# Patient Record
Sex: FEMALE | Race: WHITE | Employment: UNEMPLOYED | URBAN - NONMETROPOLITAN AREA
[De-identification: names, ages, dates, MRNs, and addresses within clinical notes are randomized per-mention and may not be internally consistent; named-entity substitution may affect disease eponyms.]

---

## 2017-02-06 ENCOUNTER — HOSPITAL ENCOUNTER (EMERGENCY)
Age: 27
Discharge: HOME | End: 2017-02-06
Attending: FAMILY MEDICINE
Payer: MEDICAID

## 2017-02-06 VITALS
HEIGHT: 67 IN | DIASTOLIC BLOOD PRESSURE: 99 MMHG | SYSTOLIC BLOOD PRESSURE: 142 MMHG | OXYGEN SATURATION: 97 % | BODY MASS INDEX: 29.82 KG/M2 | WEIGHT: 190 LBS | RESPIRATION RATE: 20 BRPM | TEMPERATURE: 98.1 F | HEART RATE: 100 BPM

## 2017-02-06 DIAGNOSIS — R44.0 AUDITORY HALLUCINATIONS: Primary | ICD-10-CM

## 2017-02-06 DIAGNOSIS — F31.9 BIPOLAR 1 DISORDER (HCC): ICD-10-CM

## 2017-02-06 LAB
ALBUMIN SERPL-MCNC: 4.8 G/DL (ref 3.5–5.2)
ALP BLD-CCNC: 73 U/L (ref 35–104)
ALT SERPL-CCNC: 36 U/L (ref 5–33)
AMPHETAMINE SCREEN, URINE: NEGATIVE
ANION GAP SERPL CALCULATED.3IONS-SCNC: 14 MMOL/L (ref 7–19)
AST SERPL-CCNC: 28 U/L (ref 5–32)
BACTERIA: ABNORMAL /HPF
BARBITURATE SCREEN URINE: NEGATIVE
BASOPHILS ABSOLUTE: 0.1 K/UL (ref 0–0.2)
BASOPHILS RELATIVE PERCENT: 0.3 % (ref 0–1)
BENZODIAZEPINE SCREEN, URINE: NEGATIVE
BILIRUB SERPL-MCNC: <0.2 MG/DL (ref 0.2–1.2)
BILIRUBIN URINE: NEGATIVE
BLOOD, URINE: NEGATIVE
BUN BLDV-MCNC: 8 MG/DL (ref 6–20)
CALCIUM SERPL-MCNC: 8.9 MG/DL (ref 8.6–10)
CANNABINOID SCREEN URINE: NEGATIVE
CHLORIDE BLD-SCNC: 99 MMOL/L (ref 98–111)
CLARITY: ABNORMAL
CO2: 26 MMOL/L (ref 22–29)
COCAINE METABOLITE SCREEN URINE: NEGATIVE
COLOR: YELLOW
CREAT SERPL-MCNC: 0.8 MG/DL (ref 0.5–0.9)
EOSINOPHILS ABSOLUTE: 0.2 K/UL (ref 0–0.6)
EOSINOPHILS RELATIVE PERCENT: 1.6 % (ref 0–5)
EPITHELIAL CELLS, UA: ABNORMAL /HPF
ETHANOL: <10 MG/DL (ref 0–0.08)
GFR NON-AFRICAN AMERICAN: >60
GLOBULIN: 2.7 G/DL
GLUCOSE BLD-MCNC: 106 MG/DL (ref 74–109)
GLUCOSE URINE: NEGATIVE MG/DL
HCG(URINE) PREGNANCY TEST: NEGATIVE
HCT VFR BLD CALC: 43.2 % (ref 37–47)
HEMOGLOBIN: 14.8 G/DL (ref 12–16)
KETONES, URINE: NEGATIVE MG/DL
LEUKOCYTE ESTERASE, URINE: ABNORMAL
LYMPHOCYTES ABSOLUTE: 5.4 K/UL (ref 1.1–4.5)
LYMPHOCYTES RELATIVE PERCENT: 36.5 % (ref 20–40)
Lab: NORMAL
MCH RBC QN AUTO: 31.6 PG (ref 27–31)
MCHC RBC AUTO-ENTMCNC: 34.3 G/DL (ref 33–37)
MCV RBC AUTO: 92.3 FL (ref 81–99)
MONOCYTES ABSOLUTE: 0.7 K/UL (ref 0–0.9)
MONOCYTES RELATIVE PERCENT: 4.6 % (ref 0–10)
NEUTROPHILS ABSOLUTE: 8.5 K/UL (ref 1.5–7.5)
NEUTROPHILS RELATIVE PERCENT: 57 % (ref 50–65)
NITRITE, URINE: NEGATIVE
OPIATE SCREEN URINE: NEGATIVE
PDW BLD-RTO: 12.9 % (ref 11.5–14.5)
PH UA: 6.5
PLATELET # BLD: 253 K/UL (ref 130–400)
PMV BLD AUTO: 10.3 FL (ref 7.4–10.4)
POTASSIUM SERPL-SCNC: 3.8 MMOL/L (ref 3.5–5)
PROTEIN UA: NEGATIVE MG/DL
RBC # BLD: 4.68 M/UL (ref 4.2–5.4)
RBC UA: ABNORMAL /HPF (ref 0–2)
SODIUM BLD-SCNC: 139 MMOL/L (ref 136–145)
SPECIFIC GRAVITY UA: 1.01
TOTAL PROTEIN: 7.5 G/DL (ref 6.6–8.7)
UROBILINOGEN, URINE: 0.2 E.U./DL
WBC # BLD: 14.9 K/UL (ref 4.8–10.8)
WBC UA: ABNORMAL /HPF (ref 0–5)

## 2017-02-06 PROCEDURE — 85025 COMPLETE CBC W/AUTO DIFF WBC: CPT

## 2017-02-06 PROCEDURE — 87086 URINE CULTURE/COLONY COUNT: CPT

## 2017-02-06 PROCEDURE — 81001 URINALYSIS AUTO W/SCOPE: CPT

## 2017-02-06 PROCEDURE — 80307 DRUG TEST PRSMV CHEM ANLYZR: CPT

## 2017-02-06 PROCEDURE — G0480 DRUG TEST DEF 1-7 CLASSES: HCPCS

## 2017-02-06 PROCEDURE — 99285 EMERGENCY DEPT VISIT HI MDM: CPT

## 2017-02-06 PROCEDURE — 81025 URINE PREGNANCY TEST: CPT

## 2017-02-06 PROCEDURE — 36415 COLL VENOUS BLD VENIPUNCTURE: CPT

## 2017-02-06 PROCEDURE — 99283 EMERGENCY DEPT VISIT LOW MDM: CPT | Performed by: FAMILY MEDICINE

## 2017-02-06 PROCEDURE — 80053 COMPREHEN METABOLIC PANEL: CPT

## 2017-02-06 RX ORDER — QUETIAPINE FUMARATE 25 MG/1
50 TABLET, FILM COATED ORAL NIGHTLY
Qty: 7 TABLET | Refills: 0 | Status: ON HOLD | OUTPATIENT
Start: 2017-02-06 | End: 2017-02-16 | Stop reason: HOSPADM

## 2017-02-06 ASSESSMENT — ENCOUNTER SYMPTOMS
CONSTIPATION: 0
WHEEZING: 0
ABDOMINAL PAIN: 0
DIARRHEA: 0
COUGH: 0
SHORTNESS OF BREATH: 0
APNEA: 0
BACK PAIN: 0
ABDOMINAL DISTENTION: 0
CHEST TIGHTNESS: 0
SORE THROAT: 0
TROUBLE SWALLOWING: 0
VOMITING: 0

## 2017-02-08 LAB — URINE CULTURE, ROUTINE: NORMAL

## 2017-02-14 ENCOUNTER — HOSPITAL ENCOUNTER (INPATIENT)
Age: 27
LOS: 3 days | Discharge: HOME | DRG: 750 | End: 2017-02-17
Attending: EMERGENCY MEDICINE | Admitting: PSYCHIATRY & NEUROLOGY
Payer: MEDICAID

## 2017-02-14 DIAGNOSIS — F20.0 PARANOID SCHIZOPHRENIA (HCC): Primary | ICD-10-CM

## 2017-02-14 LAB
ALBUMIN SERPL-MCNC: 4.5 G/DL (ref 3.5–5.2)
ALP BLD-CCNC: 71 U/L (ref 35–104)
ALT SERPL-CCNC: 86 U/L (ref 5–33)
AMPHETAMINE SCREEN, URINE: NEGATIVE
ANION GAP SERPL CALCULATED.3IONS-SCNC: 11 MMOL/L (ref 7–19)
AST SERPL-CCNC: 55 U/L (ref 5–32)
BARBITURATE SCREEN URINE: NEGATIVE
BASOPHILS ABSOLUTE: 0 K/UL (ref 0–0.2)
BASOPHILS RELATIVE PERCENT: 0.3 % (ref 0–1)
BENZODIAZEPINE SCREEN, URINE: NEGATIVE
BILIRUB SERPL-MCNC: 0.3 MG/DL (ref 0.2–1.2)
BILIRUBIN URINE: NEGATIVE
BLOOD, URINE: NEGATIVE
BUN BLDV-MCNC: 7 MG/DL (ref 6–20)
CALCIUM SERPL-MCNC: 8.8 MG/DL (ref 8.6–10)
CANNABINOID SCREEN URINE: NEGATIVE
CHLORIDE BLD-SCNC: 103 MMOL/L (ref 98–111)
CLARITY: ABNORMAL
CO2: 28 MMOL/L (ref 22–29)
COCAINE METABOLITE SCREEN URINE: NEGATIVE
COLOR: YELLOW
CREAT SERPL-MCNC: 0.7 MG/DL (ref 0.5–0.9)
EOSINOPHILS ABSOLUTE: 0.2 K/UL (ref 0–0.6)
EOSINOPHILS RELATIVE PERCENT: 2.1 % (ref 0–5)
ETHANOL: <10 MG/DL (ref 0–0.08)
GFR NON-AFRICAN AMERICAN: >60
GLOBULIN: 2.7 G/DL
GLUCOSE BLD-MCNC: 84 MG/DL (ref 74–109)
GLUCOSE URINE: NEGATIVE MG/DL
HCG(URINE) PREGNANCY TEST: NEGATIVE
HCT VFR BLD CALC: 42.2 % (ref 37–47)
HEMOGLOBIN: 14.4 G/DL (ref 12–16)
KETONES, URINE: NEGATIVE MG/DL
LEUKOCYTE ESTERASE, URINE: NEGATIVE
LYMPHOCYTES ABSOLUTE: 4 K/UL (ref 1.1–4.5)
LYMPHOCYTES RELATIVE PERCENT: 35.3 % (ref 20–40)
Lab: NORMAL
MCH RBC QN AUTO: 31.6 PG (ref 27–31)
MCHC RBC AUTO-ENTMCNC: 34.1 G/DL (ref 33–37)
MCV RBC AUTO: 92.5 FL (ref 81–99)
MONOCYTES ABSOLUTE: 0.7 K/UL (ref 0–0.9)
MONOCYTES RELATIVE PERCENT: 6.1 % (ref 0–10)
NEUTROPHILS ABSOLUTE: 6.4 K/UL (ref 1.5–7.5)
NEUTROPHILS RELATIVE PERCENT: 56.2 % (ref 50–65)
NITRITE, URINE: NEGATIVE
OPIATE SCREEN URINE: NEGATIVE
PDW BLD-RTO: 12.7 % (ref 11.5–14.5)
PH UA: 7
PLATELET # BLD: 228 K/UL (ref 130–400)
PMV BLD AUTO: 10.1 FL (ref 7.4–10.4)
POTASSIUM SERPL-SCNC: 4.1 MMOL/L (ref 3.5–5)
PROTEIN UA: NEGATIVE MG/DL
RBC # BLD: 4.56 M/UL (ref 4.2–5.4)
SODIUM BLD-SCNC: 142 MMOL/L (ref 136–145)
SPECIFIC GRAVITY UA: 1.01
TOTAL PROTEIN: 7.2 G/DL (ref 6.6–8.7)
UROBILINOGEN, URINE: 0.2 E.U./DL
WBC # BLD: 11.4 K/UL (ref 4.8–10.8)

## 2017-02-14 PROCEDURE — 81003 URINALYSIS AUTO W/O SCOPE: CPT

## 2017-02-14 PROCEDURE — 99283 EMERGENCY DEPT VISIT LOW MDM: CPT | Performed by: EMERGENCY MEDICINE

## 2017-02-14 PROCEDURE — 6370000000 HC RX 637 (ALT 250 FOR IP): Performed by: PSYCHIATRY & NEUROLOGY

## 2017-02-14 PROCEDURE — 36415 COLL VENOUS BLD VENIPUNCTURE: CPT

## 2017-02-14 PROCEDURE — 80307 DRUG TEST PRSMV CHEM ANLYZR: CPT

## 2017-02-14 PROCEDURE — 81025 URINE PREGNANCY TEST: CPT

## 2017-02-14 PROCEDURE — 99285 EMERGENCY DEPT VISIT HI MDM: CPT

## 2017-02-14 PROCEDURE — 1240000000 HC EMOTIONAL WELLNESS R&B

## 2017-02-14 PROCEDURE — G0480 DRUG TEST DEF 1-7 CLASSES: HCPCS

## 2017-02-14 PROCEDURE — 80053 COMPREHEN METABOLIC PANEL: CPT

## 2017-02-14 PROCEDURE — 85025 COMPLETE CBC W/AUTO DIFF WBC: CPT

## 2017-02-14 RX ORDER — QUETIAPINE FUMARATE 25 MG/1
25 TABLET, FILM COATED ORAL NIGHTLY
Status: DISCONTINUED | OUTPATIENT
Start: 2017-02-14 | End: 2017-02-15

## 2017-02-14 RX ORDER — NICOTINE 21 MG/24HR
1 PATCH, TRANSDERMAL 24 HOURS TRANSDERMAL DAILY
Status: DISCONTINUED | OUTPATIENT
Start: 2017-02-14 | End: 2017-02-16

## 2017-02-14 RX ORDER — QUETIAPINE FUMARATE 25 MG/1
25 TABLET, FILM COATED ORAL ONCE
Status: COMPLETED | OUTPATIENT
Start: 2017-02-14 | End: 2017-02-14

## 2017-02-14 RX ORDER — ACETAMINOPHEN 325 MG/1
650 TABLET ORAL EVERY 4 HOURS PRN
Status: DISCONTINUED | OUTPATIENT
Start: 2017-02-14 | End: 2017-02-17 | Stop reason: HOSPADM

## 2017-02-14 RX ADMIN — QUETIAPINE FUMARATE 25 MG: 25 TABLET, FILM COATED ORAL at 21:31

## 2017-02-14 RX ADMIN — QUETIAPINE FUMARATE 25 MG: 25 TABLET, FILM COATED ORAL at 21:01

## 2017-02-14 ASSESSMENT — LIFESTYLE VARIABLES: HISTORY_ALCOHOL_USE: NO

## 2017-02-15 ENCOUNTER — APPOINTMENT (OUTPATIENT)
Dept: CT IMAGING | Age: 27
DRG: 750 | End: 2017-02-15
Payer: MEDICAID

## 2017-02-15 PROBLEM — F25.9 SCHIZOAFFECTIVE DISORDER (HCC): Status: ACTIVE | Noted: 2017-02-15

## 2017-02-15 PROCEDURE — 6370000000 HC RX 637 (ALT 250 FOR IP): Performed by: PSYCHIATRY & NEUROLOGY

## 2017-02-15 PROCEDURE — 6370000000 HC RX 637 (ALT 250 FOR IP): Performed by: NURSE PRACTITIONER

## 2017-02-15 PROCEDURE — 90792 PSYCH DIAG EVAL W/MED SRVCS: CPT | Performed by: NURSE PRACTITIONER

## 2017-02-15 PROCEDURE — 1240000000 HC EMOTIONAL WELLNESS R&B

## 2017-02-15 PROCEDURE — 70450 CT HEAD/BRAIN W/O DYE: CPT

## 2017-02-15 RX ORDER — DICYCLOMINE HYDROCHLORIDE 10 MG/1
10 CAPSULE ORAL
Status: DISCONTINUED | OUTPATIENT
Start: 2017-02-15 | End: 2017-02-15

## 2017-02-15 RX ORDER — METHADONE HYDROCHLORIDE 10 MG/ML
100 CONCENTRATE ORAL DAILY
Status: DISCONTINUED | OUTPATIENT
Start: 2017-02-17 | End: 2017-02-17 | Stop reason: HOSPADM

## 2017-02-15 RX ORDER — CLONIDINE HYDROCHLORIDE 0.1 MG/1
0.1 TABLET ORAL 2 TIMES DAILY
Status: DISCONTINUED | OUTPATIENT
Start: 2017-02-15 | End: 2017-02-15

## 2017-02-15 RX ORDER — LOPERAMIDE HYDROCHLORIDE 2 MG/1
2 CAPSULE ORAL 4 TIMES DAILY PRN
Status: DISCONTINUED | OUTPATIENT
Start: 2017-02-15 | End: 2017-02-17 | Stop reason: HOSPADM

## 2017-02-15 RX ORDER — ONDANSETRON 4 MG/1
4 TABLET, FILM COATED ORAL ONCE
Status: DISCONTINUED | OUTPATIENT
Start: 2017-02-15 | End: 2017-02-15

## 2017-02-15 RX ORDER — RISPERIDONE 1 MG/1
1 TABLET, FILM COATED ORAL 2 TIMES DAILY
Status: DISCONTINUED | OUTPATIENT
Start: 2017-02-15 | End: 2017-02-17 | Stop reason: HOSPADM

## 2017-02-15 RX ORDER — METHADONE HYDROCHLORIDE 10 MG/ML
100 CONCENTRATE ORAL DAILY
Status: COMPLETED | OUTPATIENT
Start: 2017-02-15 | End: 2017-02-16

## 2017-02-15 RX ORDER — RISPERIDONE 1 MG/1
2 TABLET, FILM COATED ORAL ONCE
Status: COMPLETED | OUTPATIENT
Start: 2017-02-15 | End: 2017-02-15

## 2017-02-15 RX ADMIN — ACETAMINOPHEN 650 MG: 325 TABLET, FILM COATED ORAL at 21:38

## 2017-02-15 RX ADMIN — METHADONE HYDROCHLORIDE 100 MG: 10 CONCENTRATE ORAL at 11:50

## 2017-02-15 RX ADMIN — RISPERIDONE 1 MG: 1 TABLET, FILM COATED ORAL at 21:21

## 2017-02-15 RX ADMIN — ACETAMINOPHEN 650 MG: 325 TABLET, FILM COATED ORAL at 16:46

## 2017-02-15 RX ADMIN — RISPERIDONE 2 MG: 1 TABLET, FILM COATED ORAL at 11:54

## 2017-02-15 ASSESSMENT — PAIN DESCRIPTION - ORIENTATION: ORIENTATION: UPPER;ANTERIOR;MID

## 2017-02-15 ASSESSMENT — PAIN DESCRIPTION - FREQUENCY: FREQUENCY: INTERMITTENT

## 2017-02-15 ASSESSMENT — SLEEP AND FATIGUE QUESTIONNAIRES
AVERAGE NUMBER OF SLEEP HOURS: 4
DIFFICULTY FALLING ASLEEP: NO
SLEEP PATTERN: DISTURBED/INTERRUPTED SLEEP
DIFFICULTY ARISING: NO
RESTFUL SLEEP: NO
DO YOU HAVE DIFFICULTY SLEEPING: YES
DIFFICULTY STAYING ASLEEP: YES
DO YOU USE A SLEEP AID: YES

## 2017-02-15 ASSESSMENT — PAIN SCALES - GENERAL
PAINLEVEL_OUTOF10: 7
PAINLEVEL_OUTOF10: 0
PAINLEVEL_OUTOF10: 1
PAINLEVEL_OUTOF10: 5

## 2017-02-15 ASSESSMENT — PAIN DESCRIPTION - LOCATION: LOCATION: HEAD

## 2017-02-15 ASSESSMENT — PAIN DESCRIPTION - PAIN TYPE: TYPE: ACUTE PAIN

## 2017-02-15 ASSESSMENT — PAIN DESCRIPTION - ONSET: ONSET: GRADUAL

## 2017-02-15 ASSESSMENT — PAIN DESCRIPTION - DESCRIPTORS: DESCRIPTORS: ACHING;PRESSURE

## 2017-02-15 ASSESSMENT — PAIN DESCRIPTION - PROGRESSION: CLINICAL_PROGRESSION: GRADUALLY IMPROVING

## 2017-02-16 LAB
ALBUMIN SERPL-MCNC: 4.1 G/DL (ref 3.5–5.2)
ALP BLD-CCNC: 64 U/L (ref 35–104)
ALT SERPL-CCNC: 76 U/L (ref 5–33)
ANION GAP SERPL CALCULATED.3IONS-SCNC: 13 MMOL/L (ref 7–19)
AST SERPL-CCNC: 43 U/L (ref 5–32)
BILIRUB SERPL-MCNC: 0.4 MG/DL (ref 0.2–1.2)
BUN BLDV-MCNC: 8 MG/DL (ref 6–20)
CALCIUM SERPL-MCNC: 9 MG/DL (ref 8.6–10)
CHLORIDE BLD-SCNC: 103 MMOL/L (ref 98–111)
CO2: 25 MMOL/L (ref 22–29)
CREAT SERPL-MCNC: 0.6 MG/DL (ref 0.5–0.9)
GFR NON-AFRICAN AMERICAN: >60
GLOBULIN: 2.6 G/DL
GLUCOSE BLD-MCNC: 82 MG/DL (ref 74–109)
POTASSIUM SERPL-SCNC: 4.3 MMOL/L (ref 3.5–5)
SODIUM BLD-SCNC: 141 MMOL/L (ref 136–145)
TOTAL PROTEIN: 6.7 G/DL (ref 6.6–8.7)
TSH SERPL DL<=0.05 MIU/L-ACNC: 4.68 UIU/ML (ref 0.27–4.2)
VITAMIN B-12: 726 PG/ML (ref 211–946)
VITAMIN D 25-HYDROXY: 8.5 NG/ML

## 2017-02-16 PROCEDURE — 86803 HEPATITIS C AB TEST: CPT

## 2017-02-16 PROCEDURE — 80053 COMPREHEN METABOLIC PANEL: CPT

## 2017-02-16 PROCEDURE — 82306 VITAMIN D 25 HYDROXY: CPT

## 2017-02-16 PROCEDURE — 99231 SBSQ HOSP IP/OBS SF/LOW 25: CPT | Performed by: NURSE PRACTITIONER

## 2017-02-16 PROCEDURE — 84443 ASSAY THYROID STIM HORMONE: CPT

## 2017-02-16 PROCEDURE — 6370000000 HC RX 637 (ALT 250 FOR IP): Performed by: NURSE PRACTITIONER

## 2017-02-16 PROCEDURE — 6370000000 HC RX 637 (ALT 250 FOR IP): Performed by: PSYCHIATRY & NEUROLOGY

## 2017-02-16 PROCEDURE — 1240000000 HC EMOTIONAL WELLNESS R&B

## 2017-02-16 PROCEDURE — 36415 COLL VENOUS BLD VENIPUNCTURE: CPT

## 2017-02-16 PROCEDURE — 82607 VITAMIN B-12: CPT

## 2017-02-16 RX ORDER — ERGOCALCIFEROL (VITAMIN D2) 1250 MCG
50000 CAPSULE ORAL WEEKLY
Qty: 5 CAPSULE | Refills: 0 | Status: CANCELLED | OUTPATIENT
Start: 2017-02-16

## 2017-02-16 RX ORDER — ERGOCALCIFEROL 1.25 MG/1
50000 CAPSULE ORAL WEEKLY
Status: DISCONTINUED | OUTPATIENT
Start: 2017-02-16 | End: 2017-02-17 | Stop reason: HOSPADM

## 2017-02-16 RX ORDER — RISPERIDONE 1 MG/1
1 TABLET, FILM COATED ORAL 2 TIMES DAILY
Qty: 60 TABLET | Refills: 1 | Status: SHIPPED | OUTPATIENT
Start: 2017-02-16 | End: 2017-09-20

## 2017-02-16 RX ORDER — NICOTINE 21 MG/24HR
1 PATCH, TRANSDERMAL 24 HOURS TRANSDERMAL DAILY
Status: DISCONTINUED | OUTPATIENT
Start: 2017-02-16 | End: 2017-02-17 | Stop reason: HOSPADM

## 2017-02-16 RX ORDER — ERGOCALCIFEROL (VITAMIN D2) 1250 MCG
50000 CAPSULE ORAL WEEKLY
Qty: 5 CAPSULE | Refills: 0 | Status: SHIPPED | OUTPATIENT
Start: 2017-02-16 | End: 2017-09-20

## 2017-02-16 RX ORDER — NICOTINE 21 MG/24HR
1 PATCH, TRANSDERMAL 24 HOURS TRANSDERMAL DAILY
Qty: 30 PATCH | Refills: 0 | Status: SHIPPED | OUTPATIENT
Start: 2017-02-16 | End: 2017-09-13

## 2017-02-16 RX ADMIN — RISPERIDONE 1 MG: 1 TABLET, FILM COATED ORAL at 09:16

## 2017-02-16 RX ADMIN — METHADONE HYDROCHLORIDE 100 MG: 10 CONCENTRATE ORAL at 09:18

## 2017-02-16 RX ADMIN — RISPERIDONE 1 MG: 1 TABLET, FILM COATED ORAL at 21:12

## 2017-02-16 RX ADMIN — ERGOCALCIFEROL 50000 UNITS: 1.25 CAPSULE ORAL at 16:20

## 2017-02-16 RX ADMIN — ACETAMINOPHEN 650 MG: 325 TABLET, FILM COATED ORAL at 06:10

## 2017-02-16 ASSESSMENT — PAIN SCALES - GENERAL
PAINLEVEL_OUTOF10: 5
PAINLEVEL_OUTOF10: 0

## 2017-02-17 VITALS
DIASTOLIC BLOOD PRESSURE: 87 MMHG | TEMPERATURE: 97.7 F | HEIGHT: 67 IN | WEIGHT: 202.6 LBS | SYSTOLIC BLOOD PRESSURE: 123 MMHG | RESPIRATION RATE: 18 BRPM | HEART RATE: 105 BPM | OXYGEN SATURATION: 96 % | BODY MASS INDEX: 31.8 KG/M2

## 2017-02-17 LAB
HEPATITIS C ANTIBODY INTERPRETATION: NORMAL
T4 FREE: 1.2 NG/ML (ref 0.9–1.7)

## 2017-02-17 PROCEDURE — 99238 HOSP IP/OBS DSCHRG MGMT 30/<: CPT | Performed by: NURSE PRACTITIONER

## 2017-02-17 PROCEDURE — 36415 COLL VENOUS BLD VENIPUNCTURE: CPT

## 2017-02-17 PROCEDURE — 84439 ASSAY OF FREE THYROXINE: CPT

## 2017-02-21 RX ORDER — QUETIAPINE FUMARATE 100 MG/1
100 TABLET, FILM COATED ORAL NIGHTLY
Qty: 30 TABLET | Refills: 0 | Status: SHIPPED | OUTPATIENT
Start: 2017-02-21 | End: 2017-03-24 | Stop reason: SDUPTHER

## 2017-03-13 ENCOUNTER — OFFICE VISIT (OUTPATIENT)
Dept: FAMILY MEDICINE CLINIC | Facility: CLINIC | Age: 27
End: 2017-03-13

## 2017-03-13 VITALS
WEIGHT: 234 LBS | OXYGEN SATURATION: 96 % | DIASTOLIC BLOOD PRESSURE: 80 MMHG | HEART RATE: 102 BPM | SYSTOLIC BLOOD PRESSURE: 110 MMHG | RESPIRATION RATE: 16 BRPM

## 2017-03-13 DIAGNOSIS — R10.11 RIGHT UPPER QUADRANT ABDOMINAL PAIN: Primary | ICD-10-CM

## 2017-03-13 PROCEDURE — 99213 OFFICE O/P EST LOW 20 MIN: CPT | Performed by: FAMILY MEDICINE

## 2017-03-13 RX ORDER — ERGOCALCIFEROL 1.25 MG/1
CAPSULE ORAL
COMMUNITY
Start: 2017-02-16 | End: 2017-09-13 | Stop reason: HOSPADM

## 2017-03-13 RX ORDER — RISPERIDONE 1 MG/1
TABLET ORAL
COMMUNITY
Start: 2017-02-16 | End: 2017-09-13 | Stop reason: HOSPADM

## 2017-03-13 RX ORDER — ONDANSETRON 4 MG/1
4 TABLET, FILM COATED ORAL EVERY 8 HOURS PRN
Qty: 6 TABLET | Refills: 0 | Status: SHIPPED | OUTPATIENT
Start: 2017-03-13 | End: 2018-05-01

## 2017-03-13 RX ORDER — NICOTINE 21 MG/24HR
PATCH, TRANSDERMAL 24 HOURS TRANSDERMAL
COMMUNITY
Start: 2017-02-16 | End: 2017-09-13 | Stop reason: HOSPADM

## 2017-03-13 NOTE — PROGRESS NOTES
Subjective   Lakeisha Dickey is a 26 y.o. female.     Chief Complaint   Patient presents with   • Abdominal Pain     URQ         History of Present Illness     for several mos noted ruq pain associated with meals--denies fever or vomiing but has had some nausea      Current Outpatient Prescriptions:   •  ergocalciferol (ERGOCALCIFEROL) 60846 UNITS capsule, Take 1 capsule by mouth once a week, Disp: , Rfl:   •  nicotine (NICODERM CQ) 14 MG/24HR patch, Place 1 patch onto the skin daily, Disp: , Rfl:   •  risperiDONE (risperDAL) 1 MG tablet, Take 1 tablet by mouth 2 times daily, Disp: , Rfl:   •  METHADONE HCL PO, Take 50 mg by mouth, Disp: , Rfl:   •  ondansetron (ZOFRAN) 4 MG tablet, Take 1 tablet by mouth Every 8 (Eight) Hours As Needed for Nausea or Vomiting., Disp: 6 tablet, Rfl: 0  •  QUEtiapine (SEROQUEL) 100 MG tablet, Take 1 tablet by mouth Every Night., Disp: 30 tablet, Rfl: 0  Allergies not on file    No past medical history on file.  No past surgical history on file.    Review of Systems   Constitutional: Negative.    HENT: Negative.    Eyes: Negative.    Respiratory: Negative.    Cardiovascular: Negative.    Gastrointestinal: Positive for abdominal pain and nausea.   Endocrine: Negative.    Genitourinary: Negative.    Musculoskeletal: Negative.    Skin: Negative.    Allergic/Immunologic: Negative.    Neurological: Negative.    Hematological: Negative.    Psychiatric/Behavioral: Negative.        Objective    Visit Vitals   • /80   • Pulse 102   • Resp 16   • Wt 234 lb (106 kg)   • SpO2 96%     Physical Exam   Constitutional: She is oriented to person, place, and time. She appears well-developed and well-nourished.   HENT:   Head: Normocephalic and atraumatic.   Right Ear: External ear normal.   Left Ear: External ear normal.   Nose: Nose normal.   Mouth/Throat: Oropharynx is clear and moist.   Eyes: Conjunctivae and EOM are normal. Pupils are equal, round, and reactive to light.   Neck: Normal range of  motion. Neck supple.   Cardiovascular: Normal rate, regular rhythm, normal heart sounds and intact distal pulses.    Pulmonary/Chest: Effort normal and breath sounds normal.   Abdominal: Soft. Bowel sounds are normal. She exhibits no mass. There is tenderness. There is no rebound and no guarding. No hernia.   Musculoskeletal: Normal range of motion.   Neurological: She is alert and oriented to person, place, and time. She has normal reflexes.   Skin: Skin is warm and dry.   Psychiatric: She has a normal mood and affect. Her behavior is normal. Judgment and thought content normal.   Nursing note and vitals reviewed.      Assessment/Plan   Lakeisha was seen today for abdominal pain.    Diagnoses and all orders for this visit:    Right upper quadrant abdominal pain  -     Pregnancy, Urine  -     US Gallbladder  -     NM HIDA scan with pharmacological intervention    Other orders  -     ondansetron (ZOFRAN) 4 MG tablet; Take 1 tablet by mouth Every 8 (Eight) Hours As Needed for Nausea or Vomiting.      Keep me informed           Orders Placed This Encounter   Procedures   • US Gallbladder   • NM HIDA scan with pharmacological intervention   • Pregnancy, Urine       Follow up prn

## 2017-03-24 ENCOUNTER — TELEPHONE (OUTPATIENT)
Dept: FAMILY MEDICINE CLINIC | Facility: CLINIC | Age: 27
End: 2017-03-24

## 2017-03-24 RX ORDER — QUETIAPINE FUMARATE 100 MG/1
100 TABLET, FILM COATED ORAL NIGHTLY
Qty: 30 TABLET | Refills: 0 | Status: SHIPPED | OUTPATIENT
Start: 2017-03-24 | End: 2018-06-21

## 2017-03-24 NOTE — TELEPHONE ENCOUNTER
Says she is needing her Seroquel 100mg 1 q hs refilled. Has an appt with Dr. Stone 4/13/2017. Needs enough until then. [facility]

## 2017-04-05 RX ORDER — EPINEPHRINE 0.3 MG/.3ML
0.3 INJECTION SUBCUTANEOUS ONCE
Qty: 1 EACH | Refills: 1 | Status: SHIPPED | OUTPATIENT
Start: 2017-04-05 | End: 2017-04-05

## 2017-04-19 ENCOUNTER — CLINICAL SUPPORT (OUTPATIENT)
Dept: FAMILY MEDICINE CLINIC | Facility: CLINIC | Age: 27
End: 2017-04-19

## 2017-04-19 DIAGNOSIS — Z23 NEED FOR VACCINATION: Primary | ICD-10-CM

## 2017-04-19 PROCEDURE — 90471 IMMUNIZATION ADMIN: CPT | Performed by: FAMILY MEDICINE

## 2017-04-19 PROCEDURE — 90715 TDAP VACCINE 7 YRS/> IM: CPT | Performed by: FAMILY MEDICINE

## 2017-06-17 ENCOUNTER — HOSPITAL ENCOUNTER (EMERGENCY)
Facility: HOSPITAL | Age: 27
Discharge: LEFT BEFORE DISCHARGE | End: 2017-06-17

## 2017-06-17 VITALS
BODY MASS INDEX: 36.54 KG/M2 | DIASTOLIC BLOOD PRESSURE: 61 MMHG | WEIGHT: 214 LBS | SYSTOLIC BLOOD PRESSURE: 129 MMHG | HEART RATE: 82 BPM | TEMPERATURE: 99.3 F | OXYGEN SATURATION: 100 % | HEIGHT: 64 IN | RESPIRATION RATE: 14 BRPM

## 2017-06-17 PROCEDURE — 99211 OFF/OP EST MAY X REQ PHY/QHP: CPT

## 2017-08-28 DIAGNOSIS — N91.2 AMENORRHEA: Primary | ICD-10-CM

## 2017-08-29 LAB — B-HCG SERPL QL: POSITIVE MIU/ML

## 2017-08-31 ENCOUNTER — TELEPHONE (OUTPATIENT)
Dept: OBSTETRICS AND GYNECOLOGY | Facility: CLINIC | Age: 27
End: 2017-08-31

## 2017-09-12 ENCOUNTER — HOSPITAL ENCOUNTER (OUTPATIENT)
Facility: HOSPITAL | Age: 27
Setting detail: OBSERVATION
Discharge: HOME | End: 2017-09-13
Attending: OBSTETRICS & GYNECOLOGY | Admitting: OBSTETRICS & GYNECOLOGY

## 2017-09-12 LAB
BACTERIA UR QL AUTO: ABNORMAL /HPF
BILIRUB UR QL STRIP: NEGATIVE
CLARITY UR: CLEAR
COLOR UR: ABNORMAL
GLUCOSE UR STRIP-MCNC: NEGATIVE MG/DL
HGB UR QL STRIP.AUTO: ABNORMAL
HYALINE CASTS UR QL AUTO: ABNORMAL /LPF
KETONES UR QL STRIP: NEGATIVE
LEUKOCYTE ESTERASE UR QL STRIP.AUTO: ABNORMAL
NITRITE UR QL STRIP: NEGATIVE
PH UR STRIP.AUTO: 6.5 [PH] (ref 5–8)
PROT UR QL STRIP: ABNORMAL
RBC # UR: ABNORMAL /HPF
REF LAB TEST METHOD: ABNORMAL
SP GR UR STRIP: 1.02 (ref 1–1.03)
SQUAMOUS #/AREA URNS HPF: ABNORMAL /HPF
UROBILINOGEN UR QL STRIP: ABNORMAL
WBC CLUMPS # UR AUTO: ABNORMAL /HPF
WBC UR QL AUTO: ABNORMAL /HPF

## 2017-09-12 PROCEDURE — 87086 URINE CULTURE/COLONY COUNT: CPT | Performed by: OBSTETRICS & GYNECOLOGY

## 2017-09-12 PROCEDURE — G0379 DIRECT REFER HOSPITAL OBSERV: HCPCS

## 2017-09-12 PROCEDURE — G0378 HOSPITAL OBSERVATION PER HR: HCPCS

## 2017-09-12 PROCEDURE — 96361 HYDRATE IV INFUSION ADD-ON: CPT

## 2017-09-12 PROCEDURE — 81001 URINALYSIS AUTO W/SCOPE: CPT | Performed by: OBSTETRICS & GYNECOLOGY

## 2017-09-12 RX ORDER — METHADONE HYDROCHLORIDE 10 MG/ML
100 CONCENTRATE ORAL DAILY
Status: DISCONTINUED | OUTPATIENT
Start: 2017-09-13 | End: 2017-09-13 | Stop reason: HOSPADM

## 2017-09-12 RX ORDER — ACETAMINOPHEN 500 MG
500 TABLET ORAL EVERY 4 HOURS PRN
COMMUNITY
End: 2018-04-07 | Stop reason: HOSPADM

## 2017-09-12 RX ORDER — ONDANSETRON HCL IN 0.9 % NACL 8 MG/50 ML
8 INTRAVENOUS SOLUTION, PIGGYBACK (ML) INTRAVENOUS EVERY 6 HOURS PRN
Status: DISCONTINUED | OUTPATIENT
Start: 2017-09-12 | End: 2017-09-13 | Stop reason: HOSPADM

## 2017-09-12 RX ORDER — QUETIAPINE FUMARATE 25 MG/1
25 TABLET, FILM COATED ORAL NIGHTLY
Status: DISCONTINUED | OUTPATIENT
Start: 2017-09-12 | End: 2017-09-13 | Stop reason: HOSPADM

## 2017-09-12 RX ORDER — PRENATAL VIT/IRON FUM/FOLIC AC 27MG-0.8MG
1 TABLET ORAL DAILY
Status: DISCONTINUED | OUTPATIENT
Start: 2017-09-13 | End: 2017-09-13 | Stop reason: HOSPADM

## 2017-09-12 RX ORDER — ACETAMINOPHEN 500 MG
1000 TABLET ORAL EVERY 6 HOURS PRN
Status: DISCONTINUED | OUTPATIENT
Start: 2017-09-12 | End: 2017-09-13 | Stop reason: HOSPADM

## 2017-09-12 RX ORDER — SODIUM CHLORIDE 9 MG/ML
150 INJECTION, SOLUTION INTRAVENOUS CONTINUOUS
Status: DISCONTINUED | OUTPATIENT
Start: 2017-09-12 | End: 2017-09-13 | Stop reason: HOSPADM

## 2017-09-12 RX ADMIN — QUETIAPINE FUMARATE 25 MG: 25 TABLET, FILM COATED ORAL at 21:29

## 2017-09-12 RX ADMIN — ACETAMINOPHEN 1000 MG: 500 TABLET ORAL at 21:29

## 2017-09-12 RX ADMIN — SODIUM CHLORIDE 150 ML/HR: 9 INJECTION, SOLUTION INTRAVENOUS at 19:02

## 2017-09-12 NOTE — PLAN OF CARE
Problem: Patient Care Overview (Adult)  Goal: Plan of Care Review  Outcome: Ongoing (interventions implemented as appropriate)    17   Coping/Psychosocial Response Interventions   Plan Of Care Reviewed With patient   Patient Care Overview   Progress no change   Outcome Evaluation   Outcome Summary/Follow up Plan pt arrived via ambulance from [facility] with C/o ABD cramping, back pain and fever over last 2 days. Reports to be 6 week pregnant. IV in right AC, placed at [facility], voiding and ambulating off the unit, call placed to Dr. Solis r/t positive sepsis screen- still awaiting call back        Goal: Adult Individualization and Mutuality  Outcome: Ongoing (interventions implemented as appropriate)    17   Individualization   Patient Specific Preferences  - lost first baby about 5 years ago in 3rd to 4th week of prenancy - fearful of having a miscarriage        Goal: Discharge Needs Assessment    17   Discharge Needs Assessment   Concerns To Be Addressed no discharge needs identified         Problem: Infection, Risk/Actual (Adult)  Goal: Identify Related Risk Factors and Signs and Symptoms  Outcome: Ongoing (interventions implemented as appropriate)    17   Infection, Risk/Actual   Signs and Symptoms (Infection, Risk/Actual) body temperature changes;heart rate increase;pain;vomiting;lab value changes   Labs obtained from [facility] indicates elevated WBC, pt reports fever over last 48 hours

## 2017-09-13 VITALS
TEMPERATURE: 97.8 F | RESPIRATION RATE: 18 BRPM | HEIGHT: 68 IN | BODY MASS INDEX: 28.19 KG/M2 | HEART RATE: 55 BPM | SYSTOLIC BLOOD PRESSURE: 91 MMHG | DIASTOLIC BLOOD PRESSURE: 61 MMHG | OXYGEN SATURATION: 96 % | WEIGHT: 186 LBS

## 2017-09-13 PROBLEM — Z34.90 PREGNANCY: Status: ACTIVE | Noted: 2017-09-13

## 2017-09-13 LAB
ALBUMIN SERPL-MCNC: 2.9 G/DL (ref 3.5–5)
ALBUMIN/GLOB SERPL: 1.1 G/DL (ref 1.1–2.5)
ALP SERPL-CCNC: 99 U/L (ref 24–120)
ALT SERPL W P-5'-P-CCNC: 74 U/L (ref 0–54)
ANION GAP SERPL CALCULATED.3IONS-SCNC: 8 MMOL/L (ref 4–13)
AST SERPL-CCNC: 69 U/L (ref 7–45)
BASOPHILS # BLD AUTO: 0.01 10*3/MM3 (ref 0–0.2)
BASOPHILS NFR BLD AUTO: 0.1 % (ref 0–2)
BILIRUB SERPL-MCNC: 0.7 MG/DL (ref 0.1–1)
BUN BLD-MCNC: 6 MG/DL (ref 5–21)
BUN/CREAT SERPL: 9.4 (ref 7–25)
CALCIUM SPEC-SCNC: 8.1 MG/DL (ref 8.4–10.4)
CHLORIDE SERPL-SCNC: 107 MMOL/L (ref 98–110)
CO2 SERPL-SCNC: 21 MMOL/L (ref 24–31)
CREAT BLD-MCNC: 0.64 MG/DL (ref 0.5–1.4)
DEPRECATED RDW RBC AUTO: 44.9 FL (ref 40–54)
EOSINOPHIL # BLD AUTO: 0.03 10*3/MM3 (ref 0–0.7)
EOSINOPHIL NFR BLD AUTO: 0.2 % (ref 0–4)
ERYTHROCYTE [DISTWIDTH] IN BLOOD BY AUTOMATED COUNT: 13.6 % (ref 12–15)
GFR SERPL CREATININE-BSD FRML MDRD: 110 ML/MIN/1.73
GLOBULIN UR ELPH-MCNC: 2.7 GM/DL
GLUCOSE BLD-MCNC: 96 MG/DL (ref 70–100)
HCT VFR BLD AUTO: 31.2 % (ref 37–47)
HGB BLD-MCNC: 10.2 G/DL (ref 12–16)
IMM GRANULOCYTES # BLD: 0.08 10*3/MM3 (ref 0–0.03)
IMM GRANULOCYTES NFR BLD: 0.4 % (ref 0–5)
LYMPHOCYTES # BLD AUTO: 1.49 10*3/MM3 (ref 0.72–4.86)
LYMPHOCYTES NFR BLD AUTO: 8.2 % (ref 15–45)
MCH RBC QN AUTO: 29.5 PG (ref 28–32)
MCHC RBC AUTO-ENTMCNC: 32.7 G/DL (ref 33–36)
MCV RBC AUTO: 90.2 FL (ref 82–98)
MONOCYTES # BLD AUTO: 1.45 10*3/MM3 (ref 0.19–1.3)
MONOCYTES NFR BLD AUTO: 8 % (ref 4–12)
NEUTROPHILS # BLD AUTO: 15.07 10*3/MM3 (ref 1.87–8.4)
NEUTROPHILS NFR BLD AUTO: 83.1 % (ref 39–78)
PLATELET # BLD AUTO: 181 10*3/MM3 (ref 130–400)
PMV BLD AUTO: 10.5 FL (ref 6–12)
POTASSIUM BLD-SCNC: 3.6 MMOL/L (ref 3.5–5.3)
PROT SERPL-MCNC: 5.6 G/DL (ref 6.3–8.7)
RBC # BLD AUTO: 3.46 10*6/MM3 (ref 4.2–5.4)
SODIUM BLD-SCNC: 136 MMOL/L (ref 135–145)
WBC NRBC COR # BLD: 18.13 10*3/MM3 (ref 4.8–10.8)

## 2017-09-13 PROCEDURE — 96361 HYDRATE IV INFUSION ADD-ON: CPT

## 2017-09-13 PROCEDURE — G0378 HOSPITAL OBSERVATION PER HR: HCPCS

## 2017-09-13 PROCEDURE — 96365 THER/PROPH/DIAG IV INF INIT: CPT

## 2017-09-13 PROCEDURE — 25010000002 CEFTRIAXONE: Performed by: NURSE PRACTITIONER

## 2017-09-13 PROCEDURE — 80053 COMPREHEN METABOLIC PANEL: CPT | Performed by: OBSTETRICS & GYNECOLOGY

## 2017-09-13 PROCEDURE — 85025 COMPLETE CBC W/AUTO DIFF WBC: CPT | Performed by: OBSTETRICS & GYNECOLOGY

## 2017-09-13 RX ORDER — NICOTINE 21 MG/24HR
1 PATCH, TRANSDERMAL 24 HOURS TRANSDERMAL EVERY 24 HOURS
Qty: 30 PATCH | Refills: 3 | Status: SHIPPED | OUTPATIENT
Start: 2017-09-13 | End: 2017-09-20

## 2017-09-13 RX ORDER — PRENATAL VIT/IRON FUM/FOLIC AC 27MG-0.8MG
1 TABLET ORAL DAILY
Qty: 30 TABLET | Refills: 11 | Status: SHIPPED | OUTPATIENT
Start: 2017-09-13 | End: 2018-06-21

## 2017-09-13 RX ORDER — CEPHALEXIN 500 MG/1
500 CAPSULE ORAL 4 TIMES DAILY
Qty: 40 CAPSULE | Refills: 0 | Status: SHIPPED | OUTPATIENT
Start: 2017-09-13 | End: 2017-09-23

## 2017-09-13 RX ADMIN — PRENATAL VIT W/ FE FUMARATE-FA TAB 27-0.8 MG 1 TABLET: 27-0.8 TAB at 08:59

## 2017-09-13 RX ADMIN — SODIUM CHLORIDE 150 ML/HR: 9 INJECTION, SOLUTION INTRAVENOUS at 10:34

## 2017-09-13 RX ADMIN — ACETAMINOPHEN 1000 MG: 500 TABLET ORAL at 03:44

## 2017-09-13 RX ADMIN — SODIUM CHLORIDE 150 ML/HR: 9 INJECTION, SOLUTION INTRAVENOUS at 03:20

## 2017-09-13 RX ADMIN — METHADONE HYDROCHLORIDE 100 MG: 10 CONCENTRATE ORAL at 08:59

## 2017-09-13 RX ADMIN — CEFTRIAXONE 1 G: 1 INJECTION, POWDER, FOR SOLUTION INTRAMUSCULAR; INTRAVENOUS at 11:37

## 2017-09-13 NOTE — PLAN OF CARE
Problem: Patient Care Overview (Adult)  Goal: Plan of Care Review  Outcome: Ongoing (interventions implemented as appropriate)    09/13/17 0258   Coping/Psychosocial Response Interventions   Plan Of Care Reviewed With patient   Patient Care Overview   Progress progress toward functional goals as expected   Outcome Evaluation   Outcome Summary/Follow up Plan Dr. Solis notified of sepsis screen. Will do labs and antibiotics in the morning. Voiding and ambulating. Tylenol given once per pt request for headache.       Goal: Adult Individualization and Mutuality  Outcome: Ongoing (interventions implemented as appropriate)  Goal: Discharge Needs Assessment  Outcome: Ongoing (interventions implemented as appropriate)    Problem: Infection, Risk/Actual (Adult)  Goal: Identify Related Risk Factors and Signs and Symptoms  Outcome: Ongoing (interventions implemented as appropriate)  Goal: Infection Prevention/Resolution  Outcome: Ongoing (interventions implemented as appropriate)

## 2017-09-13 NOTE — DISCHARGE SUMMARY
Pt has done well today. Has received 2nd dose of Rocephin IV. Has remained pain free. To follow up in our office as desires to see Dr Carla Solis for her OB care. Will f/up next week for labs and visit. To call sooner for problems. Script for Keflex sent in.

## 2017-09-13 NOTE — H&P
"[facility]  Obstetric History and Physical    Chief Complaint   Patient presents with   • fever for 2 days, cramping in ABD and pain in lower back and       Subjective   \"I have no pain today and feel much better\"    OBJECTIVE:  Patient is a 27 y.o. female  currently at 6 weeks 1 day pregnant  According to ultrasound. Was sent from [facility] yesterday after receiving a dose of Rocephin. Had elevated WBC. Was admitted observation for likely pyelonephritis. Was having low abdominal cramping, back pain and fever for 2 days.     She hasn't been seen yet during this pregnancy and was going to see Dr Marleni Barba today. No growth in urine culture x 24 hrs so far. WBC elevated this am at 18,000, but down from 26,000. LFTs slightly elevated but pt states have been since hx of IV drug use. Has been sober x 4 years and maintains on Methadone. She uses Seroquel prn at bedtime, only 25mg and is on a PNV. States had miscarriage 5 yrs ago early in pregnancy around 4-5 weeks.         Past OB History:     Obstetric History       T0      TAB0   SAB0   E0   M0   L0       # Outcome Date GA Lbr Danny/2nd Weight Sex Delivery Anes PTL Lv   1 Current                   Past Medical History: Past Medical History:   Diagnosis Date   • Dx about 4-5 years ago    • Stadol use disorder, mild, in sustained remission, abuse     4 years and 3 years clean of meth use and 3 years and 2 months clean from opiods      Past Surgical History Past Surgical History:   Procedure Laterality Date   • OVARIAN CYST REMOVAL Right    • WRIST SURGERY Left     to remove cyst       Family History: Family History   Problem Relation Age of Onset   • Hypertension Father    • Hypertension Mother    • Hypertension Paternal Grandfather    • Heart attack Paternal Grandfather    • Hypertension Paternal Grandmother    • Breast cancer Maternal Grandmother    • Hypertension Maternal Grandmother    • Heart attack Maternal Grandmother    • Hypertension " Maternal Grandfather       Social History:  reports that she has been smoking Cigarettes.  She started smoking about 13 years ago. She has been smoking about 1.00 pack per day for the past 0.00 years. She does not have any smokeless tobacco history on file.   reports that she does not drink alcohol.   reports that she uses illicit drugs, including Methamphetamines, Hydrocodone, and Oxycodone.        General ROS: Pertinent items are noted in HPI, all other systems reviewed and negative    Objective       Vital Signs Range for the last 24 hours  Temperature: Temp:  [97 °F (36.1 °C)-99 °F (37.2 °C)] 97.8 °F (36.6 °C)   Temp Source: Temp src: Temporal Artery    BP: BP: ()/(52-75) 91/61   Pulse: Heart Rate:  [] 55   Respirations: Resp:  [17-18] 18   SPO2: SpO2:  [96 %-100 %] 96 %   O2 Amount (l/min):     O2 Devices O2 Device: room air   Weight: Weight:  [186 lb (84.4 kg)] 186 lb (84.4 kg)     Physical Examination: General appearance - alert, well appearing, and in no distress and oriented to person, place, and time  Mental status - alert, oriented to person, place, and time, normal mood, behavior, speech, dress, motor activity, and thought processes  Chest - clear to auscultation, no wheezes, rales or rhonchi, symmetric air entry  Heart - normal rate, regular rhythm, normal S1, S2, no murmurs, rubs, clicks or gallops  Abdomen - soft, nontender, nondistended, no masses or organomegaly, negative CVA tenderness  Neurological - alert, oriented, normal speech, no focal findings or movement disorder noted    Laboratory Results:     Ref Range & Units 5:18 AM     WBC 4.80 - 10.80 10*3/mm3 18.13 (H)   RBC 4.20 - 5.40 10*6/mm3 3.46 (L)   Hemoglobin 12.0 - 16.0 g/dL 10.2 (L)   Hematocrit 37.0 - 47.0 % 31.2 (L)   MCV 82.0 - 98.0 fL 90.2   MCH 28.0 - 32.0 pg 29.5   MCHC 33.0 - 36.0 g/dL 32.7 (L)   RDW 12.0 - 15.0 % 13.6   RDW-SD 40.0 - 54.0 fl 44.9   MPV 6.0 - 12.0 fL 10.5   Platelets 130 - 400 10*3/mm3 181   Neutrophil %  39.0 - 78.0 % 83.1 (H)   Lymphocyte % 15.0 - 45.0 % 8.2 (L)   Monocyte % 4.0 - 12.0 % 8.0   Eosinophil % 0.0 - 4.0 % 0.2   Basophil % 0.0 - 2.0 % 0.1   Immature Grans % 0.0 - 5.0 % 0.4   Neutrophils, Absolute 1.87 - 8.40 10*3/mm3 15.07 (H)   Lymphocytes, Absolute 0.72 - 4.86 10*3/mm3 1.49   Monocytes, Absolute 0.19 - 1.30 10*3/mm3 1.45 (H)   Eosinophils, Absolute 0.00 - 0.70 10*3/mm3 0.03   Basophils, Absolute 0.00 - 0.20 10*3/mm3 0.01   Immature Grans, Absolute 0.00 - 0.03 10*3/mm3 0.08 (H)   Resulting Agency  [facility]      Specimen Collected: 09/13/17  5:18 AM Last Resulted: 09/13/17  6:06 AM           Ref Range & Units 5:18 AM     Glucose 70 - 100 mg/dL 96   BUN 5 - 21 mg/dL 6   Creatinine 0.50 - 1.40 mg/dL 0.64   Sodium 135 - 145 mmol/L 136   Potassium 3.5 - 5.3 mmol/L 3.6   Chloride 98 - 110 mmol/L 107   CO2 24.0 - 31.0 mmol/L 21.0 (L)   Calcium 8.4 - 10.4 mg/dL 8.1 (L)   Total Protein 6.3 - 8.7 g/dL 5.6 (L)   Albumin 3.50 - 5.00 g/dL 2.90 (L)   ALT (SGPT) 0 - 54 U/L 74 (H)   AST (SGOT) 7 - 45 U/L 69 (H)   Alkaline Phosphatase 24 - 120 U/L 99   Total Bilirubin 0.1 - 1.0 mg/dL 0.7   eGFR Non African Amer >60 mL/min/1.73 110   Globulin gm/dL 2.7   A/G Ratio 1.1 - 2.5 g/dL 1.1   BUN/Creatinine Ratio 7.0 - 25.0 9.4   Anion Gap 4.0 - 13.0 mmol/L 8.0   Resulting Agency  [facility]      Specimen Collected: 09/13/17  5:18 AM Last Resulted: 09/13/17  6:24 AM        Status:  Final result   Visible to patient:  No (Not Released)      Ref Range & Units 1d ago     RBC, UA None Seen /HPF 0-2 (A)   WBC, UA None Seen /HPF 31-50 (A)   Bacteria, UA None Seen /HPF 2+ (A)   Squamous Epithelial Cells, UA None Seen, 0-2 /HPF 3-6 (A)   Hyaline Casts, UA None Seen /LPF None Seen   WBC Clumps, UA None Seen /HPF Large/3+   Methodology  Manual Light Microscopy   Resulting Agency  [facility]      Specimen Collected: 09/12/17  6:00 PM Last Resulted: 09/12/17  6:30 PM                Active Problems:    Pregnancy    Assessment:  1.   Intrauterine pregnancy at 6 weeks 1 day  2.  Pyelonephritis in pregnancy  3.  6 weeks gestation    Plan:  1. Get 2nd dose of Rocephin and since pain free, discharge home. Will follow in office for NOB visit including labs. Will send home on Keflex for pyelonephritis.   2. Plan of care has been reviewed with patient   3.  Risks, benefits of treatment plan have been discussed.  4.  All questions have been answered.        Jessica Hogue, APRN  9/13/2017  1:59 PM

## 2017-09-13 NOTE — PLAN OF CARE
Problem: Patient Care Overview (Adult)  Goal: Plan of Care Review  Outcome: Outcome(s) achieved Date Met:  09/13/17 09/13/17 1520   Coping/Psychosocial Response Interventions   Plan Of Care Reviewed With patient   Patient Care Overview   Progress improving   Outcome Evaluation   Outcome Summary/Follow up Plan decrease in pain, f/u appointments made nad reviewed with pt

## 2017-09-14 LAB — BACTERIA SPEC AEROBE CULT: NORMAL

## 2017-09-20 ENCOUNTER — INITIAL PRENATAL (OUTPATIENT)
Dept: OBGYN | Age: 27
End: 2017-09-20
Payer: MEDICAID

## 2017-09-20 VITALS
WEIGHT: 184 LBS | HEART RATE: 91 BPM | SYSTOLIC BLOOD PRESSURE: 131 MMHG | BODY MASS INDEX: 28.82 KG/M2 | DIASTOLIC BLOOD PRESSURE: 91 MMHG

## 2017-09-20 DIAGNOSIS — O09.291 HISTORY OF SPONTANEOUS ABORTION, CURRENTLY PREGNANT, FIRST TRIMESTER: ICD-10-CM

## 2017-09-20 DIAGNOSIS — R11.2 NAUSEA AND VOMITING, INTRACTABILITY OF VOMITING NOT SPECIFIED, UNSPECIFIED VOMITING TYPE: ICD-10-CM

## 2017-09-20 DIAGNOSIS — Z34.90 PRENATAL CARE, UNSPECIFIED TRIMESTER: Primary | ICD-10-CM

## 2017-09-20 PROCEDURE — 99203 OFFICE O/P NEW LOW 30 MIN: CPT | Performed by: NURSE PRACTITIONER

## 2017-09-20 RX ORDER — QUETIAPINE FUMARATE 25 MG/1
25 TABLET, FILM COATED ORAL ONCE
COMMUNITY
End: 2020-11-04

## 2017-09-20 RX ORDER — ONDANSETRON 4 MG/1
8 TABLET, FILM COATED ORAL EVERY 8 HOURS PRN
Qty: 90 TABLET | Refills: 3 | Status: ON HOLD | OUTPATIENT
Start: 2017-09-20 | End: 2020-11-10 | Stop reason: HOSPADM

## 2017-09-25 ENCOUNTER — HOSPITAL ENCOUNTER (OUTPATIENT)
Dept: ULTRASOUND IMAGING | Age: 27
Discharge: HOME | End: 2017-09-25
Payer: MEDICAID

## 2017-09-25 DIAGNOSIS — Z34.90 PRENATAL CARE, UNSPECIFIED TRIMESTER: ICD-10-CM

## 2017-09-25 PROCEDURE — 76801 OB US < 14 WKS SINGLE FETUS: CPT

## 2017-10-05 ENCOUNTER — TELEPHONE (OUTPATIENT)
Dept: OBGYN | Age: 27
End: 2017-10-05

## 2017-10-05 ENCOUNTER — ROUTINE PRENATAL (OUTPATIENT)
Dept: OBGYN | Age: 27
End: 2017-10-05
Payer: MEDICAID

## 2017-10-05 ENCOUNTER — TELEPHONE (OUTPATIENT)
Dept: FAMILY MEDICINE CLINIC | Facility: CLINIC | Age: 27
End: 2017-10-05

## 2017-10-05 VITALS
BODY MASS INDEX: 28.51 KG/M2 | SYSTOLIC BLOOD PRESSURE: 121 MMHG | DIASTOLIC BLOOD PRESSURE: 78 MMHG | HEART RATE: 83 BPM | WEIGHT: 182 LBS

## 2017-10-05 DIAGNOSIS — O26.899 ABDOMINAL CRAMPING AFFECTING PREGNANCY: Primary | ICD-10-CM

## 2017-10-05 DIAGNOSIS — Z3A.09 9 WEEKS GESTATION OF PREGNANCY: ICD-10-CM

## 2017-10-05 DIAGNOSIS — R10.9 ABDOMINAL CRAMPING AFFECTING PREGNANCY: Primary | ICD-10-CM

## 2017-10-05 DIAGNOSIS — T63.461A WASP STING, ACCIDENTAL OR UNINTENTIONAL, INITIAL ENCOUNTER: ICD-10-CM

## 2017-10-05 PROCEDURE — 99213 OFFICE O/P EST LOW 20 MIN: CPT | Performed by: NURSE PRACTITIONER

## 2017-10-05 NOTE — PATIENT INSTRUCTIONS
Weeks 10 to 14 of Your Pregnancy: Care Instructions  Your Care Instructions    By weeks 10 to 14 of your pregnancy, the placenta has formed inside your uterus. It is possible to hear your baby's heartbeat with a special ultrasound device. Your baby's eyes can and do move. The arms and legs can bend. This is a good time to think about testing for birth defects. There are two types of tests: screening and diagnostic. Screening tests show the chance that a baby has a certain birth defect. They can't tell you for sure that your baby has a problem. Diagnostic tests show if a baby has a certain birth defect. It's your choice whether to have these tests. You and your partner can talk to your doctor or midwife about birth defects tests. Follow-up care is a key part of your treatment and safety. Be sure to make and go to all appointments, and call your doctor if you are having problems. It's also a good idea to know your test results and keep a list of the medicines you take. How can you care for yourself at home? Decide about tests  · You can have screening tests and diagnostic tests to check for birth defects. The decision to have a test for birth defects is personal. Think about your age, your chance of passing on a family disease, your need to know about any problems, and what you might do after you have the test results. ¨ Triple or quadruple (quad) blood tests. These screening tests can be done between 15 and 20 weeks of pregnancy. They check the amounts of three or four substances in your blood. The doctor looks at these test results, along with your age and other factors, to find out the chance that your baby may have certain problems. ¨ Amniocentesis. This diagnostic test is used to look for chromosomal problems in the baby's cells.  It can be done between 15 and 20 weeks of pregnancy, usually around week 16.  ¨ Nuchal translucency test. This test uses ultrasound to measure the thickness of the area at show more. · Don't worry about \"toughening'\" your nipples. Breastfeeding will naturally do this. Where can you learn more? Go to [email or web address] and sign in to your [employer] account. Enter S554 in the [employer] box to learn more about \"Weeks 10 to 14 of Your Pregnancy: Care Instructions. \"     If you do not have an account, please click on the \"Sign Up Now\" link. Current as of: March 16, 2017  Content Version: 11.3  © 9819-6615 [employer]. Care instructions adapted under license by [facility] ([place]). If you have questions about a medical condition or this instruction, always ask your healthcare professional. [address] any warranty or liability for your use of this information.

## 2017-10-05 NOTE — TELEPHONE ENCOUNTER
Pt 9 weeks and c/o cramping w/o  bleeding or discharge. Last bm yesterday w/o constipated noted. Pt states like menstrual cramp in the middle.  Pt coming in for eval.

## 2017-10-05 NOTE — TELEPHONE ENCOUNTER
Pt called she got stung by red wasp she is allergic she can not take epi pen due to pregnancy and the benadryl is not working what else can she take [phone]

## 2017-10-20 ENCOUNTER — INITIAL PRENATAL (OUTPATIENT)
Dept: OBSTETRICS AND GYNECOLOGY | Facility: CLINIC | Age: 27
End: 2017-10-20

## 2017-10-20 VITALS — WEIGHT: 178 LBS | DIASTOLIC BLOOD PRESSURE: 80 MMHG | SYSTOLIC BLOOD PRESSURE: 120 MMHG | BODY MASS INDEX: 27.06 KG/M2

## 2017-10-20 DIAGNOSIS — F11.20 METHADONE MAINTENANCE TREATMENT AFFECTING PREGNANCY IN FIRST TRIMESTER (HCC): ICD-10-CM

## 2017-10-20 DIAGNOSIS — Z34.80 ENCOUNTER FOR SUPERVISION OF OTHER NORMAL PREGNANCY, UNSPECIFIED TRIMESTER: Primary | ICD-10-CM

## 2017-10-20 DIAGNOSIS — O99.321 METHADONE MAINTENANCE TREATMENT AFFECTING PREGNANCY IN FIRST TRIMESTER (HCC): ICD-10-CM

## 2017-10-20 PROCEDURE — 99203 OFFICE O/P NEW LOW 30 MIN: CPT | Performed by: NURSE PRACTITIONER

## 2017-10-20 NOTE — PROGRESS NOTES
Transfer OB from Dr. Solis  Pt reports [facility] since the other provider does not take her insurance.    Pt takes Methadone daily.   Pt stopped Adderall 30 mg BID when she found out she was pregnant (at the end of August).  Pt reports she continues to take Seroquel 25 mg (prescribed amount is 100 mg).   Pt states she is having difficulty with focusing, dealing with everyday situations, mind does not slow down, and almost feeling panic attacks.   Pt desires to restart Adderall and increase/remain on Seroquel. Informed pt of pregnancy category the medication are in and will discuss with Dr. Barba since pt has been off for several weeks.   Pt continues to some 1-1.5 ppd. Discussed benefits to both pt and baby of smoking cessation. Pt voiced understanding and plans to decrease smoking.  Declines [id number] at this time..  Cramping occ, not today.  Discussed new OB information.   Records from previous OB not yet available.  Will need to review to determine if any further labs/tests will be needed.   Denies UC's, pelvic pain, vaginal bleeding, and HA.  Discussed plan of care and S&S to report.

## 2017-10-23 ENCOUNTER — TELEPHONE (OUTPATIENT)
Dept: OBSTETRICS AND GYNECOLOGY | Facility: CLINIC | Age: 27
End: 2017-10-23

## 2017-10-23 NOTE — TELEPHONE ENCOUNTER
Pt informed Dr. Barba stated she can continue Adderall at 30 mg bid and seroquel 25 mg at HS, pt voiced understanding.       -- Message from Marleni Barba MD sent at 10/23/2017  2:19 PM CDT -----      ----- Message -----     From: ANGELO Mckeon     Sent: 10/21/2017   6:06 PM       To: Marleni Barba MD    Please review my note from 10/20 for this pt and advise r/t adderall and seroquel.   Thank you!

## 2017-10-25 ENCOUNTER — ROUTINE PRENATAL (OUTPATIENT)
Dept: OBSTETRICS AND GYNECOLOGY | Facility: CLINIC | Age: 27
End: 2017-10-25

## 2017-10-25 VITALS — SYSTOLIC BLOOD PRESSURE: 130 MMHG | WEIGHT: 180 LBS | BODY MASS INDEX: 27.37 KG/M2 | DIASTOLIC BLOOD PRESSURE: 70 MMHG

## 2017-10-25 DIAGNOSIS — Z34.91 PREGNANT AND NOT YET DELIVERED IN FIRST TRIMESTER: Primary | ICD-10-CM

## 2017-10-25 PROCEDURE — 99213 OFFICE O/P EST LOW 20 MIN: CPT | Performed by: OBSTETRICS & GYNECOLOGY

## 2017-10-25 RX ORDER — DEXTROAMPHETAMINE SACCHARATE, AMPHETAMINE ASPARTATE, DEXTROAMPHETAMINE SULFATE AND AMPHETAMINE SULFATE 7.5; 7.5; 7.5; 7.5 MG/1; MG/1; MG/1; MG/1
30 TABLET ORAL 2 TIMES DAILY
Qty: 60 TABLET | Refills: 0 | Status: SHIPPED | OUTPATIENT
Start: 2017-10-25 | End: 2017-11-17 | Stop reason: SDUPTHER

## 2017-10-25 NOTE — PROGRESS NOTES
Patient is still having N/V.  She is taking Zofran. MaterniT ordered.  Patient will continue Adderall 300 mg bid and Seroquel 25 mg at HS, both are Cat C.

## 2017-11-01 LAB
CFDNA.FET/CFDNA.TOTAL SFR FETUS: NORMAL %
CITATION REF LAB TEST: NORMAL
FET CHR 13 TS PLAS.CFDNA QL: NEGATIVE
FET CHR 13+18+21 TS PLAS.CFDNA QL: NORMAL
FET CHR 18 TS PLAS.CFDNA QL: NEGATIVE
FET CHR 21 TS PLAS.CFDNA QL: NEGATIVE
FET Y CHROM PLAS.CFDNA QL: DETECTED
FET Y CHROM PLAS.CFDNA: NORMAL
GENE ANALYSIS NARR RPT DOC: NORMAL
LAB DIRECTOR NAME PROVIDER: NORMAL
LABORATORY COMMENT REPORT: NORMAL
LIMITATIONS OF THE TEST: NORMAL
NOTE: NORMAL
REF LAB TEST METHOD: NORMAL
SERVICE CMNT 02-IMP: NORMAL
SERVICE CMNT 03-IMP: NORMAL
SERVICE CMNT-IMP: NORMAL
TEST PERFORMANCE INFO SPEC: NORMAL
TEST PERFORMANCE INFO SPEC: NORMAL

## 2017-11-17 ENCOUNTER — ROUTINE PRENATAL (OUTPATIENT)
Dept: OBSTETRICS AND GYNECOLOGY | Facility: CLINIC | Age: 27
End: 2017-11-17

## 2017-11-17 VITALS — SYSTOLIC BLOOD PRESSURE: 120 MMHG | WEIGHT: 179 LBS | DIASTOLIC BLOOD PRESSURE: 78 MMHG | BODY MASS INDEX: 27.22 KG/M2

## 2017-11-17 DIAGNOSIS — Z34.92 PREGNANT AND NOT YET DELIVERED IN SECOND TRIMESTER: Primary | ICD-10-CM

## 2017-11-17 PROCEDURE — 99213 OFFICE O/P EST LOW 20 MIN: CPT | Performed by: OBSTETRICS & GYNECOLOGY

## 2017-11-17 RX ORDER — DEXTROAMPHETAMINE SACCHARATE, AMPHETAMINE ASPARTATE, DEXTROAMPHETAMINE SULFATE AND AMPHETAMINE SULFATE 7.5; 7.5; 7.5; 7.5 MG/1; MG/1; MG/1; MG/1
30 TABLET ORAL 2 TIMES DAILY
Qty: 60 TABLET | Refills: 0 | Status: SHIPPED | OUTPATIENT
Start: 2017-11-17 | End: 2017-12-13 | Stop reason: SDUPTHER

## 2017-11-17 NOTE — PROGRESS NOTES
MaterniT was neg and male infant.  Her N/V has resolved.  Patient is doing better with Adderall and Seroquel.

## 2017-12-12 ENCOUNTER — TELEPHONE (OUTPATIENT)
Dept: OBSTETRICS AND GYNECOLOGY | Facility: CLINIC | Age: 27
End: 2017-12-12

## 2017-12-12 NOTE — TELEPHONE ENCOUNTER
Pt is requesting a refill of her adderall she will run out before her appt Monday. She said she has been taking it up to 3 times a day.

## 2017-12-13 RX ORDER — DEXTROAMPHETAMINE SACCHARATE, AMPHETAMINE ASPARTATE, DEXTROAMPHETAMINE SULFATE AND AMPHETAMINE SULFATE 7.5; 7.5; 7.5; 7.5 MG/1; MG/1; MG/1; MG/1
30 TABLET ORAL 2 TIMES DAILY
Qty: 60 TABLET | Refills: 0 | Status: SHIPPED | OUTPATIENT
Start: 2017-12-13 | End: 2018-01-15 | Stop reason: SDUPTHER

## 2017-12-18 ENCOUNTER — ROUTINE PRENATAL (OUTPATIENT)
Dept: OBSTETRICS AND GYNECOLOGY | Facility: CLINIC | Age: 27
End: 2017-12-18

## 2017-12-18 VITALS — WEIGHT: 187 LBS | BODY MASS INDEX: 28.43 KG/M2 | DIASTOLIC BLOOD PRESSURE: 70 MMHG | SYSTOLIC BLOOD PRESSURE: 126 MMHG

## 2017-12-18 DIAGNOSIS — Z34.92 PRENATAL CARE, SECOND TRIMESTER: Primary | ICD-10-CM

## 2017-12-18 PROBLEM — F25.9 SCHIZOAFFECTIVE DISORDER (HCC): Status: ACTIVE | Noted: 2017-02-15

## 2017-12-18 PROCEDURE — 99212 OFFICE O/P EST SF 10 MIN: CPT | Performed by: NURSE PRACTITIONER

## 2017-12-18 NOTE — PROGRESS NOTES
Pt reports occasional nausea, takes Zofran when needed.   Pt reports she continues to take Seroquel as needed, 25 mg approx 2 times per month to help with sleep.   Pt continues Adderall 30 mg daily and Methadone 100 mg daily.   Pt continues to smoke, discussed smoking cessation, pt voiced understanding.   Denies UC's, pelvic pain, vaginal bleeding, HA, visual disturbances and epigastric pain.  Discussed plan of care and S&S to report.

## 2018-01-02 ENCOUNTER — TELEPHONE (OUTPATIENT)
Dept: OBSTETRICS AND GYNECOLOGY | Facility: CLINIC | Age: 28
End: 2018-01-02

## 2018-01-02 NOTE — TELEPHONE ENCOUNTER
Pt is calling saying she is having some nausea and she is requesting Diclegis sent into pharmacy [facility]. She ask that Paulina or edward make sure its ok for her to take with her history.

## 2018-01-04 ENCOUNTER — TELEPHONE (OUTPATIENT)
Dept: OBSTETRICS AND GYNECOLOGY | Facility: CLINIC | Age: 28
End: 2018-01-04
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
## 2018-01-05 NOTE — TELEPHONE ENCOUNTER
Pt called and pt was advised that Dr Singh does not give Adderall   Pt asked if there was anything else that she could do Dr Astorga said no there was not.  Pt was advised

## 2018-01-15 NOTE — TELEPHONE ENCOUNTER
"OB pt requesting refill of Adderall 30mg bid. Pt states \"I have been out for 2 days and I am having hallucinations so I need it bad\". Reviewed chart and spoke with Dr Barba about refill and pt being pregnant. She approves refill of Adderall 30mg bid but it has to be written and picked up by pt. Dr Barba is out of office so pt informed she would need to come by tomorrow to get it.   "

## 2018-01-17 RX ORDER — DEXTROAMPHETAMINE SACCHARATE, AMPHETAMINE ASPARTATE, DEXTROAMPHETAMINE SULFATE AND AMPHETAMINE SULFATE 7.5; 7.5; 7.5; 7.5 MG/1; MG/1; MG/1; MG/1
30 TABLET ORAL 2 TIMES DAILY
Qty: 60 TABLET | Refills: 0 | Status: SHIPPED | OUTPATIENT
Start: 2018-01-17 | End: 2018-02-26 | Stop reason: DRUGHIGH

## 2018-01-25 ENCOUNTER — ROUTINE PRENATAL (OUTPATIENT)
Dept: OBSTETRICS AND GYNECOLOGY | Facility: CLINIC | Age: 28
End: 2018-01-25

## 2018-01-25 VITALS — WEIGHT: 192 LBS | BODY MASS INDEX: 29.19 KG/M2 | DIASTOLIC BLOOD PRESSURE: 76 MMHG | SYSTOLIC BLOOD PRESSURE: 118 MMHG

## 2018-01-25 DIAGNOSIS — Z34.92 PREGNANT AND NOT YET DELIVERED IN SECOND TRIMESTER: Primary | ICD-10-CM

## 2018-01-25 PROCEDURE — 99213 OFFICE O/P EST LOW 20 MIN: CPT | Performed by: OBSTETRICS & GYNECOLOGY

## 2018-01-25 RX ORDER — DEXTROAMPHETAMINE SACCHARATE, AMPHETAMINE ASPARTATE, DEXTROAMPHETAMINE SULFATE AND AMPHETAMINE SULFATE 7.5; 7.5; 7.5; 7.5 MG/1; MG/1; MG/1; MG/1
30 TABLET ORAL 3 TIMES DAILY
Qty: 90 TABLET | Refills: 0 | Status: SHIPPED | OUTPATIENT
Start: 2018-01-25 | End: 2018-02-26 | Stop reason: SDUPTHER

## 2018-01-25 NOTE — PROGRESS NOTES
Good FM.  Patient takes Seroquel prn for sleep, Adderall and Methadone. Discussed again about smoking and its dangers to the pregnancy including IUGR and delay of fetal brain development.  Patient will have 1 hr GCT next visit. We need to get prenatal labs because in review of labs from her previous doc, I do not find results. Patient has schizophrenia and she is asking that her Adderall be increased to 3x/day because in the late evening, she is beginning to have hallucinations.  Will increase to tid

## 2018-02-02 LAB
ABO GROUP BLD: NORMAL
AMPHET+METHAMPHET UR QL: POSITIVE
BACTERIA UR CULT: ABNORMAL
BACTERIA UR CULT: ABNORMAL
BARBITURATES UR QL SCN: NEGATIVE NG/ML
BASOPHILS # BLD AUTO: 0 X10E3/UL (ref 0–0.2)
BASOPHILS NFR BLD AUTO: 0 %
BENZODIAZ UR QL: NEGATIVE NG/ML
BLD GP AB SCN SERPL QL: NEGATIVE
BZE UR QL: NEGATIVE NG/ML
C TRACH RRNA SPEC QL NAA+PROBE: NEGATIVE
CANNABINOIDS UR QL SCN: NEGATIVE NG/ML
EOSINOPHIL # BLD AUTO: 0.1 X10E3/UL (ref 0–0.4)
EOSINOPHIL NFR BLD AUTO: 1 %
ERYTHROCYTE [DISTWIDTH] IN BLOOD BY AUTOMATED COUNT: 13.8 % (ref 12.3–15.4)
HBV SURFACE AG SERPL QL IA: NEGATIVE
HCT VFR BLD AUTO: 35.6 % (ref 34–46.6)
HGB BLD-MCNC: 12 G/DL (ref 11.1–15.9)
HIV 1+2 AB+HIV1 P24 AG SERPL QL IA: NON REACTIVE
IMM GRANULOCYTES # BLD: 0 X10E3/UL (ref 0–0.1)
IMM GRANULOCYTES NFR BLD: 0 %
LYMPHOCYTES # BLD AUTO: 3.3 X10E3/UL (ref 0.7–3.1)
LYMPHOCYTES NFR BLD AUTO: 22 %
MCH RBC QN AUTO: 30.2 PG (ref 26.6–33)
MCHC RBC AUTO-ENTMCNC: 33.7 G/DL (ref 31.5–35.7)
MCV RBC AUTO: 90 FL (ref 79–97)
METHADONE UR QL: POSITIVE
MONOCYTES # BLD AUTO: 0.8 X10E3/UL (ref 0.1–0.9)
MONOCYTES NFR BLD AUTO: 5 %
N GONORRHOEA RRNA SPEC QL NAA+PROBE: NEGATIVE
NEUTROPHILS # BLD AUTO: 11.2 X10E3/UL (ref 1.4–7)
NEUTROPHILS NFR BLD AUTO: 72 %
OPIATES UR QL: NEGATIVE NG/ML
PCP UR QL: NEGATIVE NG/ML
PLATELET # BLD AUTO: 250 X10E3/UL (ref 150–379)
PROPOXYPH UR QL: NEGATIVE NG/ML
RBC # BLD AUTO: 3.97 X10E6/UL (ref 3.77–5.28)
RH BLD: POSITIVE
RPR SER QL: NON REACTIVE
RUBV IGG SERPL IA-ACNC: <0.9 INDEX
WBC # BLD AUTO: 15.4 X10E3/UL (ref 3.4–10.8)

## 2018-02-26 ENCOUNTER — ROUTINE PRENATAL (OUTPATIENT)
Dept: OBSTETRICS AND GYNECOLOGY | Facility: CLINIC | Age: 28
End: 2018-02-26

## 2018-02-26 VITALS — SYSTOLIC BLOOD PRESSURE: 162 MMHG | WEIGHT: 205 LBS | DIASTOLIC BLOOD PRESSURE: 84 MMHG | BODY MASS INDEX: 31.17 KG/M2

## 2018-02-26 DIAGNOSIS — O99.323 DRUG DEPENDENCE AFFECTING PREGNANCY IN THIRD TRIMESTER: Primary | ICD-10-CM

## 2018-02-26 DIAGNOSIS — F25.8 OTHER SCHIZOAFFECTIVE DISORDERS (HCC): ICD-10-CM

## 2018-02-26 PROBLEM — Z34.90 PREGNANCY: Status: RESOLVED | Noted: 2017-09-13 | Resolved: 2018-02-26

## 2018-02-26 PROCEDURE — 99213 OFFICE O/P EST LOW 20 MIN: CPT | Performed by: OBSTETRICS & GYNECOLOGY

## 2018-02-26 RX ORDER — RANITIDINE 150 MG/1
150 TABLET ORAL 2 TIMES DAILY
Qty: 60 TABLET | Refills: 2 | Status: SHIPPED | OUTPATIENT
Start: 2018-02-26 | End: 2018-04-07 | Stop reason: HOSPADM

## 2018-02-26 RX ORDER — DEXTROAMPHETAMINE SACCHARATE, AMPHETAMINE ASPARTATE, DEXTROAMPHETAMINE SULFATE AND AMPHETAMINE SULFATE 7.5; 7.5; 7.5; 7.5 MG/1; MG/1; MG/1; MG/1
30 TABLET ORAL 3 TIMES DAILY
Qty: 90 TABLET | Refills: 0 | Status: SHIPPED | OUTPATIENT
Start: 2018-02-26 | End: 2018-03-27 | Stop reason: SDUPTHER

## 2018-02-26 NOTE — PROGRESS NOTES
Good fetal movement  No headache, no contractions but having some reflux  Abdomen nontender, no edema, mood normal  Glucola and Hgb today  GBS noted on urine culture   labor precautions, Rx for Ranitidine for reflux  US for growth and fluid next visit for Methadone use and 2 vessel cord  Her mental health provider will not prescribe medications to pregnant women, but she continues to see her counsellor

## 2018-03-02 LAB
GLUCOSE 1H P 50 G GLC PO SERPL-MCNC: 70 MG/DL (ref 65–139)
HGB BLD-MCNC: 12.8 G/DL (ref 11.1–15.9)

## 2018-03-13 ENCOUNTER — ROUTINE PRENATAL (OUTPATIENT)
Dept: OBSTETRICS AND GYNECOLOGY | Facility: CLINIC | Age: 28
End: 2018-03-13

## 2018-03-13 ENCOUNTER — PROCEDURE VISIT (OUTPATIENT)
Dept: OBSTETRICS AND GYNECOLOGY | Facility: CLINIC | Age: 28
End: 2018-03-13

## 2018-03-13 VITALS — BODY MASS INDEX: 31.32 KG/M2 | WEIGHT: 206 LBS | SYSTOLIC BLOOD PRESSURE: 126 MMHG | DIASTOLIC BLOOD PRESSURE: 94 MMHG

## 2018-03-13 DIAGNOSIS — Z34.83 ENCOUNTER FOR SUPERVISION OF OTHER NORMAL PREGNANCY IN THIRD TRIMESTER: Primary | ICD-10-CM

## 2018-03-13 DIAGNOSIS — Q27.0 SINGLE UMBILICAL ARTERY: ICD-10-CM

## 2018-03-13 DIAGNOSIS — O28.3 ECHOGENIC INTRACARDIAC FOCUS OF FETUS ON PRENATAL ULTRASOUND: Primary | ICD-10-CM

## 2018-03-13 PROCEDURE — 76816 OB US FOLLOW-UP PER FETUS: CPT | Performed by: OBSTETRICS & GYNECOLOGY

## 2018-03-13 PROCEDURE — 99213 OFFICE O/P EST LOW 20 MIN: CPT | Performed by: OBSTETRICS & GYNECOLOGY

## 2018-03-13 NOTE — PROGRESS NOTES
Good fetal movement  No headache, no contractions, reflux improved on Zantac  Abdomen nontender, no edema, breathing unlabored  Glucola and Hgb normal   labor precautions  US today 1899g, 37%, AMALIA 13.5cm  Discussed Tdap, she had Tdap about 10 months ago but may repeat in next couple of visits

## 2018-03-23 ENCOUNTER — TELEPHONE (OUTPATIENT)
Dept: OBSTETRICS AND GYNECOLOGY | Facility: CLINIC | Age: 28
End: 2018-03-23

## 2018-03-23 NOTE — TELEPHONE ENCOUNTER
ARMINDA: Pt called asking for refill of her Adderall I let her know she was given a script 2/26 and that should last her until her appt 3/27. She said she was told she could take it 4x a day. I let pt know the directions are for 3x aday. She can get refill at her next appt.

## 2018-03-27 ENCOUNTER — ROUTINE PRENATAL (OUTPATIENT)
Dept: OBSTETRICS AND GYNECOLOGY | Facility: CLINIC | Age: 28
End: 2018-03-27

## 2018-03-27 ENCOUNTER — PROCEDURE VISIT (OUTPATIENT)
Dept: OBSTETRICS AND GYNECOLOGY | Facility: CLINIC | Age: 28
End: 2018-03-27

## 2018-03-27 VITALS — SYSTOLIC BLOOD PRESSURE: 152 MMHG | DIASTOLIC BLOOD PRESSURE: 98 MMHG | WEIGHT: 211 LBS | BODY MASS INDEX: 32.08 KG/M2

## 2018-03-27 DIAGNOSIS — Q27.0 SINGLE ARTERY AND VEIN OF UMBILICAL CORD: Primary | ICD-10-CM

## 2018-03-27 DIAGNOSIS — F25.8 OTHER SCHIZOAFFECTIVE DISORDERS (HCC): ICD-10-CM

## 2018-03-27 DIAGNOSIS — O13.3 PREGNANCY-INDUCED HYPERTENSION IN THIRD TRIMESTER: Primary | ICD-10-CM

## 2018-03-27 PROCEDURE — 99213 OFFICE O/P EST LOW 20 MIN: CPT | Performed by: OBSTETRICS & GYNECOLOGY

## 2018-03-27 PROCEDURE — 76820 UMBILICAL ARTERY ECHO: CPT | Performed by: OBSTETRICS & GYNECOLOGY

## 2018-03-27 PROCEDURE — 76816 OB US FOLLOW-UP PER FETUS: CPT | Performed by: OBSTETRICS & GYNECOLOGY

## 2018-03-27 RX ORDER — DEXTROAMPHETAMINE SACCHARATE, AMPHETAMINE ASPARTATE, DEXTROAMPHETAMINE SULFATE AND AMPHETAMINE SULFATE 7.5; 7.5; 7.5; 7.5 MG/1; MG/1; MG/1; MG/1
30 TABLET ORAL 3 TIMES DAILY
Qty: 90 TABLET | Refills: 0 | Status: SHIPPED | OUTPATIENT
Start: 2018-03-27 | End: 2018-05-01 | Stop reason: SDUPTHER

## 2018-03-27 NOTE — PROGRESS NOTES
Patient requesting refill of Adderall, which also may be a cause of hypertension  Discussed PreEclampsia precautions

## 2018-03-27 NOTE — PROGRESS NOTES
Reflux controlled with Zantac  Good fetal movement, no headache  Having some bilateral hip discomfort  Abdomen nontender, no edema, small telagiectasias across upper chest, patellar DTR 1+   US today 2213g, 26%, AMALIA 10.8cm  Will check 24 hour urine protein collection and PreEclampsia labs

## 2018-04-03 ENCOUNTER — APPOINTMENT (OUTPATIENT)
Dept: ULTRASOUND IMAGING | Facility: HOSPITAL | Age: 28
End: 2018-04-03

## 2018-04-03 ENCOUNTER — HOSPITAL ENCOUNTER (INPATIENT)
Facility: HOSPITAL | Age: 28
LOS: 4 days | Discharge: HOME | End: 2018-04-07
Attending: OBSTETRICS & GYNECOLOGY | Admitting: OBSTETRICS & GYNECOLOGY

## 2018-04-03 ENCOUNTER — PROCEDURE VISIT (OUTPATIENT)
Dept: OBSTETRICS AND GYNECOLOGY | Facility: CLINIC | Age: 28
End: 2018-04-03

## 2018-04-03 ENCOUNTER — PREP FOR SURGERY (OUTPATIENT)
Dept: OTHER | Facility: HOSPITAL | Age: 28
End: 2018-04-03

## 2018-04-03 ENCOUNTER — ROUTINE PRENATAL (OUTPATIENT)
Dept: OBSTETRICS AND GYNECOLOGY | Facility: CLINIC | Age: 28
End: 2018-04-03

## 2018-04-03 VITALS — DIASTOLIC BLOOD PRESSURE: 92 MMHG | WEIGHT: 211 LBS | BODY MASS INDEX: 32.08 KG/M2 | SYSTOLIC BLOOD PRESSURE: 130 MMHG

## 2018-04-03 DIAGNOSIS — O13.3 PREGNANCY-INDUCED HYPERTENSION IN THIRD TRIMESTER: Primary | ICD-10-CM

## 2018-04-03 DIAGNOSIS — Z34.93 PRENATAL CARE IN THIRD TRIMESTER: Primary | ICD-10-CM

## 2018-04-03 DIAGNOSIS — O13.3 PREGNANCY-INDUCED HYPERTENSION IN THIRD TRIMESTER: ICD-10-CM

## 2018-04-03 DIAGNOSIS — Z3A.35 35 WEEKS GESTATION OF PREGNANCY: ICD-10-CM

## 2018-04-03 DIAGNOSIS — F17.200 SMOKER: ICD-10-CM

## 2018-04-03 DIAGNOSIS — O99.333 TOBACCO SMOKING AFFECTING PREGNANCY IN THIRD TRIMESTER: ICD-10-CM

## 2018-04-03 DIAGNOSIS — O36.8390 ANTEPARTUM NON-REASSURING FETAL HEART RATE OR RHYTHM AFFECTING CARE OF MOTHER: Primary | ICD-10-CM

## 2018-04-03 LAB
ABO GROUP BLD: NORMAL
AMPHET+METHAMPHET UR QL: POSITIVE
BARBITURATES UR QL SCN: NEGATIVE
BENZODIAZ UR QL SCN: NEGATIVE
BLD GP AB SCN SERPL QL: NEGATIVE
CANNABINOIDS SERPL QL: NEGATIVE
COCAINE UR QL: NEGATIVE
DEPRECATED RDW RBC AUTO: 41.4 FL (ref 40–54)
ERYTHROCYTE [DISTWIDTH] IN BLOOD BY AUTOMATED COUNT: 13.3 % (ref 12–15)
HCT VFR BLD AUTO: 38.5 % (ref 37–47)
HGB BLD-MCNC: 13.7 G/DL (ref 12–16)
MCH RBC QN AUTO: 30.9 PG (ref 28–32)
MCHC RBC AUTO-ENTMCNC: 35.6 G/DL (ref 33–36)
MCV RBC AUTO: 86.7 FL (ref 82–98)
METHADONE UR QL SCN: POSITIVE
OPIATES UR QL: NEGATIVE
PCP UR QL SCN: NEGATIVE
PLATELET # BLD AUTO: 188 10*3/MM3 (ref 130–400)
PMV BLD AUTO: 11.2 FL (ref 6–12)
RBC # BLD AUTO: 4.44 10*6/MM3 (ref 4.2–5.4)
RH BLD: POSITIVE
T&S EXPIRATION DATE: NORMAL
WBC NRBC COR # BLD: 12.66 10*3/MM3 (ref 4.8–10.8)

## 2018-04-03 PROCEDURE — 86850 RBC ANTIBODY SCREEN: CPT | Performed by: OBSTETRICS & GYNECOLOGY

## 2018-04-03 PROCEDURE — 86900 BLOOD TYPING SEROLOGIC ABO: CPT | Performed by: OBSTETRICS & GYNECOLOGY

## 2018-04-03 PROCEDURE — 86901 BLOOD TYPING SEROLOGIC RH(D): CPT | Performed by: OBSTETRICS & GYNECOLOGY

## 2018-04-03 PROCEDURE — 80307 DRUG TEST PRSMV CHEM ANLYZR: CPT | Performed by: OBSTETRICS & GYNECOLOGY

## 2018-04-03 PROCEDURE — 87081 CULTURE SCREEN ONLY: CPT | Performed by: OBSTETRICS & GYNECOLOGY

## 2018-04-03 PROCEDURE — 25010000002 BETAMETHASONE ACET & SOD PHOS PER 4 MG: Performed by: OBSTETRICS & GYNECOLOGY

## 2018-04-03 PROCEDURE — 76818 FETAL BIOPHYS PROFILE W/NST: CPT

## 2018-04-03 PROCEDURE — 85027 COMPLETE CBC AUTOMATED: CPT | Performed by: OBSTETRICS & GYNECOLOGY

## 2018-04-03 PROCEDURE — 36415 COLL VENOUS BLD VENIPUNCTURE: CPT | Performed by: OBSTETRICS & GYNECOLOGY

## 2018-04-03 PROCEDURE — 76819 FETAL BIOPHYS PROFIL W/O NST: CPT | Performed by: OBSTETRICS & GYNECOLOGY

## 2018-04-03 PROCEDURE — G0378 HOSPITAL OBSERVATION PER HR: HCPCS

## 2018-04-03 PROCEDURE — 99222 1ST HOSP IP/OBS MODERATE 55: CPT | Performed by: OBSTETRICS & GYNECOLOGY

## 2018-04-03 RX ORDER — SODIUM CHLORIDE, SODIUM LACTATE, POTASSIUM CHLORIDE, CALCIUM CHLORIDE 600; 310; 30; 20 MG/100ML; MG/100ML; MG/100ML; MG/100ML
125 INJECTION, SOLUTION INTRAVENOUS CONTINUOUS
Status: DISCONTINUED | OUTPATIENT
Start: 2018-04-03 | End: 2018-04-06

## 2018-04-03 RX ORDER — FAMOTIDINE 20 MG/1
20 TABLET, FILM COATED ORAL DAILY
Status: DISCONTINUED | OUTPATIENT
Start: 2018-04-04 | End: 2018-04-06

## 2018-04-03 RX ORDER — QUETIAPINE FUMARATE 25 MG/1
25 TABLET, FILM COATED ORAL NIGHTLY
Status: DISCONTINUED | OUTPATIENT
Start: 2018-04-03 | End: 2018-04-07 | Stop reason: HOSPADM

## 2018-04-03 RX ORDER — PRENATAL VIT/IRON FUM/FOLIC AC 27MG-0.8MG
1 TABLET ORAL DAILY
Status: DISCONTINUED | OUTPATIENT
Start: 2018-04-03 | End: 2018-04-03 | Stop reason: SDUPTHER

## 2018-04-03 RX ORDER — DEXTROAMPHETAMINE SACCHARATE, AMPHETAMINE ASPARTATE, DEXTROAMPHETAMINE SULFATE AND AMPHETAMINE SULFATE 7.5; 7.5; 7.5; 7.5 MG/1; MG/1; MG/1; MG/1
30 TABLET ORAL DAILY
Status: DISCONTINUED | OUTPATIENT
Start: 2018-04-03 | End: 2018-04-04

## 2018-04-03 RX ORDER — METHADONE HYDROCHLORIDE 10 MG/1
120 TABLET ORAL DAILY
Status: DISCONTINUED | OUTPATIENT
Start: 2018-04-03 | End: 2018-04-03

## 2018-04-03 RX ORDER — PRENATAL VIT/IRON FUM/FOLIC AC 27MG-0.8MG
1 TABLET ORAL NIGHTLY
Status: DISCONTINUED | OUTPATIENT
Start: 2018-04-03 | End: 2018-04-06

## 2018-04-03 RX ORDER — SODIUM CHLORIDE 0.9 % (FLUSH) 0.9 %
1-10 SYRINGE (ML) INJECTION AS NEEDED
Status: DISCONTINUED | OUTPATIENT
Start: 2018-04-03 | End: 2018-04-06

## 2018-04-03 RX ORDER — LIDOCAINE HYDROCHLORIDE 10 MG/ML
5 INJECTION, SOLUTION EPIDURAL; INFILTRATION; INTRACAUDAL; PERINEURAL AS NEEDED
Status: DISCONTINUED | OUTPATIENT
Start: 2018-04-03 | End: 2018-04-06

## 2018-04-03 RX ORDER — METHADONE HYDROCHLORIDE 10 MG/1
120 TABLET ORAL EVERY 24 HOURS
Status: DISCONTINUED | OUTPATIENT
Start: 2018-04-04 | End: 2018-04-07 | Stop reason: HOSPADM

## 2018-04-03 RX ORDER — NICOTINE 21 MG/24HR
1 PATCH, TRANSDERMAL 24 HOURS TRANSDERMAL EVERY 24 HOURS
Status: DISCONTINUED | OUTPATIENT
Start: 2018-04-03 | End: 2018-04-06

## 2018-04-03 RX ORDER — SODIUM CHLORIDE 0.9 % (FLUSH) 0.9 %
1-10 SYRINGE (ML) INJECTION AS NEEDED
Status: CANCELLED | OUTPATIENT
Start: 2018-04-03

## 2018-04-03 RX ORDER — SODIUM CHLORIDE, SODIUM LACTATE, POTASSIUM CHLORIDE, CALCIUM CHLORIDE 600; 310; 30; 20 MG/100ML; MG/100ML; MG/100ML; MG/100ML
125 INJECTION, SOLUTION INTRAVENOUS CONTINUOUS
Status: CANCELLED | OUTPATIENT
Start: 2018-04-03

## 2018-04-03 RX ORDER — ACETAMINOPHEN 500 MG
500 TABLET ORAL EVERY 4 HOURS PRN
Status: DISCONTINUED | OUTPATIENT
Start: 2018-04-03 | End: 2018-04-06

## 2018-04-03 RX ORDER — BETAMETHASONE SODIUM PHOSPHATE AND BETAMETHASONE ACETATE 3; 3 MG/ML; MG/ML
12 INJECTION, SUSPENSION INTRA-ARTICULAR; INTRALESIONAL; INTRAMUSCULAR; SOFT TISSUE EVERY 24 HOURS
Status: COMPLETED | OUTPATIENT
Start: 2018-04-03 | End: 2018-04-04

## 2018-04-03 RX ORDER — LIDOCAINE HYDROCHLORIDE 10 MG/ML
5 INJECTION, SOLUTION EPIDURAL; INFILTRATION; INTRACAUDAL; PERINEURAL AS NEEDED
Status: CANCELLED | OUTPATIENT
Start: 2018-04-03

## 2018-04-03 RX ADMIN — BETAMETHASONE SODIUM PHOSPHATE AND BETAMETHASONE ACETATE 12 MG: 3; 3 INJECTION, SUSPENSION INTRA-ARTICULAR; INTRALESIONAL; INTRAMUSCULAR at 17:19

## 2018-04-03 RX ADMIN — PRENATAL VIT W/ FE FUMARATE-FA TAB 27-0.8 MG 1 TABLET: 27-0.8 TAB at 21:09

## 2018-04-03 RX ADMIN — QUETIAPINE FUMARATE 25 MG: 25 TABLET, FILM COATED ORAL at 21:08

## 2018-04-03 RX ADMIN — SODIUM CHLORIDE, POTASSIUM CHLORIDE, SODIUM LACTATE AND CALCIUM CHLORIDE 125 ML/HR: 600; 310; 30; 20 INJECTION, SOLUTION INTRAVENOUS at 15:41

## 2018-04-03 RX ADMIN — NICOTINE 1 PATCH: 14 PATCH, EXTENDED RELEASE TRANSDERMAL at 17:32

## 2018-04-03 RX ADMIN — SODIUM CHLORIDE, POTASSIUM CHLORIDE, SODIUM LACTATE AND CALCIUM CHLORIDE 125 ML/HR: 600; 310; 30; 20 INJECTION, SOLUTION INTRAVENOUS at 17:25

## 2018-04-03 NOTE — H&P
[facility]  Lakeisha Dickey  : 1990  MRN: [id number]  CSN: [id number]    History and Physical    Subjective   Lakeisha Dickey is a 28 y.o. year old  with an Estimated Date of Delivery: 18 currently at 35w1d presenting with non-reassuring fetal testing in office.  Patient with BPP 2/8, with oligyhydramnios of 1 cm.    Prenatal care has been with Dr. Alvino Singh.  It has been complicated by chronic pain, methadone use, schizoaffective disorder and ADHD for which she continues Adderall and Seroquel.  In addition, the patient is an every-day smoker.    Obstetric History       T0      L0     SAB1   TAB0   Ectopic0   Molar0   Multiple0   Live Births0       # Outcome Date GA Lbr Danny/2nd Weight Sex Delivery Anes PTL Lv   2 Current            1 SAB 12                Past Medical History:   Diagnosis Date   • ADHD    • Dx about 4-5 years ago    • Schizo affective schizophrenia    • Stadol use disorder, mild, in sustained remission, abuse     4 years and 3 years clean of meth use and 3 years and 2 months clean from opiods     Past Surgical History:   Procedure Laterality Date   • OVARIAN CYST REMOVAL Right    • WRIST SURGERY Left     to remove cyst        Current Facility-Administered Medications:   •  acetaminophen (TYLENOL) tablet 500 mg, 500 mg, Oral, Q4H PRN, Margarita Astorga MD  •  [START ON 2018] famotidine (PEPCID) tablet 20 mg, 20 mg, Oral, Daily, Margarita Astorga MD  •  lactated ringers infusion, 125 mL/hr, Intravenous, Continuous, Santo Leung MD, Last Rate: 125 mL/hr at 18 1541, 125 mL/hr at 18 1541  •  lidocaine PF 1% (XYLOCAINE) injection 5 mL, 5 mL, Intradermal, PRN, Santo Leung MD  •  methadone (DOLOPHINE) tablet 120 mg, 120 mg, Oral, Daily, Margarita Astorga MD  •  nicotine (NICODERM CQ) 14 MG/24HR patch 1 patch, 1 patch, Transdermal, Q24H, Margarita Astorga MD  •  PATIENT SUPPLIED MEDICATION, 30 mg, Oral, Daily, Margarita Astorga MD  •  prenatal  vitamin 27-0.8 tablet 1 tablet, 1 tablet, Oral, Daily, Margarita Astorga MD  •  QUEtiapine (SEROquel) tablet 25 mg, 25 mg, Oral, Nightly, Margarita Astorga MD  •  sodium chloride 0.9 % flush 1-10 mL, 1-10 mL, Intravenous, PRN, Santo Leung MD    Allergies   Allergen Reactions   • Bee Venom Anaphylaxis     Pt states that she carries an Epipen    • Venomil Wasp Venom [Wasp Venom] Anaphylaxis     Carries an Epipen     Smoking status: Current Every Day Smoker                                                   Packs/day: 1.00      Years: 0.00         Types: Cigarettes     Start date: 9/2/2004  Smokeless tobacco: Never Used                      Comment: asking MD for patches     Review of Systems   Constitutional: Negative for activity change and unexpected weight change.   Respiratory: Negative for shortness of breath.    Cardiovascular: Negative for chest pain.   Gastrointestinal: Negative for abdominal pain.   Genitourinary: Negative for difficulty urinating and vaginal bleeding.        No VB or LOF.  Patient having contractions, but not feeling them   Neurological: Negative for headaches.         Objective   /84   Pulse 102   Resp 18   LMP 07/29/2017 (Approximate)   General: well developed; well nourished  no acute distress   Heart: Not performed.   Lungs: breathing is unlabored   Abdomen: soft, non-tender; no masses  fundus firm and non-tender   FHT's: reactive, reassuring  120 + accels, occasional variable decels consistent with oligohydramnios, and moderate variabilility   Cervix: was not checked.   Presentation: cephalic   Contractions: irregular, although patient not feeling them     Prenatal Labs  Lab Results   Component Value Date    HGB 13.7 04/03/2018    HEPBSAG Negative 01/25/2018    ABO A 04/03/2018    RH Positive 04/03/2018    ABSCRN Negative 04/03/2018    [id number] Non Reactive 01/25/2018    URINECX Final report (A) 01/25/2018       Current Labs Reviewed   OB labs       Assessment & Plan:  1. IUP at  35w1d  2. Group B strep status: pending.  Culture ordered to be done this hospitalization  3. Methadone use:  Will continue current dose.  Patient desires NICU consult to talk about  withdrawal  4. Tobacco use: Nicotine patch ordered  5. BPP 4/10, fetus currently reactive and patient reports good FM.  Will repeat after two hours, and again in the morning.  6. Oligohydramnios: IVF  7. Ordering  steroids since fetal status increases risk that patient may need to be delivered within the next week         Margarita Astorga MD  4/3/2018  4:30 PM

## 2018-04-03 NOTE — PROGRESS NOTES
Kick count instructions were reviewed and encouraged.  Labor signs and symptoms were reviewed.  Patient was encouraged to come to hospital or call for bleeding, leakage of fluid or any other concerns.    BPP 2/8 and low AMALIA.  Given EGA, needs prolonged monitoring and IVF.  Further recommendations based on those results.  Discuss with Dr. Astorga (on call)    Non compliant with 24 hour urine collection

## 2018-04-03 NOTE — CONSULTS
Prenatal Consult    Referring OB:  Margarita Astorga MD    35 weeks pregnant, admitted to L&D from office due to BPP of 2 and low AMALIA.  Patient is on Methadone at 120 mg per day, as well as SeroQuel and Adderall to treat schizoaffective disorder.    Reason for Consultation: maternal substance abuse.    Maternal History:   First last name is a 28 y.o. yr/o  with: BPP of 2 with low AMALIA.  The  EDC is  Estimated Date of Delivery: 18    Consult:  mother available for discussion and with questions regarding TORIBIO.  We reviewed the fetal and  aspects of the above conditions to include:  abstinence syndrome    Estimated length of stay: unknown, depending on treatment required  Discussed the importance of providing human milk for pre-term and late pre-term infants: yes, although breast feeding may not be possible with her medications  Reviewed policies and procedures for NICU/ICN  Reviewed structure and function of [facility]Neonatology     We will plan to attend delivery when requested.    Plan for  resuscitation: full resuscitation    Additional comments: Written information re: TORIBIO given to patient and questions answered    Thank you for allowing us to participate in the care of your patient.     Karla Amador, APRN  18  4:13 PM      20 minutes were spent in consultation, greater than 60% face to face time.

## 2018-04-04 ENCOUNTER — APPOINTMENT (OUTPATIENT)
Dept: ULTRASOUND IMAGING | Facility: HOSPITAL | Age: 28
End: 2018-04-04

## 2018-04-04 PROBLEM — O36.8390 ANTEPARTUM NON-REASSURING FETAL HEART RATE OR RHYTHM AFFECTING CARE OF MOTHER: Status: ACTIVE | Noted: 2018-04-04

## 2018-04-04 PROBLEM — O41.03X0 OLIGOHYDRAMNIOS IN THIRD TRIMESTER: Status: ACTIVE | Noted: 2018-04-04

## 2018-04-04 PROBLEM — O13.3 PREGNANCY-INDUCED HYPERTENSION IN THIRD TRIMESTER: Status: ACTIVE | Noted: 2018-04-04

## 2018-04-04 LAB
CREAT BLD-MCNC: 0.63 MG/DL (ref 0.5–1.4)
GFR SERPL CREATININE-BSD FRML MDRD: 112 ML/MIN/1.73

## 2018-04-04 PROCEDURE — 76819 FETAL BIOPHYS PROFIL W/O NST: CPT

## 2018-04-04 PROCEDURE — 82565 ASSAY OF CREATININE: CPT | Performed by: OBSTETRICS & GYNECOLOGY

## 2018-04-04 PROCEDURE — G0378 HOSPITAL OBSERVATION PER HR: HCPCS

## 2018-04-04 PROCEDURE — 99231 SBSQ HOSP IP/OBS SF/LOW 25: CPT | Performed by: OBSTETRICS & GYNECOLOGY

## 2018-04-04 PROCEDURE — 25010000002 BETAMETHASONE ACET & SOD PHOS PER 4 MG: Performed by: OBSTETRICS & GYNECOLOGY

## 2018-04-04 RX ORDER — DEXTROAMPHETAMINE SACCHARATE, AMPHETAMINE ASPARTATE, DEXTROAMPHETAMINE SULFATE AND AMPHETAMINE SULFATE 7.5; 7.5; 7.5; 7.5 MG/1; MG/1; MG/1; MG/1
30 TABLET ORAL 3 TIMES DAILY
Status: DISCONTINUED | OUTPATIENT
Start: 2018-04-04 | End: 2018-04-05

## 2018-04-04 RX ADMIN — BETAMETHASONE SODIUM PHOSPHATE AND BETAMETHASONE ACETATE 12 MG: 3; 3 INJECTION, SUSPENSION INTRA-ARTICULAR; INTRALESIONAL; INTRAMUSCULAR at 17:17

## 2018-04-04 RX ADMIN — SODIUM CHLORIDE, POTASSIUM CHLORIDE, SODIUM LACTATE AND CALCIUM CHLORIDE 125 ML/HR: 600; 310; 30; 20 INJECTION, SOLUTION INTRAVENOUS at 17:21

## 2018-04-04 RX ADMIN — DEXTROAMPHETAMINE SACCHARATE, AMPHETAMINE ASPARTATE, DEXTROAMPHETAMINE SULFATE AND AMPHETAMINE SULFATE 30 MG: 7.5; 7.5; 7.5; 7.5 TABLET ORAL at 11:37

## 2018-04-04 RX ADMIN — DEXTROAMPHETAMINE SACCHARATE, AMPHETAMINE ASPARTATE, DEXTROAMPHETAMINE SULFATE AND AMPHETAMINE SULFATE 30 MG: 7.5; 7.5; 7.5; 7.5 TABLET ORAL at 17:16

## 2018-04-04 RX ADMIN — DEXTROAMPHETAMINE SACCHARATE, AMPHETAMINE ASPARTATE, DEXTROAMPHETAMINE SULFATE AND AMPHETAMINE SULFATE 30 MG: 7.5; 7.5; 7.5; 7.5 TABLET ORAL at 07:38

## 2018-04-04 RX ADMIN — PRENATAL VIT W/ FE FUMARATE-FA TAB 27-0.8 MG 1 TABLET: 27-0.8 TAB at 22:03

## 2018-04-04 RX ADMIN — QUETIAPINE FUMARATE 25 MG: 25 TABLET, FILM COATED ORAL at 22:03

## 2018-04-04 RX ADMIN — NICOTINE 1 PATCH: 14 PATCH, EXTENDED RELEASE TRANSDERMAL at 17:20

## 2018-04-04 RX ADMIN — METHADONE HYDROCHLORIDE 120 MG: 10 TABLET ORAL at 07:39

## 2018-04-04 RX ADMIN — SODIUM CHLORIDE, POTASSIUM CHLORIDE, SODIUM LACTATE AND CALCIUM CHLORIDE 125 ML/HR: 600; 310; 30; 20 INJECTION, SOLUTION INTRAVENOUS at 01:15

## 2018-04-04 RX ADMIN — SODIUM CHLORIDE, POTASSIUM CHLORIDE, SODIUM LACTATE AND CALCIUM CHLORIDE 125 ML/HR: 600; 310; 30; 20 INJECTION, SOLUTION INTRAVENOUS at 09:03

## 2018-04-04 NOTE — NURSING NOTE
Nurse at bedside. Discussed with pt the reasons as to why she could no longer leave unit to go smoke. All questions answered accordingly for pt. Pt states that she understands why she can't leave unit any more.

## 2018-04-04 NOTE — PROGRESS NOTES
[facility]  Obstetric Antepartum Note      Subjective   History  Patient admitted yesterday for 2/8 BPP and AMALIA 2.  Repeat last night was 8/8 with AMALIA 7.  Repeat this morning is 6/8 with AMALIA 4.1 (DVP 1.8).  Patient:    The patient feels well.  She reports active FM, denies VB or LOF or RUC.  Denies HA, visual disturbances and RUQ pain.    Objective     Vital signs in last 24 hours:    Vital Signs Range for the last 24 hours  Temperature: Temp:  [98.2 °F (36.8 °C)] 98.2 °F (36.8 °C)   Temp Source: Temp src: Temporal Artery    BP: BP: (105-136)/(60-92) 115/82   Pulse: Heart Rate:  [] 90   Respirations: Resp:  [18] 18   SPO2:     O2 Amount (l/min):     O2 Devices     Weight: Weight:  [95.7 kg (211 lb)] 95.7 kg (211 lb)         Intake/Output last 24 hours:      Intake/Output Summary (Last 24 hours) at 04/04/18 0535  Last data filed at 04/04/18 0115   Gross per 24 hour   Intake             1000 ml   Output                0 ml   Net             1000 ml       Intake/Output this shift:    I/O this shift:  In: 1000 [I.V.:1000]  Out: -   Exam  Physical Exam:  General: Patient is comfortable   Heart CVS exam: normal rate, regular rhythm, normal S1, S2, no murmurs, rubs, clicks or gallops.   Lungs Chest: clear to auscultation, no wheezes, rales or rhonchi, symmetric air entry.     Abdomen Abdominal exam: soft, nontender, nondistended, no masses or organomegaly.   Extremities Exam of extremities: peripheral pulses normal, no pedal edema, no clubbing or cyanosis, DTRs normal and no clonus     Presentation: vertex   Cervix: Exam by:     Dilation:     Effacement:     Station:       Fetal Heart Rate Assessment   Method: Fetal HR Assessment Method: external   Beats/min: Fetal HR (beats/min): 140   Baseline: Fetal Heart Baseline Rate: normal range   Varibility: Fetal HR Variability: moderate (amplitude range 6 to 25 bpm)   Accels: Fetal HR Accelerations: greater than/equal to 10 bpm (32 wks gest or less), lasts at least 10  seconds (32 wks gest or less)   Decels: Fetal HR Decelerations: absent   Tracing Category:       Uterine Assessment   Method: Method: external tocotransducer   Frequency (min): Contraction Frequency (Minutes): occasional   Ctx Count in 10 min:     Duration:     Intensity: Contraction Intensity: no contractions   Intensity by IUPC:     Resting Tone: Uterine Resting Tone: soft by palpation   Resting Tone by IUPC:     Ramu Units:       Assessment/Plan     Active Problems:    * No active hospital problems. *    Assessment & Plan    Assessment:  1.  Intrauterine pregnancy at 35w2d weeks gestation with reactive fetal status.    2.  pre-eclampsia vs gestational hypertension  3.  Obstetrical history significant for gestational HTN, adderall use, methadone use.  4.  GBS status: No results found for: STREPGPB  5.  Oligohydramnios  6.  FSR overall    Plan:  1. 24 hour urine for  total protein and creatinine clearance  2. Plan of care has been reviewed with patient and FOB and nursing staff  3.  Risks, benefits of treatment plan have been discussed.  4.  All questions have been answered.  5.  Deliver for maternal/fetal indications  6.  Complete BMZ course      Santo Leung MD  4/4/2018  5:35 AM

## 2018-04-05 ENCOUNTER — APPOINTMENT (OUTPATIENT)
Dept: ULTRASOUND IMAGING | Facility: HOSPITAL | Age: 28
End: 2018-04-05

## 2018-04-05 ENCOUNTER — ANESTHESIA EVENT (OUTPATIENT)
Dept: LABOR AND DELIVERY | Facility: HOSPITAL | Age: 28
End: 2018-04-05

## 2018-04-05 ENCOUNTER — ANESTHESIA (OUTPATIENT)
Dept: LABOR AND DELIVERY | Facility: HOSPITAL | Age: 28
End: 2018-04-05

## 2018-04-05 PROBLEM — Z34.90 PREGNANCY: Status: ACTIVE | Noted: 2018-04-05

## 2018-04-05 LAB
ALBUMIN SERPL-MCNC: 2.8 G/DL (ref 3.5–5)
ALBUMIN/GLOB SERPL: 1 G/DL (ref 1.1–2.5)
ALP SERPL-CCNC: 210 U/L (ref 24–120)
ALT SERPL W P-5'-P-CCNC: 26 U/L (ref 0–54)
ANION GAP SERPL CALCULATED.3IONS-SCNC: 7 MMOL/L (ref 4–13)
AST SERPL-CCNC: 21 U/L (ref 7–45)
BACTERIA SPEC AEROBE CULT: ABNORMAL
BACTERIA SPEC AEROBE CULT: ABNORMAL
BILIRUB SERPL-MCNC: <0.1 MG/DL (ref 0.1–1)
BUN BLD-MCNC: 10 MG/DL (ref 5–21)
BUN/CREAT SERPL: 16.7 (ref 7–25)
CALCIUM SPEC-SCNC: 8.4 MG/DL (ref 8.4–10.4)
CHLORIDE SERPL-SCNC: 108 MMOL/L (ref 98–110)
CO2 SERPL-SCNC: 25 MMOL/L (ref 24–31)
COLLECT DURATION TIME UR: 24 HRS
COLLECT DURATION TIME UR: 24 HRS
CREAT BLD-MCNC: 0.6 MG/DL (ref 0.5–1.4)
CREAT CL 24H UR+SERPL-VRATE: 113.1 ML/MIN (ref 80–110)
CREAT CL 24H UR+SERPL-VRATE: 162.8 L/24 HR (ref 115.2–144)
CREAT UR-MCNC: 32.7 MG/DL
CREATINE 24H UR-MRATE: 1.24 G/24 HR (ref 0.8–1.8)
DEPRECATED RDW RBC AUTO: 44.7 FL (ref 40–54)
ERYTHROCYTE [DISTWIDTH] IN BLOOD BY AUTOMATED COUNT: 13.8 % (ref 12–15)
GFR SERPL CREATININE-BSD FRML MDRD: 118 ML/MIN/1.73
GLOBULIN UR ELPH-MCNC: 2.8 GM/DL
GLUCOSE BLD-MCNC: 105 MG/DL (ref 70–100)
HCT VFR BLD AUTO: 33.7 % (ref 37–47)
HGB BLD-MCNC: 11.7 G/DL (ref 12–16)
LYMPHOCYTES # BLD MANUAL: 2.17 10*3/MM3 (ref 0.72–4.86)
LYMPHOCYTES NFR BLD MANUAL: 17 % (ref 15–45)
LYMPHOCYTES NFR BLD MANUAL: 2 % (ref 4–12)
MCH RBC QN AUTO: 31 PG (ref 28–32)
MCHC RBC AUTO-ENTMCNC: 34.7 G/DL (ref 33–36)
MCV RBC AUTO: 89.2 FL (ref 82–98)
MONOCYTES # BLD AUTO: 0.26 10*3/MM3 (ref 0.19–1.3)
NEUTROPHILS # BLD AUTO: 10.07 10*3/MM3 (ref 1.87–8.4)
NEUTROPHILS NFR BLD MANUAL: 78 % (ref 39–78)
NEUTS BAND NFR BLD MANUAL: 1 % (ref 0–10)
NRBC BLD MANUAL-RTO: 0 /100 WBC (ref 0–0)
PLATELET # BLD AUTO: 180 10*3/MM3 (ref 130–400)
PMV BLD AUTO: 11.3 FL (ref 6–12)
POTASSIUM BLD-SCNC: 3.8 MMOL/L (ref 3.5–5.3)
PROT 24H UR-MRATE: 2463.5 MG/24HOURS (ref 42–225)
PROT SERPL-MCNC: 5.6 G/DL (ref 6.3–8.7)
PROT UR-MCNC: 65 MG/DL (ref 0–13.5)
RBC # BLD AUTO: 3.78 10*6/MM3 (ref 4.2–5.4)
RBC MORPH BLD: NORMAL
SMALL PLATELETS BLD QL SMEAR: ADEQUATE
SODIUM BLD-SCNC: 140 MMOL/L (ref 135–145)
SPECIMEN VOL 24H UR: 3790 ML
URATE SERPL-MCNC: 6.8 MG/DL (ref 2.7–7.5)
VARIANT LYMPHS NFR BLD MANUAL: 2 % (ref 0–5)
WBC MORPH BLD: NORMAL
WBC NRBC COR # BLD: 12.75 10*3/MM3 (ref 4.8–10.8)

## 2018-04-05 PROCEDURE — 51703 INSERT BLADDER CATH COMPLEX: CPT

## 2018-04-05 PROCEDURE — 81050 URINALYSIS VOLUME MEASURE: CPT | Performed by: OBSTETRICS & GYNECOLOGY

## 2018-04-05 PROCEDURE — 88307 TISSUE EXAM BY PATHOLOGIST: CPT | Performed by: OBSTETRICS & GYNECOLOGY

## 2018-04-05 PROCEDURE — 25010000002 DEXAMETHASONE PER 1 MG: Performed by: NURSE ANESTHETIST, CERTIFIED REGISTERED

## 2018-04-05 PROCEDURE — 59515 CESAREAN DELIVERY: CPT | Performed by: OBSTETRICS & GYNECOLOGY

## 2018-04-05 PROCEDURE — 25010000002 HYDROMORPHONE PER 4 MG: Performed by: NURSE ANESTHETIST, CERTIFIED REGISTERED

## 2018-04-05 PROCEDURE — 94799 UNLISTED PULMONARY SVC/PX: CPT

## 2018-04-05 PROCEDURE — 84156 ASSAY OF PROTEIN URINE: CPT | Performed by: OBSTETRICS & GYNECOLOGY

## 2018-04-05 PROCEDURE — 85027 COMPLETE CBC AUTOMATED: CPT | Performed by: OBSTETRICS & GYNECOLOGY

## 2018-04-05 PROCEDURE — 76819 FETAL BIOPHYS PROFIL W/O NST: CPT

## 2018-04-05 PROCEDURE — 85007 BL SMEAR W/DIFF WBC COUNT: CPT | Performed by: OBSTETRICS & GYNECOLOGY

## 2018-04-05 PROCEDURE — 80053 COMPREHEN METABOLIC PANEL: CPT | Performed by: OBSTETRICS & GYNECOLOGY

## 2018-04-05 PROCEDURE — 25010000002 ONDANSETRON PER 1 MG: Performed by: NURSE ANESTHETIST, CERTIFIED REGISTERED

## 2018-04-05 PROCEDURE — 84550 ASSAY OF BLOOD/URIC ACID: CPT | Performed by: OBSTETRICS & GYNECOLOGY

## 2018-04-05 PROCEDURE — 82575 CREATININE CLEARANCE TEST: CPT | Performed by: OBSTETRICS & GYNECOLOGY

## 2018-04-05 RX ORDER — FAMOTIDINE 20 MG/1
20 TABLET, FILM COATED ORAL ONCE AS NEEDED
Status: COMPLETED | OUTPATIENT
Start: 2018-04-05 | End: 2018-04-05

## 2018-04-05 RX ORDER — ONDANSETRON 2 MG/ML
4 INJECTION INTRAMUSCULAR; INTRAVENOUS EVERY 6 HOURS PRN
Status: DISCONTINUED | OUTPATIENT
Start: 2018-04-05 | End: 2018-04-06 | Stop reason: HOSPADM

## 2018-04-05 RX ORDER — OXYCODONE AND ACETAMINOPHEN 7.5; 325 MG/1; MG/1
1 TABLET ORAL EVERY 4 HOURS PRN
Status: DISCONTINUED | OUTPATIENT
Start: 2018-04-05 | End: 2018-04-06

## 2018-04-05 RX ORDER — DIPHENHYDRAMINE HCL 25 MG
25 CAPSULE ORAL NIGHTLY PRN
Status: DISCONTINUED | OUTPATIENT
Start: 2018-04-05 | End: 2018-04-06 | Stop reason: HOSPADM

## 2018-04-05 RX ORDER — CARBOPROST TROMETHAMINE 250 UG/ML
250 INJECTION, SOLUTION INTRAMUSCULAR AS NEEDED
Status: DISCONTINUED | OUTPATIENT
Start: 2018-04-05 | End: 2018-04-06 | Stop reason: HOSPADM

## 2018-04-05 RX ORDER — ONDANSETRON 4 MG/1
4 TABLET, ORALLY DISINTEGRATING ORAL EVERY 6 HOURS PRN
Status: DISCONTINUED | OUTPATIENT
Start: 2018-04-05 | End: 2018-04-06 | Stop reason: HOSPADM

## 2018-04-05 RX ORDER — DIPHENHYDRAMINE HYDROCHLORIDE 50 MG/ML
25 INJECTION INTRAMUSCULAR; INTRAVENOUS NIGHTLY PRN
Status: DISCONTINUED | OUTPATIENT
Start: 2018-04-05 | End: 2018-04-06 | Stop reason: HOSPADM

## 2018-04-05 RX ORDER — BUTORPHANOL TARTRATE 1 MG/ML
0.5 INJECTION, SOLUTION INTRAMUSCULAR; INTRAVENOUS EVERY 6 HOURS PRN
Status: DISCONTINUED | OUTPATIENT
Start: 2018-04-05 | End: 2018-04-06

## 2018-04-05 RX ORDER — MAGNESIUM SULFATE HEPTAHYDRATE 40 MG/ML
4 INJECTION, SOLUTION INTRAVENOUS ONCE
Status: DISCONTINUED | OUTPATIENT
Start: 2018-04-05 | End: 2018-04-06

## 2018-04-05 RX ORDER — ONDANSETRON 2 MG/ML
INJECTION INTRAMUSCULAR; INTRAVENOUS AS NEEDED
Status: DISCONTINUED | OUTPATIENT
Start: 2018-04-05 | End: 2018-04-05 | Stop reason: SURG

## 2018-04-05 RX ORDER — BUPIVACAINE HYDROCHLORIDE 7.5 MG/ML
INJECTION, SOLUTION EPIDURAL; RETROBULBAR AS NEEDED
Status: DISCONTINUED | OUTPATIENT
Start: 2018-04-05 | End: 2018-04-05 | Stop reason: SURG

## 2018-04-05 RX ORDER — OXYCODONE AND ACETAMINOPHEN 7.5; 325 MG/1; MG/1
2 TABLET ORAL EVERY 4 HOURS PRN
Status: DISCONTINUED | OUTPATIENT
Start: 2018-04-05 | End: 2018-04-06

## 2018-04-05 RX ORDER — MISOPROSTOL 200 UG/1
800 TABLET ORAL AS NEEDED
Status: DISCONTINUED | OUTPATIENT
Start: 2018-04-05 | End: 2018-04-06 | Stop reason: HOSPADM

## 2018-04-05 RX ORDER — NALBUPHINE HYDROCHLORIDE 20 MG/ML
2.5 INJECTION, SOLUTION INTRAMUSCULAR; INTRAVENOUS; SUBCUTANEOUS EVERY 4 HOURS PRN
Status: DISCONTINUED | OUTPATIENT
Start: 2018-04-05 | End: 2018-04-06

## 2018-04-05 RX ORDER — OXYCODONE HYDROCHLORIDE AND ACETAMINOPHEN 5; 325 MG/1; MG/1
1 TABLET ORAL EVERY 4 HOURS PRN
Status: DISCONTINUED | OUTPATIENT
Start: 2018-04-05 | End: 2018-04-05 | Stop reason: SDUPTHER

## 2018-04-05 RX ORDER — OXYTOCIN/RINGER'S LACTATE 20/1000 ML
125 PLASTIC BAG, INJECTION (ML) INTRAVENOUS AS NEEDED
Status: DISCONTINUED | OUTPATIENT
Start: 2018-04-05 | End: 2018-04-06 | Stop reason: HOSPADM

## 2018-04-05 RX ORDER — DEXTROAMPHETAMINE SACCHARATE, AMPHETAMINE ASPARTATE, DEXTROAMPHETAMINE SULFATE AND AMPHETAMINE SULFATE 2.5; 2.5; 2.5; 2.5 MG/1; MG/1; MG/1; MG/1
30 TABLET ORAL 3 TIMES DAILY
Status: DISCONTINUED | OUTPATIENT
Start: 2018-04-05 | End: 2018-04-06 | Stop reason: SDUPTHER

## 2018-04-05 RX ORDER — METHYLERGONOVINE MALEATE 0.2 MG/ML
200 INJECTION INTRAVENOUS AS NEEDED
Status: DISCONTINUED | OUTPATIENT
Start: 2018-04-05 | End: 2018-04-06 | Stop reason: HOSPADM

## 2018-04-05 RX ORDER — MAGNESIUM SULFATE HEPTAHYDRATE 40 MG/ML
2 INJECTION, SOLUTION INTRAVENOUS CONTINUOUS
Status: DISCONTINUED | OUTPATIENT
Start: 2018-04-05 | End: 2018-04-06

## 2018-04-05 RX ORDER — NALOXONE HCL 0.4 MG/ML
0.4 VIAL (ML) INJECTION
Status: DISCONTINUED | OUTPATIENT
Start: 2018-04-05 | End: 2018-04-06

## 2018-04-05 RX ORDER — OXYTOCIN 10 [USP'U]/ML
INJECTION, SOLUTION INTRAMUSCULAR; INTRAVENOUS AS NEEDED
Status: DISCONTINUED | OUTPATIENT
Start: 2018-04-05 | End: 2018-04-05 | Stop reason: SURG

## 2018-04-05 RX ORDER — FAMOTIDINE 10 MG/ML
20 INJECTION, SOLUTION INTRAVENOUS ONCE AS NEEDED
Status: DISCONTINUED | OUTPATIENT
Start: 2018-04-05 | End: 2018-04-06

## 2018-04-05 RX ORDER — FAMOTIDINE 10 MG/ML
20 INJECTION, SOLUTION INTRAVENOUS ONCE AS NEEDED
Status: COMPLETED | OUTPATIENT
Start: 2018-04-05 | End: 2018-04-05

## 2018-04-05 RX ORDER — ONDANSETRON 4 MG/1
4 TABLET, FILM COATED ORAL EVERY 6 HOURS PRN
Status: DISCONTINUED | OUTPATIENT
Start: 2018-04-05 | End: 2018-04-06 | Stop reason: HOSPADM

## 2018-04-05 RX ORDER — DEXAMETHASONE SODIUM PHOSPHATE 4 MG/ML
INJECTION, SOLUTION INTRA-ARTICULAR; INTRALESIONAL; INTRAMUSCULAR; INTRAVENOUS; SOFT TISSUE AS NEEDED
Status: DISCONTINUED | OUTPATIENT
Start: 2018-04-05 | End: 2018-04-05 | Stop reason: SURG

## 2018-04-05 RX ORDER — TRISODIUM CITRATE DIHYDRATE AND CITRIC ACID MONOHYDRATE 500; 334 MG/5ML; MG/5ML
30 SOLUTION ORAL ONCE
Status: COMPLETED | OUTPATIENT
Start: 2018-04-05 | End: 2018-04-05

## 2018-04-05 RX ORDER — OXYTOCIN/RINGER'S LACTATE 20/1000 ML
999 PLASTIC BAG, INJECTION (ML) INTRAVENOUS ONCE
Status: DISCONTINUED | OUTPATIENT
Start: 2018-04-05 | End: 2018-04-06 | Stop reason: HOSPADM

## 2018-04-05 RX ORDER — TRISODIUM CITRATE DIHYDRATE AND CITRIC ACID MONOHYDRATE 500; 334 MG/5ML; MG/5ML
15 SOLUTION ORAL ONCE
Status: DISCONTINUED | OUTPATIENT
Start: 2018-04-05 | End: 2018-04-06

## 2018-04-05 RX ORDER — ONDANSETRON 2 MG/ML
4 INJECTION INTRAMUSCULAR; INTRAVENOUS EVERY 6 HOURS PRN
Status: DISCONTINUED | OUTPATIENT
Start: 2018-04-05 | End: 2018-04-05 | Stop reason: SDUPTHER

## 2018-04-05 RX ORDER — IBUPROFEN 800 MG/1
800 TABLET ORAL EVERY 8 HOURS PRN
Status: DISCONTINUED | OUTPATIENT
Start: 2018-04-05 | End: 2018-04-06

## 2018-04-05 RX ADMIN — HYDROMORPHONE HYDROCHLORIDE 100 MCG: 1 INJECTION, SOLUTION INTRAMUSCULAR; INTRAVENOUS; SUBCUTANEOUS at 12:12

## 2018-04-05 RX ADMIN — DEXTROAMPHETAMINE SACCHARATE, AMPHETAMINE ASPARTATE, DEXTROAMPHETAMINE SULFATE AND AMPHETAMINE SULFATE 30 MG: 7.5; 7.5; 7.5; 7.5 TABLET ORAL at 06:15

## 2018-04-05 RX ADMIN — FAMOTIDINE 20 MG: 10 INJECTION INTRAVENOUS at 11:41

## 2018-04-05 RX ADMIN — HYDROMORPHONE HYDROCHLORIDE 900 MCG: 1 INJECTION, SOLUTION INTRAMUSCULAR; INTRAVENOUS; SUBCUTANEOUS at 12:45

## 2018-04-05 RX ADMIN — ONDANSETRON HYDROCHLORIDE 4 MG: 2 SOLUTION INTRAMUSCULAR; INTRAVENOUS at 12:01

## 2018-04-05 RX ADMIN — PRENATAL VIT W/ FE FUMARATE-FA TAB 27-0.8 MG 1 TABLET: 27-0.8 TAB at 21:55

## 2018-04-05 RX ADMIN — NICOTINE 1 PATCH: 14 PATCH, EXTENDED RELEASE TRANSDERMAL at 17:50

## 2018-04-05 RX ADMIN — SODIUM CHLORIDE, POTASSIUM CHLORIDE, SODIUM LACTATE AND CALCIUM CHLORIDE 125 ML/HR: 600; 310; 30; 20 INJECTION, SOLUTION INTRAVENOUS at 01:18

## 2018-04-05 RX ADMIN — OXYTOCIN 30 UNITS: 10 INJECTION INTRAVENOUS at 12:34

## 2018-04-05 RX ADMIN — DEXTROAMPHETAMINE SACCHARATE, AMPHETAMINE ASPARTATE, DEXTROAMPHETAMINE SULFATE AND AMPHETAMINE SULFATE 30 MG: 7.5; 7.5; 7.5; 7.5 TABLET ORAL at 15:59

## 2018-04-05 RX ADMIN — BUPIVACAINE HYDROCHLORIDE 2 ML: 7.5 INJECTION, SOLUTION EPIDURAL; RETROBULBAR at 12:12

## 2018-04-05 RX ADMIN — DEXAMETHASONE SODIUM PHOSPHATE 4 MG: 4 INJECTION, SOLUTION INTRAMUSCULAR; INTRAVENOUS at 12:57

## 2018-04-05 RX ADMIN — METHADONE HYDROCHLORIDE 120 MG: 10 TABLET ORAL at 06:19

## 2018-04-05 RX ADMIN — SODIUM CITRATE AND CITRIC ACID MONOHYDRATE 30 ML: 500; 334 SOLUTION ORAL at 11:40

## 2018-04-05 RX ADMIN — OXYCODONE HYDROCHLORIDE AND ACETAMINOPHEN 2 TABLET: 7.5; 325 TABLET ORAL at 18:29

## 2018-04-05 RX ADMIN — Medication 2 G: at 12:13

## 2018-04-05 RX ADMIN — SODIUM CHLORIDE, POTASSIUM CHLORIDE, SODIUM LACTATE AND CALCIUM CHLORIDE: 600; 310; 30; 20 INJECTION, SOLUTION INTRAVENOUS at 12:33

## 2018-04-05 RX ADMIN — OXYCODONE HYDROCHLORIDE AND ACETAMINOPHEN 2 TABLET: 7.5; 325 TABLET ORAL at 14:27

## 2018-04-05 RX ADMIN — SODIUM CHLORIDE, POTASSIUM CHLORIDE, SODIUM LACTATE AND CALCIUM CHLORIDE 125 ML/HR: 600; 310; 30; 20 INJECTION, SOLUTION INTRAVENOUS at 10:00

## 2018-04-05 RX ADMIN — DEXAMETHASONE SODIUM PHOSPHATE 4 MG: 4 INJECTION, SOLUTION INTRAMUSCULAR; INTRAVENOUS at 12:01

## 2018-04-05 RX ADMIN — ONDANSETRON HYDROCHLORIDE 4 MG: 2 SOLUTION INTRAMUSCULAR; INTRAVENOUS at 12:57

## 2018-04-05 RX ADMIN — MAGNESIUM SULFATE HEPTAHYDRATE 2 G/HR: 40 INJECTION, SOLUTION INTRAVENOUS at 22:12

## 2018-04-05 RX ADMIN — OXYTOCIN 125 ML/HR: 10 INJECTION INTRAVENOUS at 17:46

## 2018-04-05 RX ADMIN — MAGNESIUM SULFATE HEPTAHYDRATE 16.2 G/HR: 40 INJECTION, SOLUTION INTRAVENOUS at 13:51

## 2018-04-05 RX ADMIN — IBUPROFEN 800 MG: 800 TABLET ORAL at 21:54

## 2018-04-05 RX ADMIN — DEXTROAMPHETAMINE SACCHARATE, AMPHETAMINE ASPARTATE, DEXTROAMPHETAMINE SULFATE AND AMPHETAMINE SULFATE 30 MG: 2.5; 2.5; 2.5; 2.5 TABLET ORAL at 21:55

## 2018-04-05 NOTE — ANESTHESIA PREPROCEDURE EVALUATION
Anesthesia Evaluation     NPO Solid Status: > 6 hours  NPO Liquid Status: > 6 hours           Airway   Mallampati: II  No difficulty expected  Dental - normal exam     Pulmonary - negative pulmonary ROS and normal exam   Cardiovascular - normal exam    (+) hypertension,       Neuro/Psych  (+) psychiatric history,     GI/Hepatic/Renal/Endo - negative ROS     Musculoskeletal     Abdominal  - normal exam   Substance History - negative use     OB/GYN    (+) Pregnant, pregnancy induced hypertension        Other - negative ROS                       Anesthesia Plan    ASA 2     spinal     Anesthetic plan and risks discussed with patient.

## 2018-04-05 NOTE — PROGRESS NOTES
Julia Dickey  : 1990  MRN: [id number]  CSN: [id number]    Labor progress note    Patient seen at 07:30 and again at 10:30.    Subjective   She reports is having no problems. No headache or visual changes.      Objective   Min/max vitals past 24 hours:  Temp  Min: 98.4 °F (36.9 °C)  Max: 98.6 °F (37 °C)   BP  Min: 113/71  Max: 149/95   Pulse  Min: 70  Max: 111   Resp  Min: 18  Max: 18        Lungs clear  No RUQ tenderness  DTR 1+ no clonus    FHT's: reassuring, minimal to moderate variability and category 2.  external monitors used   Cervix: was not checked.   Contractions: none       Repeat BPP  with AMALIA 1cm  24 hour urine positive for over 2500mg protein      Assessment   1. IUP at 35w3d  2. Severe pre-eclampsia with oligohydramnios  3. Non-reassuring fetal testing     Plan   1.    section  Plan discussed with family and questions answered.  Understanding verbalized.   Discussed non reassuring fetal testing in the setting of oligohydramnios and severe preeclampsia as indication for delivery. Also discussed risk of fetal intolerance of labor due to placental dysfunction. There was a deceleration overnight to the 80s for 7 minutes. There was just another decel to the 70's for 3 minutes. Discussed option of induction versus  with patient, who chooses  delivery. Anesthesia and NICU notified.    Risks, benefits, and alternatives to  section were discussed with the patient at  length.  The surgical nature of  section was discussed.  The indications for  section were discussed.  Risks of bleeding, infection, and damage to surrounding organs were reviewed.  Injury to blood vessels, the urinary bladder, the ureter, and the intestines were all reviewed.  Management of these complications were reviewed.    All of the patient's questions were answered to her satisfaction.  She verbalized understanding.  She wished to proceed.     Elias Singh,  MD  4/5/2018  11:31 AM

## 2018-04-05 NOTE — NURSING NOTE
DR WILDE NOTIFIED REGARDING AUDIBLE FETAL DECELERATION TO 70'S. REPORTS OF MINIMAL VARIABILITY. PHYSICIAN REVIEWING FETAL TRACING REMOTELY. DECISION TO ACCELERATE TIME FOR  SECTION TO 1200 PM MADE AT 1127 PM.

## 2018-04-05 NOTE — OP NOTE
[facility]  Lakeisha Dickey  : 1990  MRN: [id number]  [facility]: [id number]  Date: 4/3/2018 - 2018    Operative Note     SECTION PRIMARY      Pre-op Diagnosis:  35 weeks pregnant  Severe Preeclampsia  Oligohydramnios  Non reassuring fetal testing   Post-op Diagnosis:  Same   Procedure: Procedure(s):   SECTION PRIMARY   Surgeon: Surgeon(s):  Elias Singh MD       Anesthesia: Spinal     Estimated Blood Loss: 700   mls   Fluids: 1000   mls   UOP: 400   mls   Drains: Bain   ABx: Kefzol     Specimens:  Placenta, Cord Gasses   Findings: Normal uterus, tubes, and ovaries   Complications: none      INDICATION: Lakeisha Dickey is a 28 y.o. female with the diagnoses noted above with a nonreassuring fetal heart rate tracing.     PROCEDURE: After informed consent was obtained, the patient was taken to the operating room where spinal anesthesia was administered. Time out procedure was completed and perioperative antibiotics were administered. She was placed in the supine position with leftward tilt and her abdomen was prepped and draped in normal sterile fashion after a Bain catheter was placed by nursing staff.   After confirming adequate anesthesia, a Pfannenstiel incision was made with a scalpel 2 fingerbreadths above the pubic symphysis.  This was carried down sharply to the underlying fascia which was incised in the midline.  The fascial incision was extended laterally with Paula scissors.  The fascial edges were then elevated and the underlying rectus muscles dissected off with sharp technique.  The rectus muscles were sharply  in the midline and the underlying peritoneum identified and entered sharply.  The peritoneal incision was then extended and the bladder blade inserted.  The vesicouterine peritoneum was sharply incised with Metzenbaum scissors to create a bladder flap.  A low transverse uterine incision was made with a clean scalpel.  The uterine incision was bluntly extended  and amniotomy was performed returning clear fluid.  The head of the infant was delivered through the incision without difficulty and the remainder the infant delivered with fundal pressure.  The infant was vigorous on the field, the cord was clamped and cut, and the infant handed off to waiting nursery nurse.  Cord blood was obtained and the placenta then expressed.  The uterus was exteriorized and cleared of clot and debris with a moist laparotomy sponge.  The uterine incision was closed with a running locked suture of 0 Monocryl.  A second imbricating layer of 0 Monocryl was placed for reinforcement.  The uterine incision was hemostatic.  The cul-de-sac was then irrigated with normal saline and the uterus gently returned to the abdomen.  The uterine incision was reinspected with hemostasis noted.  The parietal peritoneum was then closed with a running suture of 2-0 Vicryl Rapide.  The subfascial spaces and rectus muscles were inspected with hemostasis noted.  The fascia was then closed with a running suture of 0 Vicryl.  The subcutaneous tissues were irrigated and made hemostatic with Bovie cautery.  The subcutaneous tissues were reapproximated with interrupted sutures of 2-0 plain gut.  The skin was closed with a subcuticular stitch of 3-0 Vicryl Rapide and reinforced with Steri-Strips.  A sterile dressing was then applied.    After expressing the uterus the patient was transitioned to the stretcher and taken to the recovery room in stable condition.  She tolerated the procedure well without complications.  All sponge, needle, and instrument counts were correct ×3 per the OR staff.    Elias Singh MD   4/5/2018  1:08 PM

## 2018-04-05 NOTE — PLAN OF CARE
Problem: Patient Care Overview  Goal: Plan of Care Review  Outcome: Ongoing (interventions implemented as appropriate)   18   Coping/Psychosocial   Plan of Care Reviewed With patient   OTHER   Outcome Summary remains in LDR, received Magnesium Sulfate 4gm loading dose, currently on Magnesium at 2gm and hour. Bonding well with infant. Abdominal dressing dry and intact.     Goal: Individualization and Mutuality   18   Individualization   Patient Specific Preferences formula feed and pacifiers   Patient Specific Goals (Include Timeframe) hold infant in recovery room   Patient Specific Interventions assist with skin to skin care   Mutuality/Individual Preferences   What Anxieties, Fears, Concerns, or Questions Do You Have About Your Care? infant's well being and withdrawal from drugs   How Would You and/or Your Support Person Like to Participate in Your Care? fob at bedside assisting with  care   Mutuality/Individual Preferences   How to Address Anxieties/Fears pt requesting to speak with NICU staff     Goal: Discharge Needs Assessment   18   Discharge Needs Assessment   Concerns to be Addressed no discharge needs identified     Goal: Interprofessional Rounds/Family Conf  Outcome: Ongoing (interventions implemented as appropriate)   18   Interdisciplinary Rounds/Family Conf   Summary plan of care explained to pt and significant other   Participants nursing

## 2018-04-05 NOTE — ANESTHESIA PROCEDURE NOTES
Intrathecal Block    Patient location during procedure: OB  Indication:at surgeon's request, post-op pain management, procedure for pain and labor analgesia   Performed By  CRNA: STEPHANIE VAELNTINO  Preanesthetic Checklist  Completed: patient identified, site marked, surgical consent, pre-op evaluation, timeout performed, IV checked, risks and benefits discussed and monitors and equipment checked  Intrathecal Block Prep:  Pt Position:sitting  Sterile Tech:cap, gloves, mask and sterile barrier  Prep:Betadine  Monitoring:blood pressure monitoring, continuous pulse oximetry and EKG  Intrathecal Block Procedure:  Approach:midline  Location:L4-L5  Needle Type:Pencan  Needle Gauge:25 G  Placement of Needle Event: cerebrospinal fluid  Fluid Appearance:clear  Post Assessment:  Patient Tolerance:patient tolerated the procedure well with no apparent complications  Complications:no

## 2018-04-05 NOTE — PLAN OF CARE
Problem:  Delivery (Adult,Obstetrics,Pediatric)  Intervention: Promote Fetal Wellbeing  Pt in OBRR   18 1443   Positioning   Body Position position maintained       Goal: Signs and Symptoms of Listed Potential Problems Will be Absent, Minimized or Managed ( Delivery)  Outcome: Outcome(s) achieved Date Met: 18  Delivered viable male via c/section   18 1443   Goal/Outcome Evaluation   Problems Assessed ( Delivery) all   Problems Present ( Delivery) none

## 2018-04-06 LAB
DEPRECATED RDW RBC AUTO: 45 FL (ref 40–54)
ERYTHROCYTE [DISTWIDTH] IN BLOOD BY AUTOMATED COUNT: 13.7 % (ref 12–15)
GIANT PLATELETS: ABNORMAL
HCT VFR BLD AUTO: 35.6 % (ref 37–47)
HGB BLD-MCNC: 12.7 G/DL (ref 12–16)
LYMPHOCYTES # BLD MANUAL: 3.77 10*3/MM3 (ref 0.8–7)
LYMPHOCYTES NFR BLD MANUAL: 17.3 % (ref 24–44)
LYMPHOCYTES NFR BLD MANUAL: 4.1 % (ref 0–12)
MCH RBC QN AUTO: 32.1 PG (ref 28–32)
MCHC RBC AUTO-ENTMCNC: 35.7 G/DL (ref 33–36)
MCV RBC AUTO: 89.9 FL (ref 82–98)
MONOCYTES # BLD AUTO: 0.89 10*3/MM3 (ref 0–1)
NEUTROPHILS # BLD AUTO: 16.66 10*3/MM3 (ref 1–11)
NEUTROPHILS NFR BLD MANUAL: 75.5 % (ref 37–80)
NEUTS BAND NFR BLD MANUAL: 1 % (ref 0–5)
PLATELET # BLD AUTO: 230 10*3/MM3 (ref 130–400)
PMV BLD AUTO: 11.1 FL (ref 6–12)
RBC # BLD AUTO: 3.96 10*6/MM3 (ref 4.2–5.4)
RBC MORPH BLD: NORMAL
SMUDGE CELLS BLD QL SMEAR: ABNORMAL
VARIANT LYMPHS NFR BLD MANUAL: 2 % (ref 0–5)
WBC NRBC COR # BLD: 21.77 10*3/MM3 (ref 4.8–10.8)

## 2018-04-06 PROCEDURE — 85007 BL SMEAR W/DIFF WBC COUNT: CPT | Performed by: OBSTETRICS & GYNECOLOGY

## 2018-04-06 PROCEDURE — 85025 COMPLETE CBC W/AUTO DIFF WBC: CPT | Performed by: OBSTETRICS & GYNECOLOGY

## 2018-04-06 PROCEDURE — 94799 UNLISTED PULMONARY SVC/PX: CPT

## 2018-04-06 RX ORDER — OXYCODONE AND ACETAMINOPHEN 7.5; 325 MG/1; MG/1
2 TABLET ORAL EVERY 4 HOURS PRN
Status: DISCONTINUED | OUTPATIENT
Start: 2018-04-06 | End: 2018-04-07 | Stop reason: HOSPADM

## 2018-04-06 RX ORDER — CALCIUM CARBONATE 200(500)MG
2 TABLET,CHEWABLE ORAL EVERY 6 HOURS PRN
Status: DISCONTINUED | OUTPATIENT
Start: 2018-04-06 | End: 2018-04-07 | Stop reason: HOSPADM

## 2018-04-06 RX ORDER — HYDROMORPHONE HCL 110MG/55ML
0.5 PATIENT CONTROLLED ANALGESIA SYRINGE INTRAVENOUS
Status: DISCONTINUED | OUTPATIENT
Start: 2018-04-06 | End: 2018-04-07 | Stop reason: HOSPADM

## 2018-04-06 RX ORDER — MISOPROSTOL 200 UG/1
600 TABLET ORAL ONCE AS NEEDED
Status: DISCONTINUED | OUTPATIENT
Start: 2018-04-06 | End: 2018-04-07 | Stop reason: HOSPADM

## 2018-04-06 RX ORDER — NALOXONE HCL 0.4 MG/ML
0.1 VIAL (ML) INJECTION
Status: DISCONTINUED | OUTPATIENT
Start: 2018-04-06 | End: 2018-04-07 | Stop reason: HOSPADM

## 2018-04-06 RX ORDER — OXYCODONE AND ACETAMINOPHEN 7.5; 325 MG/1; MG/1
1 TABLET ORAL EVERY 4 HOURS PRN
Status: DISCONTINUED | OUTPATIENT
Start: 2018-04-06 | End: 2018-04-07 | Stop reason: HOSPADM

## 2018-04-06 RX ORDER — DEXTROAMPHETAMINE SACCHARATE, AMPHETAMINE ASPARTATE, DEXTROAMPHETAMINE SULFATE AND AMPHETAMINE SULFATE 7.5; 7.5; 7.5; 7.5 MG/1; MG/1; MG/1; MG/1
30 TABLET ORAL 3 TIMES DAILY
Status: DISCONTINUED | OUTPATIENT
Start: 2018-04-06 | End: 2018-04-07 | Stop reason: HOSPADM

## 2018-04-06 RX ORDER — METHYLERGONOVINE MALEATE 0.2 MG/ML
200 INJECTION INTRAVENOUS ONCE AS NEEDED
Status: DISCONTINUED | OUTPATIENT
Start: 2018-04-06 | End: 2018-04-07 | Stop reason: HOSPADM

## 2018-04-06 RX ORDER — PRENATAL VIT/IRON FUM/FOLIC AC 27MG-0.8MG
1 TABLET ORAL DAILY
Status: DISCONTINUED | OUTPATIENT
Start: 2018-04-06 | End: 2018-04-07 | Stop reason: HOSPADM

## 2018-04-06 RX ORDER — IBUPROFEN 800 MG/1
800 TABLET ORAL EVERY 8 HOURS PRN
Status: DISCONTINUED | OUTPATIENT
Start: 2018-04-06 | End: 2018-04-07 | Stop reason: HOSPADM

## 2018-04-06 RX ORDER — DOCUSATE SODIUM 100 MG/1
100 CAPSULE, LIQUID FILLED ORAL 2 TIMES DAILY PRN
Status: DISCONTINUED | OUTPATIENT
Start: 2018-04-06 | End: 2018-04-07 | Stop reason: HOSPADM

## 2018-04-06 RX ORDER — ONDANSETRON 4 MG/1
4 TABLET, FILM COATED ORAL EVERY 8 HOURS PRN
Status: DISCONTINUED | OUTPATIENT
Start: 2018-04-06 | End: 2018-04-06 | Stop reason: SDUPTHER

## 2018-04-06 RX ORDER — ONDANSETRON 2 MG/ML
4 INJECTION INTRAMUSCULAR; INTRAVENOUS EVERY 6 HOURS PRN
Status: DISCONTINUED | OUTPATIENT
Start: 2018-04-06 | End: 2018-04-06 | Stop reason: SDUPTHER

## 2018-04-06 RX ORDER — DIPHENHYDRAMINE HCL 25 MG
25 CAPSULE ORAL EVERY 4 HOURS PRN
Status: DISCONTINUED | OUTPATIENT
Start: 2018-04-06 | End: 2018-04-07 | Stop reason: HOSPADM

## 2018-04-06 RX ORDER — SIMETHICONE 80 MG
80 TABLET,CHEWABLE ORAL 4 TIMES DAILY PRN
Status: DISCONTINUED | OUTPATIENT
Start: 2018-04-06 | End: 2018-04-07 | Stop reason: HOSPADM

## 2018-04-06 RX ORDER — SODIUM CHLORIDE, SODIUM LACTATE, POTASSIUM CHLORIDE, CALCIUM CHLORIDE 600; 310; 30; 20 MG/100ML; MG/100ML; MG/100ML; MG/100ML
125 INJECTION, SOLUTION INTRAVENOUS CONTINUOUS
Status: DISCONTINUED | OUTPATIENT
Start: 2018-04-06 | End: 2018-04-07 | Stop reason: HOSPADM

## 2018-04-06 RX ADMIN — OXYCODONE HYDROCHLORIDE AND ACETAMINOPHEN 2 TABLET: 7.5; 325 TABLET ORAL at 09:57

## 2018-04-06 RX ADMIN — IBUPROFEN 800 MG: 800 TABLET ORAL at 17:03

## 2018-04-06 RX ADMIN — OXYCODONE HYDROCHLORIDE AND ACETAMINOPHEN 2 TABLET: 7.5; 325 TABLET ORAL at 22:25

## 2018-04-06 RX ADMIN — METHADONE HYDROCHLORIDE 120 MG: 10 TABLET ORAL at 06:22

## 2018-04-06 RX ADMIN — SODIUM CHLORIDE, POTASSIUM CHLORIDE, SODIUM LACTATE AND CALCIUM CHLORIDE 75 ML/HR: 600; 310; 30; 20 INJECTION, SOLUTION INTRAVENOUS at 05:28

## 2018-04-06 RX ADMIN — OXYCODONE HYDROCHLORIDE AND ACETAMINOPHEN 2 TABLET: 7.5; 325 TABLET ORAL at 14:24

## 2018-04-06 RX ADMIN — OXYCODONE HYDROCHLORIDE AND ACETAMINOPHEN 2 TABLET: 7.5; 325 TABLET ORAL at 00:17

## 2018-04-06 RX ADMIN — DEXTROAMPHETAMINE SACCHARATE, AMPHETAMINE ASPARTATE, DEXTROAMPHETAMINE SULFATE AND AMPHETAMINE SULFATE 30 MG: 2.5; 2.5; 2.5; 2.5 TABLET ORAL at 14:26

## 2018-04-06 RX ADMIN — OXYCODONE HYDROCHLORIDE AND ACETAMINOPHEN 2 TABLET: 7.5; 325 TABLET ORAL at 05:18

## 2018-04-06 RX ADMIN — OXYCODONE HYDROCHLORIDE AND ACETAMINOPHEN 2 TABLET: 7.5; 325 TABLET ORAL at 18:12

## 2018-04-06 RX ADMIN — IBUPROFEN 800 MG: 800 TABLET ORAL at 09:03

## 2018-04-06 RX ADMIN — FAMOTIDINE 20 MG: 20 TABLET, FILM COATED ORAL at 08:58

## 2018-04-06 RX ADMIN — DEXTROAMPHETAMINE SACCHARATE, AMPHETAMINE ASPARTATE, DEXTROAMPHETAMINE SULFATE AND AMPHETAMINE SULFATE 30 MG: 2.5; 2.5; 2.5; 2.5 TABLET ORAL at 06:25

## 2018-04-06 RX ADMIN — MAGNESIUM SULFATE HEPTAHYDRATE 2 G/HR: 40 INJECTION, SOLUTION INTRAVENOUS at 09:00

## 2018-04-06 RX ADMIN — DEXTROAMPHETAMINE SACCHARATE, AMPHETAMINE ASPARTATE, DEXTROAMPHETAMINE SULFATE AND AMPHETAMINE SULFATE 30 MG: 7.5; 7.5; 7.5; 7.5 TABLET ORAL at 21:32

## 2018-04-06 NOTE — PLAN OF CARE
Problem: Patient Care Overview  Goal: Plan of Care Review   18 100   Coping/Psychosocial   Plan of Care Reviewed With patient   OTHER   Outcome Summary magnesium discontinued, reflexes wnl, no c/o vision problems, urine out wnl, abd dressing dry and intact, scd's continued     Goal: Individualization and Mutuality  Outcome: Ongoing (interventions implemented as appropriate)   18 1006   Individualization   Patient Specific Preferences formula feed   Patient Specific Goals (Include Timeframe) see infant in nicu   Patient Specific Interventions assist with position changes and daily care   Mutuality/Individual Preferences   What Anxieties, Fears, Concerns, or Questions Do You Have About Your Care? infant's well being   Mutuality/Individual Preferences   How to Address Anxieties/Fears pt going to nicu after magnesium discontinued     Goal: Discharge Needs Assessment   18 100   Discharge Needs Assessment   Concerns to be Addressed no discharge needs identified     Goal: Interprofessional Rounds/Family Conf   18   Interdisciplinary Rounds/Family Conf   Summary explained plan of care to pt and significant other   Participants family       Problem: Postpartum ( Delivery) (Adult,Obstetrics,Pediatric)  Goal: Signs and Symptoms of Listed Potential Problems Will be Absent, Minimized or Managed (Postpartum)  Outcome: Ongoing (interventions implemented as appropriate)   18 1006   Goal/Outcome Evaluation   Problems Assessed (Postpartum ) all   Problems Present (Postpartum ) none     Goal: Anesthesia/Sedation Recovery  Outcome: Ongoing (interventions implemented as appropriate)   18 1006   Goal/Outcome Evaluation   Anesthesia/Sedation Recovery criteria met for transfer       Problem: Hypertensive Disorders in Pregnancy (Adult,Obstetrics,Pediatric)  Goal: Signs and Symptoms of Listed Potential Problems Will be Absent, Minimized or Managed (Hypertensive Disorders in  Pregnancy)   04/06/18 1006   Goal/Outcome Evaluation   Problems Assessed (Hypertensive Disorders in Pregnancy) all   Problems Present (Hypertensive/in PG) none

## 2018-04-06 NOTE — PROGRESS NOTES
LADONNA [place]   PROGRESS NOTE    Post-Op Day 1 S/P   Subjective     Patient reports:  Pain is well controlled with ibuprofen (OTC) and narcotic analgesics including oxycodone/acetaminophen (Percocet, Tylox).  She is ambulating. Tolerating diet. flatus reported..  Vaginal bleeding is as much as expected.      Objective      Vitals: Vital Signs Range for the last 24 hours  Temperature: Temp:  [97.6 °F (36.4 °C)-98.6 °F (37 °C)] 97.6 °F (36.4 °C)   Temp Source: Temp src: Temporal Artery    BP: BP: (116-149)/() 131/82   Pulse: Heart Rate:  [] 99   Respirations: Resp:  [16-18] 18   SPO2: SpO2:  [93 %-100 %] 99 %   O2 Amount (l/min):     O2 Devices Device (Oxygen Therapy): room air   Weight:              Physical Exam    Lungs clear to auscultation bilaterally   Abdomen Soft, approp tender   Incision  Dressing c/d/i   Extremities edema 1+ and Homans sign is negative, no sign of DVT     I reviewed the patient's new clinical results.  Lab Results (last 24 hours)     Procedure Component Value Units Date/Time    CBC & Differential [[id number]] Collected:  18    Specimen:  Blood Updated:  18    Narrative:       The following orders were created for panel order CBC & Differential.  Procedure                               Abnormality         Status                     ---------                               -----------         ------                     Manual Differential[[id number]]          Abnormal            Final result               CBC Auto Differential[[id number]]        Abnormal            Final result                 Please view results for these tests on the individual orders.    Manual Differential [[id number]]  (Abnormal) Collected:  18    Specimen:  Blood Updated:  18     Neutrophil % 78.0 %      Lymphocyte % 17.0 %      Monocyte % 2.0 (L) %      Bands %  1.0 %      Atypical Lymphocyte % 2.0 %      Neutrophils Absolute 10.07 (H) 10*3/mm3       Lymphocytes Absolute 2.17 10*3/mm3      Monocytes Absolute 0.26 10*3/mm3      RBC Morphology Normal     WBC Morphology Normal     Platelet Estimate Adequate    CBC Auto Differential [[id number]]  (Abnormal) Collected:  04/05/18 0617    Specimen:  Blood Updated:  04/05/18 0802     WBC 12.75 (H) 10*3/mm3      RBC 3.78 (L) 10*6/mm3      Hemoglobin 11.7 (L) g/dL      Hematocrit 33.7 (L) %      MCV 89.2 fL      MCH 31.0 pg      MCHC 34.7 g/dL      RDW 13.8 %      RDW-SD 44.7 fl      MPV 11.3 fL      Platelets 180 10*3/mm3      nRBC 0.0 /100 WBC     Group B Streptococcus Culture - Swab, Vaginal/Rectum [[id number]]  (Abnormal) Collected:  04/03/18 1723    Specimen:  Swab from Vaginal/Rectum Updated:  04/05/18 0722     Group B Strep Culture --      Streptococcus, Beta Hemolytic, Group B (A)     Comment: This organism is considered to be universally susceptible to penicillin.  No further antibiotic testing will be performed.       Creatinine Clearance - Urine, Clean Catch [[id number]] Collected:  04/04/18 0615    Specimen:  24 Hour Urine from Urine, Clean Catch Updated:  04/05/18 0717    Narrative:       The following orders were created for panel order Creatinine Clearance - Urine, Clean Catch.  Procedure                               Abnormality         Status                     ---------                               -----------         ------                     Creatinine clearance serum[[id number]]   Normal              Final result               Creatinine Clearance, Ur...[[id number]]  Abnormal            Final result                 Please view results for these tests on the individual orders.    Creatinine Clearance, Urine, 24 Hour - Urine, Clean Catch [[id number]]  (Abnormal) Collected:  04/05/18 0617    Specimen:  24 Hour Urine from Urine, Clean Catch Updated:  04/05/18 0717     Creatinine Clearance 113.1 (H) ml/min      Creatinine, Urine 32.7 mg/dL      Time (Hours) 24 hrs      Creatinine, 24H 1.24 g/24 hr       CREATININE CLEARANCE L/24 HOUR 162.8 (H) L/24 hr     Protein, Urine, 24 Hour - Urine, Clean Catch [[id number]]  (Abnormal) Collected:  18    Specimen:  24 Hour Urine from Urine, Clean Catch Updated:  18     Protein, 24H Urine 2,463.5 (H) mg/24hours      Total Protein, Urine 65.0 (H) mg/dL      24H Urine Volume 3,790 mL      Time (Hours) 24 hrs     Comprehensive Metabolic Panel [[id number]]  (Abnormal) Collected:  18    Specimen:  Blood Updated:  18     Glucose 105 (H) mg/dL      BUN 10 mg/dL      Creatinine 0.60 mg/dL      Sodium 140 mmol/L      Potassium 3.8 mmol/L      Chloride 108 mmol/L      CO2 25.0 mmol/L      Calcium 8.4 mg/dL      Total Protein 5.6 (L) g/dL      Albumin 2.80 (L) g/dL      ALT (SGPT) 26 U/L      AST (SGOT) 21 U/L      Alkaline Phosphatase 210 (H) U/L      Total Bilirubin <0.1 (L) mg/dL      eGFR Non African Amer 118 mL/min/1.73      Globulin 2.8 gm/dL      A/G Ratio 1.0 (L) g/dL      BUN/Creatinine Ratio 16.7     Anion Gap 7.0 mmol/L     Uric Acid [[id number]]  (Normal) Collected:  18    Specimen:  Blood Updated:  18     Uric Acid 6.8 mg/dL           Assessment/Plan      Active Problems:    Oligohydramnios in third trimester    Pregnancy-induced hypertension in third trimester    Antepartum non-reassuring fetal heart rate or rhythm affecting care of mother    Pregnancy      Assessment:    Lakeisha Dickey is Day 1  post-partum  , Low Transverse    .       Plan:  remove dressing, remove urine catheter, advance diet  normal diet as tolerated, continue post op care and stop Magnesium and transfer to postpartum.        Elias Singh MD  2018  6:49 AM

## 2018-04-06 NOTE — PROGRESS NOTES
SW [place] a [place] call from [place] DCFS. SW has reported concerns and DCFS has advised that they will initiate an investigation based on potential risk of harm. A worker will be assigned and will initiate within 24 hours. With this being an [place] case, they will request a courtesy visit from [facility]. Due to varying time frame guidelines, [facility] is not sure when a state worker will be here to see mom and baby. [facility] would expect someone to be here before or no later than Monday. [facility] will continue to follow and update as information becomes available. RAUL Kate

## 2018-04-06 NOTE — ANESTHESIA POSTPROCEDURE EVALUATION
Patient: Lakeisha Dickey    Procedure Summary     Date:  18 Room / Location:  [facility] LABOR DELIVERY    Anesthesia Start:  1159 Anesthesia Stop:  7    Procedure:   SECTION PRIMARY (N/A Abdomen) Diagnosis:      Surgeon:  Elias Singh MD Provider:  Bo Dickerson CRNA    Anesthesia Type:  spinal ASA Status:  2          Anesthesia Type: spinal  Last vitals  BP   130/83 (18 1145)   Temp   97.2 °F (36.2 °C) (18 1145)   Pulse   98 (18 1145)   Resp   20 (18 1145)     SpO2   99 % (18 1145)     Post Anesthesia Care and Evaluation    Patient location during evaluation: PACU  Patient participation: complete - patient participated  Level of consciousness: awake and awake and alert  Pain score: 0  Pain management: adequate  Airway patency: patent  Anesthetic complications: No anesthetic complications    Cardiovascular status: acceptable and stable  Respiratory status: acceptable and unassisted  Hydration status: acceptable  Post Neuraxial Block status: Motor and sensory function returned to baseline and No signs or symptoms of PDPH

## 2018-04-06 NOTE — PROGRESS NOTES
SW has been consulted due to concerns regarding mother. She has a history of methadone use, schizoaffective disorder and ADHD for which she continues to take Adderall and Seroquel. Mother and baby have tested positive for Methadone and Amphetamines. Mother is prescribed Methadone, Adderall which would account for the positive drug screen for both the Methadone and Amphetamine. Baby's meconium is pending. [facility] has attempted to notify [place] DCF[place], but all intake workers are busy and not able to accept calls at this time. [facility] has left a message and requested a return call. [facility] will provide [place] DCF[place] with concerns regarding mothers mental health and history of substance abuse. It is not yet known whether or not DCFS will initiate an investigation. [facility] will follow and assist as needed. RAUL Kate

## 2018-04-07 VITALS
WEIGHT: 211 LBS | OXYGEN SATURATION: 97 % | SYSTOLIC BLOOD PRESSURE: 120 MMHG | RESPIRATION RATE: 20 BRPM | HEIGHT: 68 IN | BODY MASS INDEX: 31.98 KG/M2 | TEMPERATURE: 97.8 F | DIASTOLIC BLOOD PRESSURE: 86 MMHG | HEART RATE: 107 BPM

## 2018-04-07 PROCEDURE — 94799 UNLISTED PULMONARY SVC/PX: CPT

## 2018-04-07 RX ORDER — OXYCODONE AND ACETAMINOPHEN 10; 325 MG/1; MG/1
1 TABLET ORAL EVERY 6 HOURS PRN
Qty: 25 TABLET | Refills: 0 | Status: SHIPPED | OUTPATIENT
Start: 2018-04-07 | End: 2018-05-01

## 2018-04-07 RX ADMIN — DEXTROAMPHETAMINE SACCHARATE, AMPHETAMINE ASPARTATE, DEXTROAMPHETAMINE SULFATE AND AMPHETAMINE SULFATE 30 MG: 7.5; 7.5; 7.5; 7.5 TABLET ORAL at 09:33

## 2018-04-07 RX ADMIN — IBUPROFEN 800 MG: 800 TABLET ORAL at 01:31

## 2018-04-07 RX ADMIN — PRENATAL VIT W/ FE FUMARATE-FA TAB 27-0.8 MG 1 TABLET: 27-0.8 TAB at 09:33

## 2018-04-07 RX ADMIN — OXYCODONE HYDROCHLORIDE AND ACETAMINOPHEN 1 TABLET: 7.5; 325 TABLET ORAL at 09:33

## 2018-04-07 RX ADMIN — METHADONE HYDROCHLORIDE 120 MG: 10 TABLET ORAL at 06:05

## 2018-04-07 RX ADMIN — IBUPROFEN 800 MG: 800 TABLET ORAL at 09:33

## 2018-04-07 RX ADMIN — OXYCODONE HYDROCHLORIDE AND ACETAMINOPHEN 2 TABLET: 7.5; 325 TABLET ORAL at 14:04

## 2018-04-07 RX ADMIN — OXYCODONE HYDROCHLORIDE AND ACETAMINOPHEN 2 TABLET: 7.5; 325 TABLET ORAL at 02:43

## 2018-04-07 NOTE — PROGRESS NOTES
LADONNA [place]   PROGRESS NOTE    Post-Op Day 2 S/P   Subjective     Patient reports:  Pain is well controlled with ibuprofen (OTC) and narcotic analgesics including oxycodone/acetaminophen (Percocet, Tylox).  She is ambulating. Tolerating diet. Voiding - without difficulty; flatus reported..  Vaginal bleeding is as much as expected.      Objective      Vitals: Vital Signs Range for the last 24 hours  Temperature: Temp:  [97.2 °F (36.2 °C)-98 °F (36.7 °C)] 97.8 °F (36.6 °C)   Temp Source: Temp src: Temporal Artery    BP: BP: (120-145)/(76-86) 120/86   Pulse: Heart Rate:  [] 107   Respirations: Resp:  [18-20] 20   SPO2: SpO2:  [97 %-100 %] 97 %   O2 Amount (l/min):     O2 Devices Device (Oxygen Therapy): room air   Weight:              Physical Exam    Lungs clear to auscultation bilaterally   Abdomen Soft, approp tender   Incision  healing well, no erythema, no swelling, well approximated   Extremities edema trace and Homans sign is negative, no sign of DVT     None  Lab Results (last 24 hours)     Procedure Component Value Units Date/Time    CBC & Differential [[id number]] Collected:  18    Specimen:  Blood Updated:  18    Narrative:       The following orders were created for panel order CBC & Differential.  Procedure                               Abnormality         Status                     ---------                               -----------         ------                     Manual Differential[[id number]]          Abnormal            Final result               CBC Auto Differential[[id number]]        Abnormal            Final result                 Please view results for these tests on the individual orders.    CBC Auto Differential [[id number]]  (Abnormal) Collected:  18    Specimen:  Blood Updated:  18     WBC 21.77 (H) 10*3/mm3      RBC 3.96 (L) 10*6/mm3      Hemoglobin 12.7 g/dL      Hematocrit 35.6 (L) %      MCV 89.9 fL      MCH 32.1 (H) pg       MCHC 35.7 g/dL      RDW 13.7 %      RDW-SD 45.0 fl      MPV 11.1 fL      Platelets 230 10*3/mm3     Manual Differential [[id number]]  (Abnormal) Collected:  18 1137    Specimen:  Blood Updated:  18 1224     Neutrophil % 75.5 %      Lymphocyte % 17.3 (L) %      Monocyte % 4.1 %      Bands %  1.0 %      Atypical Lymphocyte % 2.0 %      Neutrophils Absolute 16.66 (H) 10*3/mm3      Lymphocytes Absolute 3.77 10*3/mm3      Monocytes Absolute 0.89 10*3/mm3      RBC Morphology Normal     Smudge Cells Mod/2+     Giant Platelets Slight/1+          Assessment/Plan      Active Problems:    Oligohydramnios in third trimester    Pregnancy-induced hypertension in third trimester    Antepartum non-reassuring fetal heart rate or rhythm affecting care of mother    Pregnancy      Assessment:    Lakeisha Dickey is Day 2  post-partum  , Low Transverse    .       Plan:  plan for discharge today.        Elias Singh MD  2018  10:19 AM

## 2018-04-07 NOTE — PLAN OF CARE
Problem: Patient Care Overview  Goal: Plan of Care Review  Outcome: Ongoing (interventions implemented as appropriate)   18 0600   Coping/Psychosocial   Plan of Care Reviewed With patient   Plan of Care Review   Progress improving     Goal: Individualization and Mutuality  Outcome: Ongoing (interventions implemented as appropriate)    Goal: Discharge Needs Assessment  Outcome: Ongoing (interventions implemented as appropriate)    Goal: Interprofessional Rounds/Family Conf  Outcome: Ongoing (interventions implemented as appropriate)      Problem: Postpartum ( Delivery) (Adult,Obstetrics,Pediatric)  Goal: Signs and Symptoms of Listed Potential Problems Will be Absent, Minimized or Managed (Postpartum)  Outcome: Ongoing (interventions implemented as appropriate)    Goal: Anesthesia/Sedation Recovery  Outcome: Ongoing (interventions implemented as appropriate)      Problem: Hypertensive Disorders in Pregnancy (Adult,Obstetrics,Pediatric)  Goal: Signs and Symptoms of Listed Potential Problems Will be Absent, Minimized or Managed (Hypertensive Disorders in Pregnancy)  Outcome: Ongoing (interventions implemented as appropriate)

## 2018-04-07 NOTE — NURSING NOTE
1450  Discharged after teaching done voiced understanding and adderall returned pt verifies correct amt

## 2018-04-07 NOTE — NURSING NOTE
0933  Pain med percocet given one 7.5 mg explained will be discharged with pain med percocet 7.5 mg every 6 hours explained to pt if this does not control pain let myself know voiced understanding

## 2018-04-07 NOTE — LACTATION NOTE
Spoke with parents briefly while nurse reviewing mother's discharge papers. Lakeisha has handouts left yesterday and states she will read them and let Lactation know if she has questions.

## 2018-04-07 NOTE — DISCHARGE SUMMARY
Discharge Summary    [facility]  Lakeisha Dickey  : 1990  MRN: [id number]  CSN: [id number]    Date of Admission: 4/3/2018   Date of Discharge:  2018   Delivering Physician: Elias Singh MD       Admission Diagnosis: 1. Antepartum non-reassuring fetal heart rate or rhythm affecting care of mother [O36.8390]  2. Pregnancy [Z34.90]   Discharge Diagnosis: 1. Pregnancy at 35w3d - delivered       Procedures: 1. Primary  (LTCS)     Hospital Course  See the completed operative report for details regarding antepartum course and delivery.    Her post-operative course was unremarkable.  On POD # 2 she felt like she ready ready for D/C.  She was evaluated by Dr. Singh who agreed she was able to be discharged to home.  She had no febrile morbidity. She had normal bowel and bladder function and was hemodynamically stable.  Her wound was healing well without obvious signs of infections.    Infant  male  fetus weighing 2190 g (4 lb 13.3 oz)   Apgars -  9  @ 1 minute /  9  @ 5 minutes.    Discharge labs  Lab Results   Component Value Date    WBC 21.77 (H) 2018    HGB 12.7 2018    HCT 35.6 (L) 2018     2018       Discharge Medications   Lakeisha Dickey   Home Medication Instructions CYRUS:[id number]    Printed on:18 1022   Medication Information                      amphetamine-dextroamphetamine (ADDERALL) 30 MG tablet  Take 1 tablet by mouth 3 (Three) Times a Day.             METHADONE HCL PO  120 mg             ondansetron (ZOFRAN) 4 MG tablet  Take 1 tablet by mouth Every 8 (Eight) Hours As Needed for Nausea or Vomiting.             oxyCODONE-acetaminophen (PERCOCET)  MG per tablet  Take 1 tablet by mouth Every 6 (Six) Hours As Needed for Moderate Pain .             Prenatal Vit-Fe Fumarate-FA (PRENATAL VITAMIN 27-0.8) 27-0.8 MG tablet tablet  Take 1 tablet by mouth Daily.             QUEtiapine (SEROQUEL) 100 MG tablet  Take 1 tablet by mouth Every Night.                  Discharge Disposition Home or Self Care   Condition on Discharge: good   Follow-up: 2 weeks with Samantha Singh MD  4/7/2018

## 2018-04-09 LAB
CYTO UR: NORMAL
LAB AP CASE REPORT: NORMAL
LAB AP CLINICAL INFORMATION: NORMAL
Lab: NORMAL
PATH REPORT.FINAL DX SPEC: NORMAL
PATH REPORT.GROSS SPEC: NORMAL

## 2018-04-10 NOTE — PAYOR COMM NOTE
"PA home 18    [phone]   PHONE    [phone]  Lakeisha Dickey (28 y.o. Female)     Date of Birth Social Security Number Address Home Phone MRN    1990  [address] [facility] [id number] [phone] [id number]    Worship Marital Status          Other Single       Admission Date Admission Type Admitting Provider Attending Provider Department, Room/Bed    4/3/18 Urgent Elias Singh MD  [facility] MOTHER BABY 2A, M242/1    Discharge Date Discharge Disposition Discharge Destination        2018 Home or Self Care              Attending Provider:  (none)   Allergies:  Bee Venom, Venomil Wasp Venom [Wasp Venom]    Isolation:  None   Infection:  None   Code Status:  Prior    Ht:  172.7 cm (68\")   Wt:  95.7 kg (211 lb)    Admission Cmt:  None   Principal Problem:  Pregnancy-induced hypertension in third trimester [O13.3]                 Active Insurance as of 4/3/2018     Primary Coverage     Payor Plan Insurance Group Employer/Plan Group    MEDICAID OUT OF STATE MISC MEDICAID OUT OF STATE      Coverage Address Coverage Phone Number Effective From Effective To    PO  [phone] 2018     [place] [address]       Subscriber Name Subscriber Birth Date Member ID       LAKEISHA DICKEY 1990 [id number]                 Emergency Contacts      (Rel.) Home Phone Work Phone Mobile Phone    Tommy Dickey (Father) -- -- [phone]    Octavio Gagnon (Significant Other) -- -- [phone]               Operative/Procedure Notes (all)      Elias Singh MD at 2018 11:26 AM  Version 1 of 1         [facility]  Lakeisha Dickey  : 1990  MRN: [id number]  CSN: [id number]  Date: 4/3/2018 - 2018    Operative Note     SECTION PRIMARY      Pre-op Diagnosis:  35 weeks pregnant  Severe Preeclampsia  Oligohydramnios  Non reassuring fetal testing   Post-op Diagnosis:  Same   Procedure: Procedure(s):   SECTION " PRIMARY   Surgeon: Surgeon(s):  Elias Singh MD       Anesthesia: Spinal     Estimated Blood Loss: 700   mls   Fluids: 1000   mls   UOP: 400   mls   Drains: Bain   ABx: Kefzol     Specimens:  Placenta, Cord Gasses   Findings: Normal uterus, tubes, and ovaries   Complications: none      INDICATION: Lakeisha Dickey is a 28 y.o. female with the diagnoses noted above with a nonreassuring fetal heart rate tracing.     PROCEDURE: After informed consent was obtained, the patient was taken to the operating room where spinal anesthesia was administered. Time out procedure was completed and perioperative antibiotics were administered. She was placed in the supine position with leftward tilt and her abdomen was prepped and draped in normal sterile fashion after a Bain catheter was placed by nursing staff.   After confirming adequate anesthesia, a Pfannenstiel incision was made with a scalpel 2 fingerbreadths above the pubic symphysis.  This was carried down sharply to the underlying fascia which was incised in the midline.  The fascial incision was extended laterally with Paula scissors.  The fascial edges were then elevated and the underlying rectus muscles dissected off with sharp technique.  The rectus muscles were sharply  in the midline and the underlying peritoneum identified and entered sharply.  The peritoneal incision was then extended and the bladder blade inserted.  The vesicouterine peritoneum was sharply incised with Metzenbaum scissors to create a bladder flap.  A low transverse uterine incision was made with a clean scalpel.  The uterine incision was bluntly extended and amniotomy was performed returning clear fluid.  The head of the infant was delivered through the incision without difficulty and the remainder the infant delivered with fundal pressure.  The infant was vigorous on the field, the cord was clamped and cut, and the infant handed off to waiting nursery nurse.  Cord blood was obtained  and the placenta then expressed.  The uterus was exteriorized and cleared of clot and debris with a moist laparotomy sponge.  The uterine incision was closed with a running locked suture of 0 Monocryl.  A second imbricating layer of 0 Monocryl was placed for reinforcement.  The uterine incision was hemostatic.  The cul-de-sac was then irrigated with normal saline and the uterus gently returned to the abdomen.  The uterine incision was reinspected with hemostasis noted.  The parietal peritoneum was then closed with a running suture of 2-0 Vicryl Rapide.  The subfascial spaces and rectus muscles were inspected with hemostasis noted.  The fascia was then closed with a running suture of 0 Vicryl.  The subcutaneous tissues were irrigated and made hemostatic with Bovie cautery.  The subcutaneous tissues were reapproximated with interrupted sutures of 2-0 plain gut.  The skin was closed with a subcuticular stitch of 3-0 Vicryl Rapide and reinforced with Steri-Strips.  A sterile dressing was then applied.    After expressing the uterus the patient was transitioned to the stretcher and taken to the recovery room in stable condition.  She tolerated the procedure well without complications.  All sponge, needle, and instrument counts were correct ×3 per the OR staff.    Elias Singh MD   2018  1:08 PM     Electronically signed by Elias Singh MD at 2018  1:10 PM          Discharge Summary      Elias Singh MD at 2018 10:22 AM          Discharge Summary     Julia Dickey  : 1990  MRN: [id number]  CSN: [id number]    Date of Admission: 4/3/2018   Date of Discharge:  2018   Delivering Physician: Elias Singh MD       Admission Diagnosis: 1. Antepartum non-reassuring fetal heart rate or rhythm affecting care of mother [O36.8390]  2. Pregnancy [Z34.90]   Discharge Diagnosis: 1. Pregnancy at 35w3d - delivered       Procedures: 1. Primary  (LTCS)     Hospital  Course  See the completed operative report for details regarding antepartum course and delivery.    Her post-operative course was unremarkable.  On POD # 2 she felt like she ready ready for D/C.  She was evaluated by Dr. Singh who agreed she was able to be discharged to home.  She had no febrile morbidity. She had normal bowel and bladder function and was hemodynamically stable.  Her wound was healing well without obvious signs of infections.    Infant  male  fetus weighing 2190 g (4 lb 13.3 oz)   Apgars -  9  @ 1 minute /  9  @ 5 minutes.    Discharge labs  Lab Results   Component Value Date    WBC 21.77 (H) 04/06/2018    HGB 12.7 04/06/2018    HCT 35.6 (L) 04/06/2018     04/06/2018       Discharge Medications   Lakeisha Dickey   Home Medication Instructions CYRUS:[id number]    Printed on:04/07/18 1022   Medication Information                      amphetamine-dextroamphetamine (ADDERALL) 30 MG tablet  Take 1 tablet by mouth 3 (Three) Times a Day.             METHADONE HCL PO  120 mg             ondansetron (ZOFRAN) 4 MG tablet  Take 1 tablet by mouth Every 8 (Eight) Hours As Needed for Nausea or Vomiting.             oxyCODONE-acetaminophen (PERCOCET)  MG per tablet  Take 1 tablet by mouth Every 6 (Six) Hours As Needed for Moderate Pain .             Prenatal Vit-Fe Fumarate-FA (PRENATAL VITAMIN 27-0.8) 27-0.8 MG tablet tablet  Take 1 tablet by mouth Daily.             QUEtiapine (SEROQUEL) 100 MG tablet  Take 1 tablet by mouth Every Night.                 Discharge Disposition Home or Self Care   Condition on Discharge: good   Follow-up: 2 weeks with Samantha Singh MD  4/7/2018       Electronically signed by Elias Singh MD at 4/7/2018 10:23 AM

## 2018-05-01 ENCOUNTER — POSTPARTUM VISIT (OUTPATIENT)
Dept: OBSTETRICS AND GYNECOLOGY | Facility: CLINIC | Age: 28
End: 2018-05-01

## 2018-05-01 ENCOUNTER — TELEPHONE (OUTPATIENT)
Dept: FAMILY MEDICINE CLINIC | Facility: CLINIC | Age: 28
End: 2018-05-01

## 2018-05-01 VITALS
BODY MASS INDEX: 30.77 KG/M2 | DIASTOLIC BLOOD PRESSURE: 90 MMHG | HEIGHT: 68 IN | SYSTOLIC BLOOD PRESSURE: 132 MMHG | WEIGHT: 203 LBS

## 2018-05-01 DIAGNOSIS — F25.8 OTHER SCHIZOAFFECTIVE DISORDERS (HCC): ICD-10-CM

## 2018-05-01 PROBLEM — O36.8390 ANTEPARTUM NON-REASSURING FETAL HEART RATE OR RHYTHM AFFECTING CARE OF MOTHER: Status: RESOLVED | Noted: 2018-04-04 | Resolved: 2018-05-01

## 2018-05-01 PROBLEM — O13.3 PREGNANCY-INDUCED HYPERTENSION IN THIRD TRIMESTER: Status: RESOLVED | Noted: 2018-04-04 | Resolved: 2018-05-01

## 2018-05-01 PROBLEM — Z34.90 PREGNANCY: Status: RESOLVED | Noted: 2018-04-05 | Resolved: 2018-05-01

## 2018-05-01 PROBLEM — O41.03X0 OLIGOHYDRAMNIOS IN THIRD TRIMESTER: Status: RESOLVED | Noted: 2018-04-04 | Resolved: 2018-05-01

## 2018-05-01 PROCEDURE — 0503F POSTPARTUM CARE VISIT: CPT | Performed by: OBSTETRICS & GYNECOLOGY

## 2018-05-01 RX ORDER — DEXTROAMPHETAMINE SACCHARATE, AMPHETAMINE ASPARTATE, DEXTROAMPHETAMINE SULFATE AND AMPHETAMINE SULFATE 7.5; 7.5; 7.5; 7.5 MG/1; MG/1; MG/1; MG/1
30 TABLET ORAL 3 TIMES DAILY
Qty: 90 TABLET | Refills: 0 | Status: SHIPPED | OUTPATIENT
Start: 2018-05-01 | End: 2018-05-14 | Stop reason: ALTCHOICE

## 2018-05-01 RX ORDER — NICOTINE 21 MG/24HR
1 PATCH, TRANSDERMAL 24 HOURS TRANSDERMAL EVERY 24 HOURS
Qty: 30 PATCH | Refills: 0 | Status: SHIPPED | OUTPATIENT
Start: 2018-05-01 | End: 2018-08-02 | Stop reason: SDUPTHER

## 2018-05-01 NOTE — PROGRESS NOTES
"Lakeisha Dickey is here for a postpartum visit after a  4 weeks ago.  She has no signs of postpartum depression today.  She has not had a period since her delivery and is bottle-feeding her infant without difficulty.  Her last Pap smear was 1 year ago and normal by her report.  She has no history of cervical dysplasia.  She would like an IUD for contraception.    /90 (BP Location: Left arm, Patient Position: Sitting)   Ht 172.7 cm (68\")   Wt 92.1 kg (203 lb)   LMP 2017 (Approximate)   Breastfeeding? No   BMI 30.87 kg/m²    In general pleasant female no acute distress  Neck no thyromegaly  Breasts without erythema tenderness or masses  Abdomen soft and nontender, her incision is well healed  A Pap smear was not performed.     Assessment: Normal postpartum exam.    We have discussed current Pap smear screening guidelines. I have given her a handout on the Mirena IUD but she will need to have it placed in [place]. Lakeisha will return in 1 year or sooner if needed.   "

## 2018-05-14 ENCOUNTER — TELEPHONE (OUTPATIENT)
Dept: FAMILY MEDICINE CLINIC | Facility: CLINIC | Age: 28
End: 2018-05-14

## 2018-05-14 ENCOUNTER — OFFICE VISIT (OUTPATIENT)
Dept: FAMILY MEDICINE CLINIC | Facility: CLINIC | Age: 28
End: 2018-05-14

## 2018-05-14 VITALS
HEIGHT: 68 IN | HEART RATE: 104 BPM | DIASTOLIC BLOOD PRESSURE: 92 MMHG | SYSTOLIC BLOOD PRESSURE: 120 MMHG | OXYGEN SATURATION: 98 % | RESPIRATION RATE: 16 BRPM | BODY MASS INDEX: 29.86 KG/M2 | WEIGHT: 197 LBS

## 2018-05-14 DIAGNOSIS — F33.1 MODERATE EPISODE OF RECURRENT MAJOR DEPRESSIVE DISORDER (HCC): Primary | ICD-10-CM

## 2018-05-14 DIAGNOSIS — F41.9 ANXIETY: ICD-10-CM

## 2018-05-14 PROCEDURE — 99213 OFFICE O/P EST LOW 20 MIN: CPT | Performed by: FAMILY MEDICINE

## 2018-05-14 RX ORDER — DULOXETIN HYDROCHLORIDE 20 MG/1
20 CAPSULE, DELAYED RELEASE ORAL DAILY
Qty: 30 CAPSULE | Refills: 1 | Status: SHIPPED | OUTPATIENT
Start: 2018-05-14

## 2018-05-14 NOTE — PROGRESS NOTES
Subjective   Lakeisha Dickey is a 28 y.o. female.     Chief Complaint   Patient presents with   • Depression     anxiety & post partum depression        History of Present Illness     Lakeisha is noting to feel issues with depression and anxiety since the birth 6.5 weeks..she is not breast feeding..      Current Outpatient Prescriptions:   •  amphetamine-dextroamphetamine (ADDERALL) 30 MG tablet, Take 1 tablet by mouth 3 (Three) Times a Day., Disp: 90 tablet, Rfl: 0  •  METHADONE HCL PO, 100 mg, Disp: , Rfl:   •  nicotine (NICODERM CQ) 21 MG/24HR patch, Place 1 patch on the skin Daily. 21mg daily x 4 weeks, Disp: 30 patch, Rfl: 0  •  Prenatal Vit-Fe Fumarate-FA (PRENATAL VITAMIN 27-0.8) 27-0.8 MG tablet tablet, Take 1 tablet by mouth Daily., Disp: 30 tablet, Rfl: 11  •  QUEtiapine (SEROQUEL) 100 MG tablet, Take 1 tablet by mouth Every Night. (Patient taking differently: Take 25 mg by mouth Every Night.), Disp: 30 tablet, Rfl: 0  Allergies   Allergen Reactions   • Bee Venom Anaphylaxis     Pt states that she carries an Epipen    • Venomil Wasp Venom [Wasp Venom] Anaphylaxis     Carries an Epipen       Past Medical History:   Diagnosis Date   • ADHD    • Dx about 4-5 years ago    • Schizo affective schizophrenia    • Stadol use disorder, mild, in sustained remission, abuse     4 years and 3 years clean of meth use and 3 years and 2 months clean from opiods     Past Surgical History:   Procedure Laterality Date   •  SECTION N/A 2018    Procedure:  SECTION PRIMARY;  Surgeon: Elias Singh MD;  Location: [facility] LABOR DELIVERY;  Service: Obstetrics/Gynecology   • OVARIAN CYST REMOVAL Right    • WRIST SURGERY Left     to remove cyst        Review of Systems    Objective   Physical Exam    Assessment/Plan   Lakeisha was seen today for depression.    Diagnoses and all orders for this visit:    Moderate episode of recurrent major depressive disorder    Anxiety    Other orders  -     DULoxetine  (CYMBALTA) 20 MG capsule; Take 1 capsule by mouth Daily.                 No orders of the defined types were placed in this encounter.      Follow up: 4 week(s)

## 2018-05-23 RX ORDER — ARMODAFINIL 200 MG/1
200 TABLET ORAL DAILY
Qty: 30 TABLET | Refills: 0 | Status: SHIPPED | OUTPATIENT
Start: 2018-05-23 | End: 2018-06-21

## 2018-05-23 NOTE — TELEPHONE ENCOUNTER
Pt called luis alberto that she had talked to u a kathy getting a rx for nuvigil but never got the rx? [phone]

## 2018-05-25 ENCOUNTER — TELEPHONE (OUTPATIENT)
Dept: OBSTETRICS AND GYNECOLOGY | Facility: CLINIC | Age: 28
End: 2018-05-25

## 2018-05-25 RX ORDER — LEVONORGESTREL AND ETHINYL ESTRADIOL 0.1-0.02MG
1 KIT ORAL DAILY
Qty: 28 TABLET | Refills: 12 | Status: SHIPPED | OUTPATIENT
Start: 2018-05-25 | End: 2019-05-25

## 2018-05-25 NOTE — TELEPHONE ENCOUNTER
Postpartum pt seen 5/1/18 she was given information about IUD  And would have to get in [place]. She is asking if she can get an OCP sent in until she can get in [place].

## 2018-06-21 ENCOUNTER — OFFICE VISIT (OUTPATIENT)
Dept: FAMILY MEDICINE CLINIC | Facility: CLINIC | Age: 28
End: 2018-06-21

## 2018-06-21 VITALS
TEMPERATURE: 98.5 F | BODY MASS INDEX: 31.83 KG/M2 | RESPIRATION RATE: 16 BRPM | WEIGHT: 210 LBS | HEIGHT: 68 IN | DIASTOLIC BLOOD PRESSURE: 80 MMHG | SYSTOLIC BLOOD PRESSURE: 142 MMHG | HEART RATE: 101 BPM | OXYGEN SATURATION: 97 %

## 2018-06-21 DIAGNOSIS — F98.8 ATTENTION DEFICIT DISORDER (ADD) WITHOUT HYPERACTIVITY: Primary | ICD-10-CM

## 2018-06-21 DIAGNOSIS — F41.9 ANXIETY: ICD-10-CM

## 2018-06-21 PROCEDURE — 99213 OFFICE O/P EST LOW 20 MIN: CPT | Performed by: FAMILY MEDICINE

## 2018-06-21 RX ORDER — DEXTROAMPHETAMINE SACCHARATE, AMPHETAMINE ASPARTATE, DEXTROAMPHETAMINE SULFATE AND AMPHETAMINE SULFATE 7.5; 7.5; 7.5; 7.5 MG/1; MG/1; MG/1; MG/1
30 TABLET ORAL 3 TIMES DAILY
COMMUNITY

## 2018-06-21 RX ORDER — ATOMOXETINE 40 MG/1
40 CAPSULE ORAL DAILY
Qty: 30 CAPSULE | Refills: 0 | Status: SHIPPED | OUTPATIENT
Start: 2018-06-21

## 2018-06-21 NOTE — PROGRESS NOTES
Subjective   Lakeisha Dickey is a 28 y.o. female.     No chief complaint on file.       History of Present Illness     she wants to try something else for add besides adderall started by dr viramontes--she feels she is doing well with the cymbalta  For her depression symptoms    Current Outpatient Prescriptions:   •  amphetamine-dextroamphetamine (ADDERALL) 30 MG tablet, Take 30 mg by mouth 3 (Three) Times a Day., Disp: , Rfl:   •  atomoxetine (STRATTERA) 40 MG capsule, Take 1 capsule by mouth Daily., Disp: 30 capsule, Rfl: 0  •  DULoxetine (CYMBALTA) 20 MG capsule, Take 1 capsule by mouth Daily., Disp: 30 capsule, Rfl: 1  •  levonorgestrel-ethinyl estradiol (AVIANE,ALESSE,LESSINA) 0.1-20 MG-MCG per tablet, Take 1 tablet by mouth Daily., Disp: 28 tablet, Rfl: 12  •  METHADONE HCL PO, 100 mg, Disp: , Rfl:   •  nicotine (NICODERM CQ) 21 MG/24HR patch, Place 1 patch on the skin Daily. 21mg daily x 4 weeks, Disp: 30 patch, Rfl: 0  Allergies   Allergen Reactions   • Bee Venom Anaphylaxis     Pt states that she carries an Epipen    • Venomil Wasp Venom [Wasp Venom] Anaphylaxis     Carries an Epipen       Past Medical History:   Diagnosis Date   • ADHD    • Dx about 4-5 years ago    • Schizo affective schizophrenia    • Stadol use disorder, mild, in sustained remission, abuse     4 years and 3 years clean of meth use and 3 years and 2 months clean from opiods     Past Surgical History:   Procedure Laterality Date   •  SECTION N/A 2018    Procedure:  SECTION PRIMARY;  Surgeon: Elias Singh MD;  Location: [facility] LABOR DELIVERY;  Service: Obstetrics/Gynecology   • OVARIAN CYST REMOVAL Right    • WRIST SURGERY Left     to remove cyst        Review of Systems   Constitutional: Negative.    HENT: Negative.    Eyes: Negative.    Respiratory: Negative.    Cardiovascular: Negative.    Gastrointestinal: Negative.    Endocrine: Negative.    Genitourinary: Negative.    Musculoskeletal: Negative.    Skin:  "Negative.    Allergic/Immunologic: Negative.    Neurological: Negative.    Hematological: Negative.    Psychiatric/Behavioral: Positive for dysphoric mood. Negative for self-injury, sleep disturbance and suicidal ideas. The patient is nervous/anxious.        Objective  /80   Pulse 101   Temp 98.5 °F (36.9 °C)   Resp 16   Ht 172.7 cm (67.99\")   Wt 95.3 kg (210 lb)   SpO2 97%   BMI 31.94 kg/m²   Physical Exam   Constitutional: She is oriented to person, place, and time. She appears well-developed and well-nourished.   HENT:   Head: Normocephalic and atraumatic.   Eyes: EOM are normal. Pupils are equal, round, and reactive to light.   Neck: Neck supple.   Cardiovascular: Normal rate, regular rhythm, normal heart sounds and intact distal pulses.    Pulmonary/Chest: Effort normal and breath sounds normal.   Abdominal: Soft. Bowel sounds are normal.   Musculoskeletal: Normal range of motion.   Neurological: She is alert and oriented to person, place, and time.   Skin: Skin is warm. Capillary refill takes less than 2 seconds.   Psychiatric: She has a normal mood and affect. Her behavior is normal. Judgment and thought content normal.   Nursing note and vitals reviewed.      Assessment/Plan   Diagnoses and all orders for this visit:    Attention deficit disorder (ADD) without hyperactivity    Anxiety    Other orders  -     atomoxetine (STRATTERA) 40 MG capsule; Take 1 capsule by mouth Daily.      She is gettting ready to see psychiatry at [facility]  Lakeisha later came back and stated she could not afford the strattera and wanted to go back on the adderall--I told her I wanted to talk to her therapist before we changed back as it was my understanding the new psychiatrist does not use of like adderall (this was told me from another patient who she was told to discontinue the adderall)--so I wanted to talk with her therapist and then she told me she was \"in between\" therapists and wouldn't have one till next " "week--I told her I want to talk to her to get some assurances this would be continued by the new psychiatrist or it wouldn't make sense to change it --Lakeisha then asked \"why are you being hateful about this\"--I told her I wasn't being hateful and I was tryhing to help her but she left the office---SERGO Holman and TERRENCE Sterling were witnesses to this exchange.         No orders of the defined types were placed in this encounter.      Follow up: 6 month(s)  "

## 2018-07-09 ENCOUNTER — TELEPHONE (OUTPATIENT)
Dept: FAMILY MEDICINE CLINIC | Facility: CLINIC | Age: 28
End: 2018-07-09

## 2018-07-09 RX ORDER — DULOXETIN HYDROCHLORIDE 30 MG/1
30 CAPSULE, DELAYED RELEASE ORAL DAILY
Qty: 30 CAPSULE | Refills: 0 | Status: SHIPPED | OUTPATIENT
Start: 2018-07-09

## 2018-07-09 NOTE — TELEPHONE ENCOUNTER
May I call and talk to her counselor?--if I can call I will discuss how she is feeling?--why does she feel she needs to increase the cymbalta?

## 2018-07-09 NOTE — TELEPHONE ENCOUNTER
She said yes rianna  Can talk to her counsler but she said that she feels the cymbalta dose is not helping anymore she said that she thought when she talked to rianna that it was ok to increase if she felt it need be?

## 2018-07-09 NOTE — TELEPHONE ENCOUNTER
Pt called she is asking about increasing her cymbalta again she stated that she did not think she needed any counseling for this medication but she does do weekly counseling at methadone clinic ? [phone]

## 2018-07-09 NOTE — TELEPHONE ENCOUNTER
Is she feeling more depressed?--what dose is she taking now?--does her counselor know she feels this way?

## 2018-07-09 NOTE — TELEPHONE ENCOUNTER
Yes she feels more depressed she is on cymbalta 20mg and yes her counselor does know she feels this way she just spoke to them

## 2018-07-31 ENCOUNTER — TELEPHONE (OUTPATIENT)
Dept: FAMILY MEDICINE CLINIC | Facility: CLINIC | Age: 28
End: 2018-07-31

## 2018-07-31 NOTE — TELEPHONE ENCOUNTER
Pt called she asked if u will give her rx for the adderall until she can get into phychiatrist she siad that she is going to school again and can not focus? [phone]

## 2018-08-03 RX ORDER — NICOTINE 21 MG/24HR
PATCH, TRANSDERMAL 24 HOURS TRANSDERMAL
Qty: 28 PATCH | Refills: 0 | Status: SHIPPED | OUTPATIENT
Start: 2018-08-03 | End: 2020-11-02 | Stop reason: SDUPTHER

## 2018-09-13 RX ORDER — NICOTINE 21 MG/24HR
PATCH, TRANSDERMAL 24 HOURS TRANSDERMAL
OUTPATIENT
Start: 2018-09-13

## 2019-02-11 ENCOUNTER — TELEPHONE (OUTPATIENT)
Dept: FAMILY MEDICINE CLINIC | Facility: CLINIC | Age: 29
End: 2019-02-11

## 2019-02-11 NOTE — TELEPHONE ENCOUNTER
I dont prescribe this---but I suspect if she will check with her ob-gyn they can help her with this

## 2019-02-11 NOTE — TELEPHONE ENCOUNTER
She says she has not had to be seen for anything, but she says she is still a patient here until she can find someone else. Will pay cash if she needs to.

## 2019-06-18 RX ORDER — LEVONORGESTREL AND ETHINYL ESTRADIOL 0.1-0.02MG
KIT ORAL
Qty: 28 TABLET | OUTPATIENT
Start: 2019-06-18

## 2019-06-19 RX ORDER — LEVONORGESTREL AND ETHINYL ESTRADIOL 0.1-0.02MG
KIT ORAL
Qty: 28 TABLET | Refills: 0 | Status: SHIPPED | OUTPATIENT
Start: 2019-06-19

## 2019-06-19 NOTE — TELEPHONE ENCOUNTER
Refill request. Pts last office visit was 05/01/2018 for post partum post op. No further appts noted.

## 2020-07-21 ENCOUNTER — TELEPHONE (OUTPATIENT)
Dept: FAMILY MEDICINE CLINIC | Facility: CLINIC | Age: 30
End: 2020-07-21

## 2020-07-21 NOTE — TELEPHONE ENCOUNTER
PATIENT STATES THAT SHE IS A DANGLING CYST AND IS WANTING TO KNOW IF SHE CAN HAVE SOMETHING CALLED IN TO HELP WITH THE PAIN.

## 2020-07-21 NOTE — TELEPHONE ENCOUNTER
So I called back and pt stated to be honest she is going thru withdraws and she is really sick she said that she has been going to a methadone clinic and she got her meds lowered termendously and the withdraws are bad.

## 2020-07-21 NOTE — TELEPHONE ENCOUNTER
Lakeisha called requesting pain medication for a ganglionic cyst on her right wrist. She states it was hurting so badly she went to [facility] ED at 2am and that OTC medications aren't helping.    Her last appt was 6/21/2018.

## 2020-07-28 ENCOUNTER — TELEPHONE (OUTPATIENT)
Dept: FAMILY MEDICINE CLINIC | Facility: CLINIC | Age: 30
End: 2020-07-28

## 2020-07-28 NOTE — TELEPHONE ENCOUNTER
Patient called and stated that she is between physiatrists right now and she cannot get in to be seen currently.    She reports being out of the following medications and hoping to get them refilled with a quick Rx by Dr. Jorgensen if possible because she is doing bad right now:    METHADONE HCL PO    amphetamine-dextroamphetamine (ADDERALL) 30 MG tablet    Verified [facility].    Patient can be contacted best at [phone].

## 2020-07-28 NOTE — TELEPHONE ENCOUNTER
Pt is aware of this and was not happy she states now without this medication she will not be able to get up and care for her baby.

## 2020-09-29 ENCOUNTER — TELEPHONE (OUTPATIENT)
Dept: FAMILY MEDICINE CLINIC | Facility: CLINIC | Age: 30
End: 2020-09-29

## 2020-09-29 RX ORDER — NICOTINE 21 MG/24HR
1 PATCH, TRANSDERMAL 24 HOURS TRANSDERMAL EVERY 24 HOURS
Qty: 30 PATCH | Refills: 0 | Status: SHIPPED | OUTPATIENT
Start: 2020-09-29

## 2020-09-29 NOTE — TELEPHONE ENCOUNTER
PATIENT CALLING IN WANTING TO KNOW IF SHE COULD GET A PRESCRIPTION FOR NICOTINE PATCHES.  PATIENT IS TRYING TO STOP SMOKING.    CALL BACK NUMBER:  [phone]    CONFIRMED PHARMACY: [facility] - [address] [phone] [place] [phone]   [phone]

## 2020-10-05 ENCOUNTER — TELEPHONE (OUTPATIENT)
Dept: FAMILY MEDICINE CLINIC | Facility: CLINIC | Age: 30
End: 2020-10-05

## 2020-10-05 NOTE — TELEPHONE ENCOUNTER
PATIENT CALLING IN STATING THAT HER HIP HAS BEEN GRINDING AGAIN AND IT IT CAUSING LOTS OF PAIN AND DISCOMFORT.     CALL BACK NUMBER: [phone]     CONFIRMED PHARMACY: [facility] - [place] [address] [facility] [phone] [facility] [phone]   [phone]

## 2020-10-06 NOTE — TELEPHONE ENCOUNTER
I put her in for param for tomorrow.but she did asked if u will give her some pain meds till then?i told her if u ok that then I would call her back but if not keep appt tomorrow

## 2020-11-02 RX ORDER — NICOTINE 21 MG/24HR
1 PATCH, TRANSDERMAL 24 HOURS TRANSDERMAL EVERY 24 HOURS
Qty: 28 PATCH | Refills: 0 | Status: SHIPPED | OUTPATIENT
Start: 2020-11-02

## 2020-11-02 NOTE — TELEPHONE ENCOUNTER
Caller: Lakeisha Dickey    Relationship: Self    Best call back number: [phone]    Medication needed:   Requested Prescriptions     Pending Prescriptions Disp Refills   • nicotine (NICODERM CQ) 21 MG/24HR patch 28 patch 0       When do you need the refill by:   ASAP    What details did the patient provide when requesting the medication:   Patient called and wanted to fill 21 MG patches.    Does the patient have less than a 3 day supply:  [] Yes  [x] No    What is the patient's preferred pharmacy: [id number] - [place], [place] - [facility] [address] [place] [address] AT [facility] [address] [facility] [phone] [facility] [phone] FX

## 2020-11-03 ENCOUNTER — HOSPITAL ENCOUNTER (INPATIENT)
Age: 30
LOS: 4 days | Discharge: PSYCHIATRIC OR SUBSTANCE USE FACILITY | DRG: 817 | End: 2020-11-07
Attending: PEDIATRICS | Admitting: HOSPITALIST
Payer: MEDICAID

## 2020-11-03 ENCOUNTER — APPOINTMENT (OUTPATIENT)
Dept: GENERAL RADIOLOGY | Age: 30
DRG: 817 | End: 2020-11-03
Payer: MEDICAID

## 2020-11-03 ENCOUNTER — APPOINTMENT (OUTPATIENT)
Dept: CT IMAGING | Age: 30
DRG: 817 | End: 2020-11-03
Payer: MEDICAID

## 2020-11-03 PROBLEM — R56.9 SEIZURE (HCC): Status: ACTIVE | Noted: 2020-11-03

## 2020-11-03 LAB
ACETAMINOPHEN LEVEL: <15 UG/ML
ALBUMIN SERPL-MCNC: 4.4 G/DL (ref 3.5–5.2)
ALP BLD-CCNC: 66 U/L (ref 35–104)
ALT SERPL-CCNC: 14 U/L (ref 5–33)
AMPHETAMINE SCREEN, URINE: POSITIVE
ANION GAP SERPL CALCULATED.3IONS-SCNC: 19 MMOL/L (ref 7–19)
AST SERPL-CCNC: 12 U/L (ref 5–32)
BARBITURATE SCREEN URINE: NEGATIVE
BASE EXCESS ARTERIAL: -0.9 MMOL/L (ref -2–2)
BASOPHILS ABSOLUTE: 0.1 K/UL (ref 0–0.2)
BASOPHILS RELATIVE PERCENT: 0.4 % (ref 0–1)
BENZODIAZEPINE SCREEN, URINE: NEGATIVE
BILIRUB SERPL-MCNC: <0.2 MG/DL (ref 0.2–1.2)
BILIRUBIN URINE: NEGATIVE
BLOOD, URINE: NEGATIVE
BUN BLDV-MCNC: 8 MG/DL (ref 6–20)
CALCIUM SERPL-MCNC: 8.7 MG/DL (ref 8.6–10)
CANNABINOID SCREEN URINE: NEGATIVE
CARBOXYHEMOGLOBIN ARTERIAL: 2.9 % (ref 0–5)
CHLORIDE BLD-SCNC: 104 MMOL/L (ref 98–111)
CLARITY: CLEAR
CO2: 19 MMOL/L (ref 22–29)
COCAINE METABOLITE SCREEN URINE: NEGATIVE
COLOR: YELLOW
CREAT SERPL-MCNC: 1 MG/DL (ref 0.5–0.9)
EOSINOPHILS ABSOLUTE: 0.1 K/UL (ref 0–0.6)
EOSINOPHILS RELATIVE PERCENT: 0.3 % (ref 0–5)
ETHANOL: <10 MG/DL (ref 0–0.08)
GFR AFRICAN AMERICAN: >59
GFR NON-AFRICAN AMERICAN: >60
GLUCOSE BLD-MCNC: 133 MG/DL (ref 70–99)
GLUCOSE BLD-MCNC: 152 MG/DL (ref 74–109)
GLUCOSE URINE: NEGATIVE MG/DL
HCO3 ARTERIAL: 23.5 MMOL/L (ref 22–26)
HCT VFR BLD CALC: 36.4 % (ref 37–47)
HEMOGLOBIN, ART, EXTENDED: 11.2 G/DL (ref 12–16)
HEMOGLOBIN: 12.1 G/DL (ref 12–16)
IMMATURE GRANULOCYTES #: 0.2 K/UL
KETONES, URINE: ABNORMAL MG/DL
LACTIC ACID: 8.1 MMOL/L (ref 0.5–1.9)
LEUKOCYTE ESTERASE, URINE: NEGATIVE
LYMPHOCYTES ABSOLUTE: 2.6 K/UL (ref 1.1–4.5)
LYMPHOCYTES RELATIVE PERCENT: 9.7 % (ref 20–40)
Lab: ABNORMAL
MCH RBC QN AUTO: 30.6 PG (ref 27–31)
MCHC RBC AUTO-ENTMCNC: 33.2 G/DL (ref 33–37)
MCV RBC AUTO: 91.9 FL (ref 81–99)
METHEMOGLOBIN ARTERIAL: 1.6 %
MONOCYTES ABSOLUTE: 1 K/UL (ref 0–0.9)
MONOCYTES RELATIVE PERCENT: 3.8 % (ref 0–10)
NEUTROPHILS ABSOLUTE: 23 K/UL (ref 1.5–7.5)
NEUTROPHILS RELATIVE PERCENT: 84.9 % (ref 50–65)
NITRITE, URINE: NEGATIVE
O2 CONTENT ARTERIAL: 15.8 ML/DL
O2 SAT, ARTERIAL: 94.9 %
O2 THERAPY: ABNORMAL
OPIATE SCREEN URINE: NEGATIVE
PCO2 ARTERIAL: 37 MMHG (ref 35–45)
PDW BLD-RTO: 13.6 % (ref 11.5–14.5)
PERFORMED ON: ABNORMAL
PH ARTERIAL: 7.41 (ref 7.35–7.45)
PH UA: 5.5 (ref 5–8)
PLATELET # BLD: 329 K/UL (ref 130–400)
PMV BLD AUTO: 9.7 FL (ref 9.4–12.3)
PO2 ARTERIAL: 331 MMHG (ref 80–100)
POTASSIUM REFLEX MAGNESIUM: 3.7 MMOL/L (ref 3.5–5)
POTASSIUM, WHOLE BLOOD: 3.3
PROTEIN UA: ABNORMAL MG/DL
RBC # BLD: 3.96 M/UL (ref 4.2–5.4)
SALICYLATE, SERUM: <3 MG/DL (ref 3–10)
SARS-COV-2, NAAT: NOT DETECTED
SODIUM BLD-SCNC: 142 MMOL/L (ref 136–145)
SPECIFIC GRAVITY UA: 1.02 (ref 1–1.03)
TOTAL PROTEIN: 7.3 G/DL (ref 6.6–8.7)
TROPONIN: <0.01 NG/ML (ref 0–0.03)
UROBILINOGEN, URINE: 1 E.U./DL
WBC # BLD: 27.1 K/UL (ref 4.8–10.8)

## 2020-11-03 PROCEDURE — 82550 ASSAY OF CK (CPK): CPT

## 2020-11-03 PROCEDURE — 2580000003 HC RX 258: Performed by: HOSPITALIST

## 2020-11-03 PROCEDURE — 31500 INSERT EMERGENCY AIRWAY: CPT

## 2020-11-03 PROCEDURE — 84484 ASSAY OF TROPONIN QUANT: CPT

## 2020-11-03 PROCEDURE — 82947 ASSAY GLUCOSE BLOOD QUANT: CPT

## 2020-11-03 PROCEDURE — 36600 WITHDRAWAL OF ARTERIAL BLOOD: CPT

## 2020-11-03 PROCEDURE — 82803 BLOOD GASES ANY COMBINATION: CPT

## 2020-11-03 PROCEDURE — 71045 X-RAY EXAM CHEST 1 VIEW: CPT

## 2020-11-03 PROCEDURE — 80307 DRUG TEST PRSMV CHEM ANLYZR: CPT

## 2020-11-03 PROCEDURE — 83735 ASSAY OF MAGNESIUM: CPT

## 2020-11-03 PROCEDURE — 83605 ASSAY OF LACTIC ACID: CPT

## 2020-11-03 PROCEDURE — 96361 HYDRATE IV INFUSION ADD-ON: CPT

## 2020-11-03 PROCEDURE — 96360 HYDRATION IV INFUSION INIT: CPT

## 2020-11-03 PROCEDURE — 84132 ASSAY OF SERUM POTASSIUM: CPT

## 2020-11-03 PROCEDURE — G0480 DRUG TEST DEF 1-7 CLASSES: HCPCS

## 2020-11-03 PROCEDURE — 81003 URINALYSIS AUTO W/O SCOPE: CPT

## 2020-11-03 PROCEDURE — 99999 PR OFFICE/OUTPT VISIT,PROCEDURE ONLY: CPT | Performed by: PEDIATRICS

## 2020-11-03 PROCEDURE — 6360000002 HC RX W HCPCS: Performed by: PEDIATRICS

## 2020-11-03 PROCEDURE — 6360000002 HC RX W HCPCS

## 2020-11-03 PROCEDURE — 2000000000 HC ICU R&B

## 2020-11-03 PROCEDURE — 80053 COMPREHEN METABOLIC PANEL: CPT

## 2020-11-03 PROCEDURE — 70450 CT HEAD/BRAIN W/O DYE: CPT

## 2020-11-03 PROCEDURE — 2580000003 HC RX 258: Performed by: PEDIATRICS

## 2020-11-03 PROCEDURE — 36415 COLL VENOUS BLD VENIPUNCTURE: CPT

## 2020-11-03 PROCEDURE — 85025 COMPLETE CBC W/AUTO DIFF WBC: CPT

## 2020-11-03 PROCEDURE — U0002 COVID-19 LAB TEST NON-CDC: HCPCS

## 2020-11-03 PROCEDURE — 93005 ELECTROCARDIOGRAM TRACING: CPT | Performed by: PEDIATRICS

## 2020-11-03 PROCEDURE — 99285 EMERGENCY DEPT VISIT HI MDM: CPT

## 2020-11-03 RX ORDER — PROPOFOL 10 MG/ML
10 INJECTION, EMULSION INTRAVENOUS ONCE
Status: COMPLETED | OUTPATIENT
Start: 2020-11-03 | End: 2020-11-03

## 2020-11-03 RX ORDER — PANTOPRAZOLE SODIUM 40 MG/10ML
80 INJECTION, POWDER, LYOPHILIZED, FOR SOLUTION INTRAVENOUS ONCE
Status: COMPLETED | OUTPATIENT
Start: 2020-11-03 | End: 2020-11-04

## 2020-11-03 RX ORDER — PROPOFOL 10 MG/ML
INJECTION, EMULSION INTRAVENOUS
Status: COMPLETED
Start: 2020-11-03 | End: 2020-11-03

## 2020-11-03 RX ORDER — 0.9 % SODIUM CHLORIDE 0.9 %
1000 INTRAVENOUS SOLUTION INTRAVENOUS ONCE
Status: COMPLETED | OUTPATIENT
Start: 2020-11-03 | End: 2020-11-04

## 2020-11-03 RX ORDER — MIDAZOLAM HYDROCHLORIDE 1 MG/ML
4 INJECTION INTRAMUSCULAR; INTRAVENOUS ONCE
Status: COMPLETED | OUTPATIENT
Start: 2020-11-03 | End: 2020-11-03

## 2020-11-03 RX ORDER — MIDAZOLAM HYDROCHLORIDE 1 MG/ML
5 INJECTION INTRAMUSCULAR; INTRAVENOUS ONCE
Status: COMPLETED | OUTPATIENT
Start: 2020-11-03 | End: 2020-11-03

## 2020-11-03 RX ORDER — PROPOFOL 10 MG/ML
INJECTION, EMULSION INTRAVENOUS
Status: DISPENSED
Start: 2020-11-03 | End: 2020-11-04

## 2020-11-03 RX ORDER — MIDAZOLAM HYDROCHLORIDE 1 MG/ML
INJECTION INTRAMUSCULAR; INTRAVENOUS
Status: COMPLETED
Start: 2020-11-03 | End: 2020-11-03

## 2020-11-03 RX ORDER — SUCCINYLCHOLINE CHLORIDE 20 MG/ML
100 INJECTION INTRAMUSCULAR; INTRAVENOUS ONCE
Status: COMPLETED | OUTPATIENT
Start: 2020-11-03 | End: 2020-11-03

## 2020-11-03 RX ADMIN — MIDAZOLAM 4 MG: 1 INJECTION INTRAMUSCULAR; INTRAVENOUS at 21:19

## 2020-11-03 RX ADMIN — PROPOFOL 5 MCG/KG/MIN: 10 INJECTION, EMULSION INTRAVENOUS at 20:36

## 2020-11-03 RX ADMIN — SODIUM CHLORIDE 1000 ML: 9 INJECTION, SOLUTION INTRAVENOUS at 22:10

## 2020-11-03 RX ADMIN — MIDAZOLAM HYDROCHLORIDE 4 MG: 1 INJECTION INTRAMUSCULAR; INTRAVENOUS at 21:19

## 2020-11-03 RX ADMIN — MIDAZOLAM HYDROCHLORIDE 1 MG/HR: 5 INJECTION, SOLUTION INTRAMUSCULAR; INTRAVENOUS at 22:32

## 2020-11-03 RX ADMIN — SODIUM CHLORIDE 1000 ML: 9 INJECTION, SOLUTION INTRAVENOUS at 20:13

## 2020-11-03 RX ADMIN — MIDAZOLAM 5 MG: 1 INJECTION INTRAMUSCULAR; INTRAVENOUS at 20:31

## 2020-11-03 RX ADMIN — MIDAZOLAM 4 MG: 1 INJECTION INTRAMUSCULAR; INTRAVENOUS at 21:45

## 2020-11-03 RX ADMIN — MIDAZOLAM HYDROCHLORIDE 3 MG/HR: 5 INJECTION, SOLUTION INTRAMUSCULAR; INTRAVENOUS at 23:34

## 2020-11-03 RX ADMIN — MIDAZOLAM HYDROCHLORIDE 1 MG/HR: 5 INJECTION, SOLUTION INTRAMUSCULAR; INTRAVENOUS at 23:05

## 2020-11-03 RX ADMIN — SUCCINYLCHOLINE CHLORIDE 200 MG: 20 INJECTION, SOLUTION INTRAMUSCULAR; INTRAVENOUS at 21:20

## 2020-11-03 ASSESSMENT — PULMONARY FUNCTION TESTS
PIF_VALUE: 25
PIF_VALUE: 29

## 2020-11-04 LAB
ANION GAP SERPL CALCULATED.3IONS-SCNC: 13 MMOL/L (ref 7–19)
BUN BLDV-MCNC: 7 MG/DL (ref 6–20)
CALCIUM SERPL-MCNC: 7.8 MG/DL (ref 8.6–10)
CHLORIDE BLD-SCNC: 109 MMOL/L (ref 98–111)
CO2: 20 MMOL/L (ref 22–29)
CREAT SERPL-MCNC: 0.8 MG/DL (ref 0.5–0.9)
EKG P AXIS: NORMAL DEGREES
EKG P-R INTERVAL: NORMAL MS
EKG Q-T INTERVAL: 274 MS
EKG QRS DURATION: 112 MS
EKG QTC CALCULATION (BAZETT): 435 MS
EKG T AXIS: 19 DEGREES
GFR AFRICAN AMERICAN: >59
GFR NON-AFRICAN AMERICAN: >60
GLUCOSE BLD-MCNC: 114 MG/DL (ref 74–109)
HCT VFR BLD CALC: 31.1 % (ref 37–47)
HEMOGLOBIN: 10.2 G/DL (ref 12–16)
LACTIC ACID: 1.3 MMOL/L (ref 0.5–1.9)
MAGNESIUM: 2.2 MG/DL (ref 1.6–2.6)
MCH RBC QN AUTO: 30.5 PG (ref 27–31)
MCHC RBC AUTO-ENTMCNC: 32.8 G/DL (ref 33–37)
MCV RBC AUTO: 93.1 FL (ref 81–99)
PDW BLD-RTO: 14 % (ref 11.5–14.5)
PLATELET # BLD: 280 K/UL (ref 130–400)
PMV BLD AUTO: 9.7 FL (ref 9.4–12.3)
POTASSIUM SERPL-SCNC: 3.7 MMOL/L (ref 3.5–5)
RBC # BLD: 3.34 M/UL (ref 4.2–5.4)
REASON FOR REJECTION: NORMAL
REJECTED TEST: NORMAL
SODIUM BLD-SCNC: 142 MMOL/L (ref 136–145)
TOTAL CK: 155 U/L (ref 26–192)
WBC # BLD: 24.5 K/UL (ref 4.8–10.8)

## 2020-11-04 PROCEDURE — 6360000002 HC RX W HCPCS: Performed by: PEDIATRICS

## 2020-11-04 PROCEDURE — 93010 ELECTROCARDIOGRAM REPORT: CPT | Performed by: INTERNAL MEDICINE

## 2020-11-04 PROCEDURE — 36415 COLL VENOUS BLD VENIPUNCTURE: CPT

## 2020-11-04 PROCEDURE — 95819 EEG AWAKE AND ASLEEP: CPT

## 2020-11-04 PROCEDURE — 6360000002 HC RX W HCPCS: Performed by: HOSPITALIST

## 2020-11-04 PROCEDURE — 2700000000 HC OXYGEN THERAPY PER DAY

## 2020-11-04 PROCEDURE — C9113 INJ PANTOPRAZOLE SODIUM, VIA: HCPCS | Performed by: PEDIATRICS

## 2020-11-04 PROCEDURE — 6360000002 HC RX W HCPCS: Performed by: INTERNAL MEDICINE

## 2020-11-04 PROCEDURE — 2580000003 HC RX 258: Performed by: PEDIATRICS

## 2020-11-04 PROCEDURE — 99223 1ST HOSP IP/OBS HIGH 75: CPT | Performed by: PSYCHIATRY & NEUROLOGY

## 2020-11-04 PROCEDURE — 6370000000 HC RX 637 (ALT 250 FOR IP): Performed by: INTERNAL MEDICINE

## 2020-11-04 PROCEDURE — 83605 ASSAY OF LACTIC ACID: CPT

## 2020-11-04 PROCEDURE — 5A1945Z RESPIRATORY VENTILATION, 24-96 CONSECUTIVE HOURS: ICD-10-PCS | Performed by: INTERNAL MEDICINE

## 2020-11-04 PROCEDURE — 95819 EEG AWAKE AND ASLEEP: CPT | Performed by: PSYCHIATRY & NEUROLOGY

## 2020-11-04 PROCEDURE — 0BH17EZ INSERTION OF ENDOTRACHEAL AIRWAY INTO TRACHEA, VIA NATURAL OR ARTIFICIAL OPENING: ICD-10-PCS | Performed by: INTERNAL MEDICINE

## 2020-11-04 PROCEDURE — 80048 BASIC METABOLIC PNL TOTAL CA: CPT

## 2020-11-04 PROCEDURE — 2000000000 HC ICU R&B

## 2020-11-04 PROCEDURE — 3E1G78Z IRRIGATION OF UPPER GI USING IRRIGATING SUBSTANCE, VIA NATURAL OR ARTIFICIAL OPENING: ICD-10-PCS | Performed by: INTERNAL MEDICINE

## 2020-11-04 PROCEDURE — 94002 VENT MGMT INPAT INIT DAY: CPT

## 2020-11-04 PROCEDURE — 2580000003 HC RX 258: Performed by: HOSPITALIST

## 2020-11-04 PROCEDURE — 85027 COMPLETE CBC AUTOMATED: CPT

## 2020-11-04 PROCEDURE — 6370000000 HC RX 637 (ALT 250 FOR IP): Performed by: HOSPITALIST

## 2020-11-04 RX ORDER — PROPOFOL 10 MG/ML
INJECTION, EMULSION INTRAVENOUS
Status: DISPENSED
Start: 2020-11-04 | End: 2020-11-04

## 2020-11-04 RX ORDER — POLYETHYLENE GLYCOL 3350 17 G/17G
238 POWDER, FOR SOLUTION ORAL ONCE
Status: COMPLETED | OUTPATIENT
Start: 2020-11-04 | End: 2020-11-04

## 2020-11-04 RX ORDER — PROPOFOL 10 MG/ML
INJECTION, EMULSION INTRAVENOUS
Status: DISCONTINUED
Start: 2020-11-04 | End: 2020-11-05

## 2020-11-04 RX ORDER — PROPOFOL 10 MG/ML
10 INJECTION, EMULSION INTRAVENOUS ONCE
Status: COMPLETED | OUTPATIENT
Start: 2020-11-04 | End: 2020-11-04

## 2020-11-04 RX ORDER — SODIUM CHLORIDE 0.9 % (FLUSH) 0.9 %
10 SYRINGE (ML) INJECTION EVERY 12 HOURS SCHEDULED
Status: DISCONTINUED | OUTPATIENT
Start: 2020-11-04 | End: 2020-11-07 | Stop reason: HOSPADM

## 2020-11-04 RX ORDER — BUPROPION HYDROCHLORIDE 300 MG/1
300 TABLET ORAL EVERY MORNING
Status: ON HOLD | COMMUNITY
End: 2020-11-07 | Stop reason: HOSPADM

## 2020-11-04 RX ORDER — ONDANSETRON 2 MG/ML
4 INJECTION INTRAMUSCULAR; INTRAVENOUS EVERY 6 HOURS PRN
Status: DISCONTINUED | OUTPATIENT
Start: 2020-11-04 | End: 2020-11-07 | Stop reason: HOSPADM

## 2020-11-04 RX ORDER — PROMETHAZINE HYDROCHLORIDE 25 MG/1
12.5 TABLET ORAL EVERY 6 HOURS PRN
Status: DISCONTINUED | OUTPATIENT
Start: 2020-11-04 | End: 2020-11-07 | Stop reason: HOSPADM

## 2020-11-04 RX ORDER — PROPOFOL 10 MG/ML
10 INJECTION, EMULSION INTRAVENOUS
Status: DISCONTINUED | OUTPATIENT
Start: 2020-11-04 | End: 2020-11-04

## 2020-11-04 RX ORDER — ACETAMINOPHEN 325 MG/1
650 TABLET ORAL EVERY 6 HOURS PRN
Status: DISCONTINUED | OUTPATIENT
Start: 2020-11-04 | End: 2020-11-07 | Stop reason: HOSPADM

## 2020-11-04 RX ORDER — SODIUM CHLORIDE 9 MG/ML
INJECTION, SOLUTION INTRAVENOUS CONTINUOUS
Status: DISCONTINUED | OUTPATIENT
Start: 2020-11-04 | End: 2020-11-05

## 2020-11-04 RX ORDER — BUPROPION HYDROCHLORIDE 150 MG/1
150 TABLET ORAL SEE ADMIN INSTRUCTIONS
Status: ON HOLD | COMMUNITY
End: 2020-11-07 | Stop reason: HOSPADM

## 2020-11-04 RX ORDER — ACETAMINOPHEN 650 MG/1
650 SUPPOSITORY RECTAL EVERY 6 HOURS PRN
Status: DISCONTINUED | OUTPATIENT
Start: 2020-11-04 | End: 2020-11-07 | Stop reason: HOSPADM

## 2020-11-04 RX ORDER — SODIUM CHLORIDE 0.9 % (FLUSH) 0.9 %
10 SYRINGE (ML) INJECTION PRN
Status: DISCONTINUED | OUTPATIENT
Start: 2020-11-04 | End: 2020-11-07 | Stop reason: HOSPADM

## 2020-11-04 RX ORDER — PROPOFOL 10 MG/ML
10 INJECTION, EMULSION INTRAVENOUS
Status: DISCONTINUED | OUTPATIENT
Start: 2020-11-04 | End: 2020-11-05

## 2020-11-04 RX ORDER — POLYETHYLENE GLYCOL 3350 17 G/17G
17 POWDER, FOR SOLUTION ORAL DAILY PRN
Status: DISCONTINUED | OUTPATIENT
Start: 2020-11-04 | End: 2020-11-07 | Stop reason: HOSPADM

## 2020-11-04 RX ADMIN — ENOXAPARIN SODIUM 40 MG: 40 INJECTION SUBCUTANEOUS at 09:28

## 2020-11-04 RX ADMIN — PROPOFOL 45 MCG/KG/MIN: 10 INJECTION, EMULSION INTRAVENOUS at 10:04

## 2020-11-04 RX ADMIN — MIDAZOLAM HYDROCHLORIDE 10 MG/HR: 5 INJECTION, SOLUTION INTRAMUSCULAR; INTRAVENOUS at 20:01

## 2020-11-04 RX ADMIN — SODIUM CHLORIDE 8 MG/HR: 9 INJECTION, SOLUTION INTRAVENOUS at 00:16

## 2020-11-04 RX ADMIN — POLYETHYLENE GLYCOL 3350 238 G: 17 POWDER, FOR SOLUTION ORAL at 11:37

## 2020-11-04 RX ADMIN — SODIUM CHLORIDE: 9 INJECTION, SOLUTION INTRAVENOUS at 05:32

## 2020-11-04 RX ADMIN — POLYETHYLENE GLYCOL 3350 238 G: 17 POWDER, FOR SOLUTION ORAL at 05:09

## 2020-11-04 RX ADMIN — PROPOFOL 60 MCG/KG/MIN: 10 INJECTION, EMULSION INTRAVENOUS at 21:40

## 2020-11-04 RX ADMIN — PANTOPRAZOLE SODIUM 80 MG: 40 INJECTION, POWDER, FOR SOLUTION INTRAVENOUS at 00:16

## 2020-11-04 RX ADMIN — SODIUM CHLORIDE, PRESERVATIVE FREE 10 ML: 5 INJECTION INTRAVENOUS at 19:55

## 2020-11-04 RX ADMIN — ACTIVATED CHARCOAL 50 G: 208 SUSPENSION ORAL at 04:51

## 2020-11-04 RX ADMIN — MIDAZOLAM HYDROCHLORIDE 10 MG/HR: 5 INJECTION, SOLUTION INTRAMUSCULAR; INTRAVENOUS at 08:24

## 2020-11-04 ASSESSMENT — PULMONARY FUNCTION TESTS
PIF_VALUE: 35
PIF_VALUE: 30
PIF_VALUE: 30
PIF_VALUE: 33
PIF_VALUE: 26
PIF_VALUE: 29
PIF_VALUE: 32
PIF_VALUE: 33
PIF_VALUE: 32
PIF_VALUE: 30
PIF_VALUE: 31
PIF_VALUE: 33
PIF_VALUE: 35
PIF_VALUE: 33
PIF_VALUE: 36
PIF_VALUE: 32
PIF_VALUE: 35
PIF_VALUE: 36
PIF_VALUE: 27
PIF_VALUE: 33
PIF_VALUE: 32
PIF_VALUE: 26
PIF_VALUE: 35
PIF_VALUE: 26
PIF_VALUE: 34
PIF_VALUE: 26
PIF_VALUE: 31
PIF_VALUE: 40
PIF_VALUE: 33
PIF_VALUE: 32
PIF_VALUE: 34
PIF_VALUE: 34

## 2020-11-04 NOTE — ED NOTES
Per pharmacy robe is on backorder. Poison control states to use miralax in place. Per pharmacy bowel prep is 238g of miralax mixed in gatorade.  Poison control states to do activated charcoal and then miralax       Jasper Membreno RN  11/04/20 0497

## 2020-11-04 NOTE — ED NOTES
Poison control states that the patient is high risk in anything over >400mg (Toxic dose). Hallucinations, seizure high risk for 24hr post ingestion, high risk for rhabdo r/t hyperactivity, Ventricular dysrhthmias    Propofol for sedation.     Spoke with Laura Shrestha at [address] [place] [address],[facility] Floor center       [facility]  11/03/20 [facility]

## 2020-11-04 NOTE — CONSULTS
[facility] Neurology Consult  ? ? Patient: Neno Denton  MR#: [id number]  Account Number: [de-identified]   Room: 15/15   YOB: 1990  Date of Progress Note: 11/4/2020  Time of Note 10:57 AM  Attending Physician: Lorenzo Wilburn, DO  Consulting Physician: Lucia Olvera M.D.  ?  ? CHIEF COMPLAINT: Drug overdose and seizures  ? HISTORY OF PRESENT ILLNESS:   This 31 y.o. female who presents with drug overdose and seizures. Apparently took a large amount of Wellbutrin in a suicide attempt and was said to be confused, weak, hallucinating and had 1 or more seizures on her way to the ED but none since then. Currently getting gastric lavage and intubated and sedated. EEG at the bedside. No family around but no mention of a previous history of seizures. REVIEW OF SYSTEMS:  Unable to obtain as patient is intubated  ?   Past Medical History:      Diagnosis Date    ADHD (attention deficit hyperactivity disorder)     Anxiety     Depression     Schizophrenia ([facility] [address]      Past Surgical History:      Procedure Laterality Date    CYST REMOVAL      right ovary    CYST REMOVAL      wrist     Medications in Hospital:     Current Facility-Administered Medications:     propofol 1000 MG/100ML injection, , , ,     0.9 % sodium chloride infusion, , Intravenous, Continuous, Edward Morris MD, Last Rate: 100 mL/hr at 11/04/20 0532    propofol 1000 MG/100ML injection, , , ,     sodium chloride flush 0.9 % injection 10 mL, 10 mL, Intravenous, 2 times per day, Edward Morris MD    sodium chloride flush 0.9 % injection 10 mL, 10 mL, Intravenous, PRN, Edward Morris MD    acetaminophen (TYLENOL) tablet 650 mg, 650 mg, Oral, Q6H PRN **OR** acetaminophen (TYLENOL) suppository 650 mg, 650 mg, Rectal, Q6H PRN, Edward Morris MD    polyethylene glycol (GLYCOLAX) packet 17 g, 17 g, Oral, Daily PRN, Edward Morris MD    promethazine (PHENERGAN) tablet 12.5 mg, 12.5 mg, Oral, Q6H PRN reactive. Conjugate. Face is symmetric. She is breathing on the ventilator. Motor Exam  V/V throughout upper and lower extremities bilaterally, no cogwheeling, normal tone  Sensory Exam  Unreliable  Reflexes   Absent throughout. No Babinski's  Tremors- no tremors in hands or head noted  Gait  Not tested  Coordination  Unable to test  ?  ?  CBC:   Recent Labs     11/03/20 1937 11/04/20 0622   WBC 27.1* 24.5*   HGB 12.1 10.2*    280     BMP:   Recent Labs     11/03/20 1937 11/03/20 2212 11/04/20 0622     --  142   K 3.7 3.3 3.7     --  109   CO2 19*  --  20*   BUN 8  --  7   CREATININE 1.0*  --  0.8   GLUCOSE 152*  --  114*     Hepatic:   Recent Labs     11/03/20 1937   AST 12   ALT 14   BILITOT <0.2   ALKPHOS 66     Lipids: No results for input(s): CHOL, HDL in the last 72 hours. Invalid input(s): LDLCALCU  INR: No results for input(s): INR in the last 72 hours. ?  ?  Assessment and Plan   1. Presumably new onset seizures in a patient with a Wellbutrin overdose. The former is likely caused by the latter. I will review her EEG when it is available. We will continue to follow with you. ?  ?       Johnna Alex MD  Board Certified in Neurology

## 2020-11-04 NOTE — ED NOTES
Bed: 01-A  Expected date: 11/3/20  Expected time:   Means of arrival: [id number] [facility]  11/03/20 1930

## 2020-11-04 NOTE — PROGRESS NOTES
Follow-up note from this morning. She is receiving charcoal for her overdose. Hemodynamics are stable. She is being supported on the ventilator for her airway at this time. Continue support.

## 2020-11-04 NOTE — ED NOTES
Poison control contacted re patient's intentional ingestion of 8650mg Wellbutrin XR.      Keira Forrest RN  11/03/20 1939

## 2020-11-04 NOTE — H&P
History and Physical    Patient Name:  Jayden Joshua    :  1990    Chief Complaint:   Overdose     History of Present Illness:   Jayden Joshua presents to [facility] after she overdosed on Wellbutrin. She apparently took 8600 mg of Wellbutrin. She became confused, weak, hallucinating and was brought by her parents to ED. She has history of anixety/depression/schizophrenia. She developed seizures on her way to ED. In ED she was intubated and heavily sedated. No more seizures since then. Per posion control patient needs robe via NG tube at 1-2L per hour until clear. Also needs mag and CK level    Past Medical History:   has a past medical history of ADHD (attention deficit hyperactivity disorder), Anxiety, Depression, and Schizophrenia ([address] Surgical History:   has a past surgical history that includes cyst removal and cyst removal.     Social History:   reports that she has been smoking. She has been smoking about 1.00 pack per day. She has never used smokeless tobacco. She reports that she does not drink alcohol or use drugs. Family History:  HTN father     Medications:  Prior to Admission medications    Medication Sig Start Date End Date Taking? Authorizing Provider   ondansetron (ZOFRAN) 4 MG tablet Take 2 tablets by mouth every 8 hours as needed for Nausea or Vomiting 17   SHERWIN Rodas CNP       Allergies:  Patient has no known allergies. Review of Systems:   Not obtainable 2/2 intubation and sedation     Physical Examination:    Vital Signs: /71   Pulse 117   Temp 98.9 °F (37.2 °C) (Oral)   Resp 29   Ht 5' 10\" (1.778 m)   Wt 200 lb (90.7 kg)   SpO2 100%   BMI 28.70 kg/m²   General appearance: Well preserved, mesomorphic body habitus, intubated, sedated  Skin: Skin color, texture, turgor normal. No rashes or lesions. No induration or tightening palpated. Head: Normocephalic.  No masses, lesions, tenderness or abnormalities  Eyes: conjunctivae/corneas clear.  Sclera non icteric. Ears: External ears normal.    Nose/Sinuses: Nares normal. Septum midline. Mucosa normal. No drainage or sinus tenderness. Oropharynx: Lips, mucosa, and tongue dry  Neck: Neck supple, and symmetric. No adenopathy. Trachea is midline. Carotids brisk in upstroke without bruits, No abnormal JVP noted at 45°. Lungs: Lungs clear to auscultation bilaterally. No retractions or use of accessory muscles. No vocal fremitus. No ronchi, crackle or rale. Heart:  S1 > S2. Regular rate and rhythm. No gallop, murmur, rub, palpable thrill or heave noted. Abdomen: Abdomen soft, non-tender. BS normal. No masses, organomegaly. No hernia noted. Extremities: Extremities normal. No deformities, edema, or skin discoloration. No cyanosis or clubbing noted to the nails. Peripheral pulses 4/4. Neuro: no focal deficits noted     Pertinent Labs:  CBC:   Recent Labs     11/03/20 1937 11/04/20  0622   WBC 27.1* 24.5*   RBC 3.96* 3.34*   HGB 12.1 10.2*   HCT 36.4* 31.1*   MCV 91.9 93.1   MCH 30.6 30.5   MCHC 33.2 32.8*   RDW 13.6 14.0    280   MPV 9.7 9.7     BMP:   Recent Labs     11/03/20 1937 11/03/20  2212 11/04/20  0622     --  142   K 3.7 3.3 3.7     --  109   CO2 19*  --  20*   BUN 8  --  7   CREATININE 1.0*  --  0.8   GLUCOSE 152*  --  114*   CALCIUM 8.7  --  7.8*     ABGs: No results for input(s): PO2, PCO2, PH, HCO3, BE, O2SAT in the last 72 hours. INR: No results for input(s): INR, PROTIME in the last 72 hours.   BNP:  No results found for: BNP  TSH:   Lab Results   Component Value Date    TSH 4.68 (H) 02/16/2017     Cardiac Injury Profile:   Lab Results   Component Value Date    CKTOTAL 155 11/03/2020    TROPONINI <0.01 11/03/2020     Lipid Profile: No components found for: CHLPL  No results found for: TRIG  No results found for: HDL  No results found for: LDLCALC  No results found for: LABVLDL  Hemoglobin A1C: No results found for: LABA1C  No results found for: EAG      Assessment/Plan:  · Overdose- currently sedated and intubated- poison control aware  · New onset seizures- none since sedated  · Schizophrenia, anxiety, depression- consult psych when medically cleared   · Lactic acidosis and leukocytosis- ivf- no signs of infection                  I have reviewed my findings and recommendations in detail with Tara Kendrick.     Ariana Bone MD   Admission level 2

## 2020-11-04 NOTE — PROGRESS NOTES
Spoke with poison control and they recommended another dose of 238g glycolax. Dr. Kwabena Olvera notified.   Will place order

## 2020-11-04 NOTE — ED PROVIDER NOTES
[facility]  eMERGENCY dEPARTMENT eNCOUnter      Pt Name: Shayla Spring  MRN: [id number]  Rejigfmary 1990  Date of evaluation: 11/3/2020  Provider: Diana Rodriguez MD    [address] [place] [address]       Chief Complaint   Patient presents with    Drug Overdose     intentional OD Wellbutrin XL 8650mg    Seizures     2 witnessed         HISTORY OF PRESENT ILLNESS   (Location/Symptom, Timing/Onset,Context/Setting, Quality, Duration, Modifying Factors, Severity)  Note limiting factors. Shayla Spring is a 31 y.o. female who presents to the emergency department overdose. Patient took 8600 mg of Wellbutrin. Patient filled Wellbutrin prescription today after seeing psychiatrist in [facility]. Patient has a history of suicide attempts in the past.  Patient was observed to have 2 seizures prior to arrival.  Patient is unable to give history at this time as she is actively having a seizure. Brother gives history. HPI    NursingNotes were reviewed. REVIEW OF SYSTEMS    (2-9 systems for level 4, 10 or more for level 5)     Review of Systems   Unable to perform ROS: Acuity of condition   Neurological: Positive for seizures. PAST MEDICALHISTORY     Past Medical History:   Diagnosis Date    ADHD (attention deficit hyperactivity disorder)     Anxiety     Depression     Schizophrenia ([facility] [address]          SURGICAL HISTORY       Past Surgical History:   Procedure Laterality Date    CYST REMOVAL      right ovary    CYST REMOVAL      wrist         CURRENT MEDICATIONS     Previous Medications    METHADONE HCL PO    Take 50 mg by mouth    ONDANSETRON (ZOFRAN) 4 MG TABLET    Take 2 tablets by mouth every 8 hours as needed for Nausea or Vomiting    PRENATAL VIT-FE FUMARATE-FA (PRENATAL VITAMIN PO)    Take by mouth    QUETIAPINE (SEROQUEL) 25 MG TABLET    Take 25 mg by mouth once        ALLERGIES     Patient has no known allergies. FAMILY HISTORY     History reviewed. No pertinent family history. SOCIAL HISTORY       Social History     Socioeconomic History    Marital status: Single     Spouse name: None    Number of children: None    Years of education: None    Highest education level: None   Occupational History    None   Social Needs    Financial resource strain: None    Food insecurity     Worry: None     Inability: None    Transportation needs     Medical: None     Non-medical: None   Tobacco Use    Smoking status: Heavy Tobacco Smoker     Packs/day: 1.00    Smokeless tobacco: Never Used   Substance and Sexual Activity    Alcohol use: No    Drug use: No    Sexual activity: Yes     Partners: Male   Lifestyle    Physical activity     Days per week: None     Minutes per session: None    Stress: None   Relationships    Social connections     Talks on phone: None     Gets together: None     Attends Religion service: None     Active member of club or organization: None     Attends meetings of clubs or organizations: None     Relationship status: None    Intimate partner violence     Fear of current or ex partner: None     Emotionally abused: None     Physically abused: None     Forced sexual activity: None   Other Topics Concern    None   Social History Narrative    None       SCREENINGS    [place] Coma Scale  Eye Opening: Spontaneous  Best Verbal Response: Confused  Best Motor Response: Localizes pain  [place] Coma Scale Score: 13        PHYSICAL EXAM    (up to 7 for level 4, 8 or more for level 5)     ED Triage Vitals   BP Temp Temp Source Pulse Resp SpO2 Height Weight   11/03/20 1936 11/03/20 1942 11/03/20 1942 11/03/20 1936 11/03/20 1936 11/03/20 1936 11/03/20 2007 11/03/20 2007   135/64 98.9 °F (37.2 °C) Oral 155 26 93 % 5' 10\" (1.778 m) 200 lb (90.7 kg)       Physical Exam  Vitals signs and nursing note reviewed. Constitutional:       General: She is in acute distress. Appearance: She is ill-appearing. Comments: Patient begins seizing as I enter the room.   Patient is images are visualized and preliminarily interpreted bythe emergency physician with the below findings:        XR CHEST PORTABLE   Final Result   Line and support tubes well-positioned. Signed by Dr Radha Hannah on 11/3/2020 10:09 PM      XR CHEST PORTABLE   Final Result   1. Shallow inspiration with diminished lung volumes with perihilar   bronchovascular interstitial prominence.    Signed by Dr Radha Hannah on 11/3/2020 10:29 PM      CT head without contrast    (Results Pending)           LABS:  Labs Reviewed   CBC WITH AUTO DIFFERENTIAL - Abnormal; Notable for the following components:       Result Value    WBC 27.1 (*)     RBC 3.96 (*)     Hematocrit 36.4 (*)     Neutrophils % 84.9 (*)     Lymphocytes % 9.7 (*)     Neutrophils Absolute 23.0 (*)     Monocytes Absolute 1.00 (*)     All other components within normal limits   COMPREHENSIVE METABOLIC PANEL W/ REFLEX TO MG FOR LOW K - Abnormal; Notable for the following components:    CO2 19 (*)     Glucose 152 (*)     CREATININE 1.0 (*)     All other components within normal limits   LACTIC ACID, PLASMA - Abnormal; Notable for the following components:    Lactic Acid 8.1 (*)     All other components within normal limits    Narrative:     CALL  [place]  [facility] tel. ,  Chemistry results called to and read back by Maricarmen Romano RN in ED, 11/03/2020  20:35, by [address] REFLEX TO CULTURE - Abnormal; Notable for the following components:    Ketones, Urine TRACE (*)     Protein, UA TRACE (*)     All other components within normal limits   URINE DRUG SCREEN - Abnormal; Notable for the following components:    Amphetamine Screen, Urine Positive (*)     All other components within normal limits   BLOOD GAS, ARTERIAL - Abnormal; Notable for the following components:    pO2, Arterial 331.0 (*)     Hemoglobin, Art, Extended 11.2 (*)     All other components within normal limits   POCT GLUCOSE - Abnormal; Notable for the following components:    POC Glucose 133 (*)     All other CXR findings:  ETT in proper place  Post-procedure details:     Patient tolerance of procedure: Tolerated well, no immediate complications        FINAL IMPRESSION      1. Drug overdose, undetermined intent, initial encounter    2. Recurrent seizures ([facility] [address]          DISPOSITION/PLAN   DISPOSITION Admitted 11/03/2020 10:09:14 PM      PATIENT REFERRED TO:  No follow-up provider specified.     DISCHARGE MEDICATIONS:  New Prescriptions    No medications on file          (Please note that portions of this note were completed with a voice recognition program.  Efforts were made to edit thedictations but occasionally words are mis-transcribed.)    Mukesh Ya MD (electronically signed)  Attending Emergency Physician          Mukesh Ya MD  11/04/20 9303

## 2020-11-04 NOTE — ED NOTES
Per posion control patient needs robe via NG tube at 1-2L per hour until clear.  Also states we need to do a mag and CK level     Estelita Thomas RN  11/04/20 9873

## 2020-11-04 NOTE — ED NOTES
NG Tube placed, 18Fr, 55 left nare. Placement verified via xray.       Laurel Stauffer RN  11/03/20 8600

## 2020-11-04 NOTE — ED NOTES
Seizure activity lasting 1 min     Beronica Jin RN  11/03/20 2011       Beronica Jin RN  11/03/20 2012

## 2020-11-05 LAB
HCT VFR BLD CALC: 32.1 % (ref 37–47)
HEMOGLOBIN: 10.1 G/DL (ref 12–16)
LACTIC ACID: 1.1 MMOL/L (ref 0.5–1.9)
MCH RBC QN AUTO: 30.4 PG (ref 27–31)
MCHC RBC AUTO-ENTMCNC: 31.5 G/DL (ref 33–37)
MCV RBC AUTO: 96.7 FL (ref 81–99)
PDW BLD-RTO: 14.6 % (ref 11.5–14.5)
PLATELET # BLD: 260 K/UL (ref 130–400)
PMV BLD AUTO: 9.9 FL (ref 9.4–12.3)
RBC # BLD: 3.32 M/UL (ref 4.2–5.4)
WBC # BLD: 14.5 K/UL (ref 4.8–10.8)

## 2020-11-05 PROCEDURE — 2700000000 HC OXYGEN THERAPY PER DAY

## 2020-11-05 PROCEDURE — 6370000000 HC RX 637 (ALT 250 FOR IP): Performed by: INTERNAL MEDICINE

## 2020-11-05 PROCEDURE — 85027 COMPLETE CBC AUTOMATED: CPT

## 2020-11-05 PROCEDURE — 94003 VENT MGMT INPAT SUBQ DAY: CPT

## 2020-11-05 PROCEDURE — 99232 SBSQ HOSP IP/OBS MODERATE 35: CPT | Performed by: PSYCHIATRY & NEUROLOGY

## 2020-11-05 PROCEDURE — 6360000002 HC RX W HCPCS: Performed by: HOSPITALIST

## 2020-11-05 PROCEDURE — 1210000000 HC MED SURG R&B

## 2020-11-05 PROCEDURE — 2580000003 HC RX 258: Performed by: PEDIATRICS

## 2020-11-05 PROCEDURE — 83605 ASSAY OF LACTIC ACID: CPT

## 2020-11-05 PROCEDURE — 92522 EVALUATE SPEECH PRODUCTION: CPT

## 2020-11-05 PROCEDURE — 2580000003 HC RX 258: Performed by: HOSPITALIST

## 2020-11-05 PROCEDURE — 2580000003 HC RX 258: Performed by: INTERNAL MEDICINE

## 2020-11-05 PROCEDURE — 6360000002 HC RX W HCPCS: Performed by: PEDIATRICS

## 2020-11-05 PROCEDURE — 36415 COLL VENOUS BLD VENIPUNCTURE: CPT

## 2020-11-05 PROCEDURE — 92610 EVALUATE SWALLOWING FUNCTION: CPT

## 2020-11-05 RX ORDER — HYDROCODONE BITARTRATE AND ACETAMINOPHEN 5; 325 MG/1; MG/1
1 TABLET ORAL EVERY 6 HOURS PRN
Status: DISCONTINUED | OUTPATIENT
Start: 2020-11-05 | End: 2020-11-07 | Stop reason: HOSPADM

## 2020-11-05 RX ORDER — NICOTINE 21 MG/24HR
1 PATCH, TRANSDERMAL 24 HOURS TRANSDERMAL EVERY 24 HOURS
Status: ON HOLD | COMMUNITY
End: 2020-11-10 | Stop reason: HOSPADM

## 2020-11-05 RX ADMIN — SODIUM CHLORIDE, PRESERVATIVE FREE 10 ML: 5 INJECTION INTRAVENOUS at 10:13

## 2020-11-05 RX ADMIN — SODIUM CHLORIDE: 9 INJECTION, SOLUTION INTRAVENOUS at 03:12

## 2020-11-05 RX ADMIN — MIDAZOLAM HYDROCHLORIDE 10 MG/HR: 5 INJECTION, SOLUTION INTRAMUSCULAR; INTRAVENOUS at 06:28

## 2020-11-05 RX ADMIN — ENOXAPARIN SODIUM 40 MG: 40 INJECTION SUBCUTANEOUS at 10:13

## 2020-11-05 RX ADMIN — HYDROCODONE BITARTRATE AND ACETAMINOPHEN 1 TABLET: 5; 325 TABLET ORAL at 13:26

## 2020-11-05 RX ADMIN — SODIUM CHLORIDE, PRESERVATIVE FREE 10 ML: 5 INJECTION INTRAVENOUS at 20:13

## 2020-11-05 RX ADMIN — PROPOFOL 60 MCG/KG/MIN: 10 INJECTION, EMULSION INTRAVENOUS at 00:24

## 2020-11-05 RX ADMIN — PROPOFOL 60 MCG/KG/MIN: 10 INJECTION, EMULSION INTRAVENOUS at 03:12

## 2020-11-05 RX ADMIN — PROPOFOL 50 MCG/KG/MIN: 10 INJECTION, EMULSION INTRAVENOUS at 06:28

## 2020-11-05 RX ADMIN — HYDROCODONE BITARTRATE AND ACETAMINOPHEN 1 TABLET: 5; 325 TABLET ORAL at 19:37

## 2020-11-05 RX ADMIN — BENZOCAINE 6 MG-MENTHOL 10 MG LOZENGES 1 LOZENGE: at 16:09

## 2020-11-05 RX ADMIN — PROPOFOL 50 MCG/KG/MIN: 10 INJECTION, EMULSION INTRAVENOUS at 10:12

## 2020-11-05 ASSESSMENT — PULMONARY FUNCTION TESTS
PIF_VALUE: 26
PIF_VALUE: 29
PIF_VALUE: 25
PIF_VALUE: 29
PIF_VALUE: 25
PIF_VALUE: 26
PIF_VALUE: 27
PIF_VALUE: 35
PIF_VALUE: 29
PIF_VALUE: 32
PIF_VALUE: 32
PIF_VALUE: 29
PIF_VALUE: 25
PIF_VALUE: 29
PIF_VALUE: 26
PIF_VALUE: 29
PIF_VALUE: 27
PIF_VALUE: 11
PIF_VALUE: 25
PIF_VALUE: 10
PIF_VALUE: 27
PIF_VALUE: 28
PIF_VALUE: 28
PIF_VALUE: 10
PIF_VALUE: 25
PIF_VALUE: 33
PIF_VALUE: 26
PIF_VALUE: 28
PIF_VALUE: 27
PIF_VALUE: 28
PIF_VALUE: 28
PIF_VALUE: 29
PIF_VALUE: 26

## 2020-11-05 ASSESSMENT — PAIN DESCRIPTION - DESCRIPTORS
DESCRIPTORS: ACHING;STABBING
DESCRIPTORS: SORE
DESCRIPTORS: SHARP;STABBING

## 2020-11-05 ASSESSMENT — PAIN SCALES - GENERAL
PAINLEVEL_OUTOF10: 8
PAINLEVEL_OUTOF10: 0
PAINLEVEL_OUTOF10: 7
PAINLEVEL_OUTOF10: 7
PAINLEVEL_OUTOF10: 5

## 2020-11-05 ASSESSMENT — PAIN DESCRIPTION - LOCATION
LOCATION: THROAT
LOCATION: THROAT
LOCATION: GENERALIZED

## 2020-11-05 ASSESSMENT — PAIN DESCRIPTION - FREQUENCY
FREQUENCY: CONTINUOUS

## 2020-11-05 ASSESSMENT — PAIN SCALES - WONG BAKER
WONGBAKER_NUMERICALRESPONSE: 0

## 2020-11-05 ASSESSMENT — PAIN DESCRIPTION - PAIN TYPE
TYPE: ACUTE PAIN

## 2020-11-05 NOTE — PROGRESS NOTES
Called patient home pharmacy at this time, requesting updated med list to be faxed.   Electronically signed by Samuel Bonner RN on 11/5/2020 at 11:52 AM

## 2020-11-05 NOTE — PROGRESS NOTES
Speech Language Pathology  Facility/Department: [facility] ICU   CLINICAL BEDSIDE SWALLOW EVALUATION  SPEECH PRODUCTION EVALUATION     NAME: Arnoldo Melissa  : 1990  MRN: [id number]    ADMISSION DATE: 11/3/2020  ADMITTING DIAGNOSIS: has Schizoaffective disorder ([address] and Seizure ([facility] [address] on their problem list.    Date of Eval: 2020  Evaluating Therapist: Aric Olvera    Current Diet level:  NPO    Reason for Referral  Arnoldo Melissa was referred for a bedside swallow evaluation to assess the efficiency of her swallow function, identify signs and symptoms of aspiration and make recommendations regarding safe dietary consistencies, effective compensatory strategies, and safe eating environment. Impression  Assessed patient's swallowing function S/P extubation this date. Patient exhibits decreased oral prep of more solid consistencies as well as sluggish, mild-moderately decreased laryngeal elevation for swallow airway protection. Even so, no outward S/S penetration/aspiration was noted with any ice chip trial, puree consistency trial, regular solid consistency trial, or thin H2O trial administered during evaluation this date. At this time, would trial regular solid consistency and thin liquids. Self-feed. Will continue to follow. Thank you for this consult. Treatment Plan  Requires SLP Intervention: Yes     Recommended Diet and Intervention  Diet Solids Recommendation: Regular solid   Liquid Consistency Recommendation: Thin   Recommended Form of Meds: PO  Therapeutic Interventions: Patient/Family education;Diet tolerance monitoring     Compensatory Swallowing Strategies  Compensatory Swallowing Strategies: Upright as possible for all oral intake;Small bites/sips;Eat/Feed slowly; Alternate solids and liquids; Remain upright for 30-45 minutes after meals     Treatment/Goals  Timeframe for Short-term Goals: 1x/day for 2 days   Goal 1: Patient will tolerate regular solid consistency and thin liquids with min S/S penetration/aspiration during PO intake. Goal 2: Patient staff will follow swallow safety recommendations to decrease risk of penetration/aspiration during PO intake. Goal 3: Monitor speech production. General  Chart Reviewed: Yes  Behavior/Cognition: Alert; Cooperative  O2 Device: None (Room air)  Communication Observation: (Assessed patient's speech production. Patient exhibits decreased volume of speech with mildly hoarse vocal quality noted. SLP ranked functional intelligibility for unfamiliar listeners at % in utterances with background noise present.)  Follows Directions: Simple   Dentition: Upper dentures present (however, no adhesive noted and loose dentures observed)  Patient Positioning: Upright in bed  Consistencies Administered: Ice chips;Dysphagia Pureed (Dysphagia I); Regular solid; Thin - cup      Oral Motor Examination   Labial ROM: (Adequate during labial retraction trials and labial protrusion trials.)  Labial Strength: (Adequate during labial compression trials.)  Labial Coordination: (Adequate movements were noted.)  Lingual ROM: (Adequate during lingual extension trials with full point achieved; adequate during lingual elevation trials without use of accessory jaw movement; adequate movements noted bilaterally.)  Lingual Coordination: (Adequate movements were noted.)     Assessed patient's swallowing function with the following observations noted:     Oral Phase  Mastication: Ice chips;Regular solid (Patient exhibited decreased rotary jaw movement during oral prep of ice chip trials (presented by SLP) and regular solid consistency trials presented independently.)  Suspected Premature Bolus Loss: Regular solid (Oral transit of regular solid consistency trials varied from 2-3 seconds in length.)  Oral Phase - Comment: Oral transit of ice chip trials primarily measured 1-2 seconds in length.  Oral transit of puree consistency trials, presented by SLP, primarily measured 1-2 seconds in length and no oral cavity residue was noted post swallows. Min regular solid consistency residue was observed post swallows; residue cleared from the mouth with additional dry swallows. Oral transit of thin H2O trials, presented independently via cup, primarily measured 1-2 seconds in length. Pharyngeal Phase  Suspected Swallow Delay: Regular solid (Suspect secondary to oral transit times.)  Decreased Laryngeal Elevation: (Patient exhibited sluggish, mild-moderately decreased laryngeal elevation for swallow airway protection.)  Pharyngeal Phase - Comment: No outward S/S penetration/aspiration was noted with any ice chip trial, puree consistency trial, regular solid consistency trial, or thin H2O trial administered during assessment this date. At this time, would trial regular solid consistency and thin liquids. Self-feed. Will continue to follow.     Electronically signed by MIKEY Toledo on 11/5/2020 at 1:07 PM

## 2020-11-05 NOTE — PROGRESS NOTES
Patient denies overdosing on her Wellbutrin. Patient denies suicidal or homicidal ideations. Patient claims that her mother took all her Wellbutrin. Patient denies hallucinations. Claims \"this all must be a dream, its not real, I would never overdose or try to hurt myself. \"  Electronically signed by Manuel Hazel RN on 11/5/2020 at 12:31 PM

## 2020-11-05 NOTE — PROGRESS NOTES
Patient treating to leave AMA to her father. Patient stating \"This is a lie, I did not do this. \" Patient voicing I don't need a psych doctor, I'm fine. This is not real.\" Father informs patient that she was found by 6 people down and had to be carried out, hallucinating, with a Wellbutrin bottle beside her. Patient denies allegations, voicing it is a lie.   Electronically signed by Timothy Cortes RN on 11/5/2020 at 2:14 PM

## 2020-11-05 NOTE — PROGRESS NOTES
Hospitalist Progress Note    Patient:  Barbara Mccoy  YOB: 1990  Date of Service: 11/5/2020  MRN: [id number]   Acct: [de-identified]   Primary Care Physician: Curtis Fink MD  Advance Directive: Full Code  Admit Date: 11/3/2020       Hospital Day: 2  Referring Provider: Lora Steinberg DO    Patient Seen, Chart, Consults, Notes, Labs, Radiology studies reviewed. Subjective:  Barbara Mccoy is a 31 y.o. female  whom we are following for overdose of Wellbutrin, new onset seizures, respiratory failure. She did well overnight. Hemodynamics are stable. I would like to stop sedation and attempt to liberate her from the ventilator this morning. I spoke to her father at bedside. She had just been to the psychiatrist and was prescribed Wellbutrin the day she was admitted. She has a history of schizophrenia. Allergies:  Patient has no known allergies.     Medicines:  Current Facility-Administered Medications   Medication Dose Route Frequency Provider Last Rate Last Dose    0.9 % sodium chloride infusion   Intravenous Continuous Yadira Avelar  mL/hr at 11/05/20 0800      sodium chloride flush 0.9 % injection 10 mL  10 mL Intravenous 2 times per day Yadira Avelar MD   10 mL at 11/05/20 1013    sodium chloride flush 0.9 % injection 10 mL  10 mL Intravenous PRN Yadira Avelar MD        acetaminophen (TYLENOL) tablet 650 mg  650 mg Oral Q6H PRN Yadira Avelar MD        Or   [facility] acetaminophen (TYLENOL) suppository 650 mg  650 mg Rectal Q6H PRN Yadira Avelar MD        polyethylene glycol [facility]) packet 17 g  17 g Oral Daily PRN Yadira Avelar MD        promethazine (PHENERGAN) tablet 12.5 mg  12.5 mg Oral Q6H PRN Yadira Avelar MD        Or    ondansetron [facility]) injection 4 mg  4 mg Intravenous Q6H PRN Yadira Avelar MD        enoxaparin (LOVENOX) injection 40 mg  40 mg Subcutaneous Daily Yadira Avelar MD   40 mg at 11/05/20 1013    propofol injection  10 mcg/kg/min Intravenous Titrated Tana Thorpe MD   Stopped at 11/05/20 1019    midazolam (VERSED) 100 mg in dextrose 5 % 100 mL infusion  1 mg/hr Intravenous Continuous Luis Stratton MD 8 mL/hr at 11/05/20 1020 8 mg/hr at 11/05/20 1020    pantoprazole (PROTONIX) 80 mg in sodium chloride 0.9 % 100 mL infusion  8 mg/hr Intravenous Continuous Luis Stratton MD   Stopped at 11/04/20 5124       Past Medical History:  Past Medical History:   Diagnosis Date    ADHD (attention deficit hyperactivity disorder)     Anxiety     Depression     Schizophrenia ([address]        Past Surgical History:  Past Surgical History:   Procedure Laterality Date    CYST REMOVAL      right ovary    CYST REMOVAL      wrist       Family History  Family History   Problem Relation Age of Onset    Depression Mother     Substance Abuse Mother     High Blood Pressure Mother     Mental Illness Mother     High Blood Pressure Father     High Cholesterol Father     Depression Brother     Mental Illness Brother        Social History  Social History     Socioeconomic History    Marital status: Single     Spouse name: Not on file    Number of children: 1    Years of education: Not on file    Highest education level: Not on file   Occupational History    Not on file   Social Needs    Financial resource strain: Not on file    Food insecurity     Worry: Not on file     Inability: Not on file   [employer] needs     Medical: Not on file     Non-medical: Not on file   Tobacco Use    Smoking status: Heavy Tobacco Smoker     Packs/day: 1.00     Types: Cigarettes    Smokeless tobacco: Never Used   Substance and Sexual Activity    Alcohol use: No    Drug use: Not on file    Sexual activity: Yes     Partners: Male   Lifestyle    Physical activity     Days per week: Not on file     Minutes per session: Not on file    Stress: Not on file   Relationships    Social connections     Talks on phone: Not on file     Gets together: Not on file     Attends Mandaeism service: Not on file     Active member of club or organization: Not on file     Attends meetings of clubs or organizations: Not on file     Relationship status: Not on file    Intimate partner violence     Fear of current or ex partner: Not on file     Emotionally abused: Not on file     Physically abused: Not on file     Forced sexual activity: Not on file   Other Topics Concern    Not on file   Social History Narrative    Not on file         Review of Systems:    Review of Systems   Unable to perform ROS: Intubated           Objective:  Blood pressure 119/78, pulse 95, temperature 98.8 °F (37.1 °C), temperature source Temporal, resp. rate 15, height 5' 10\" (1.778 m), weight 209 lb 4.8 oz (94.9 kg), SpO2 99 %, unknown if currently breastfeeding. Intake/Output Summary (Last 24 hours) at 11/5/2020 1021  Last data filed at 11/5/2020 0800  Gross per 24 hour   Intake 1701.94 ml   Output 3295 ml   Net -1593.06 ml       Physical Exam  Vitals signs and nursing note reviewed. HENT:      Head: Normocephalic and atraumatic. Right Ear: External ear normal.      Left Ear: External ear normal.      Nose: Nose normal.      Mouth/Throat:      Mouth: Mucous membranes are moist.   Eyes:      Conjunctiva/sclera: Conjunctivae normal.   Neck:      Musculoskeletal: Neck supple. No neck rigidity or muscular tenderness. Cardiovascular:      Rate and Rhythm: Normal rate and regular rhythm. Heart sounds: Normal heart sounds. Pulmonary:      Effort: Pulmonary effort is normal.      Breath sounds: Normal breath sounds. Abdominal:      General: Abdomen is flat. Palpations: Abdomen is soft. Musculoskeletal: Normal range of motion. Skin:     General: Skin is warm and dry.          Labs:  BMP:   Recent Labs     11/03/20  1937 11/03/20  2212 11/04/20  0622     --  142   K 3.7 3.3 3.7     --  109   CO2 19*  -- 20*   BUN 8  --  7   CREATININE 1.0*  --  0.8   CALCIUM 8.7  --  7.8*     CBC:   Recent Labs     11/03/20 1937 11/04/20  0622 11/05/20 0409   WBC 27.1* 24.5* 14.5*   HGB 12.1 10.2* 10.1*   HCT 36.4* 31.1* 32.1*   MCV 91.9 93.1 96.7    280 260     LIVER PROFILE:   Recent Labs     11/03/20 1937   AST 12   ALT 14   BILITOT <0.2   ALKPHOS 66     PT/INR: No results for input(s): PROTIME, INR in the last 72 hours. APTT: No results for input(s): APTT in the last 72 hours. BNP:  No results for input(s): BNP in the last 72 hours. Ionized Calcium:No results for input(s): IONCA in the last 72 hours. Magnesium:  Recent Labs     11/03/20 1937   MG 2.2     Phosphorus:No results for input(s): PHOS in the last 72 hours. HgbA1C: No results for input(s): LABA1C in the last 72 hours. Hepatic:   Recent Labs     11/03/20 1937   ALKPHOS 66   ALT 14   AST 12   PROT 7.3   BILITOT <0.2   LABALBU 4.4     Lactic Acid:   Recent Labs     11/05/20 0409   LACTA 1.1     Troponin:   Recent Labs     11/03/20 1937   CKTOTAL 155     ABGs: No results for input(s): PH, PCO2, PO2, HCO3, O2SAT in the last 72 hours. CRP:  No results for input(s): CRP in the last 72 hours. Sed Rate:  No results for input(s): SEDRATE in the last 72 hours. Cultures:   No results for input(s): CULTURE in the last 72 hours. No results for input(s): BC, [place] in the last 72 hours. No results for input(s): CXSURG in the last 72 hours. Radiology reports as per the Radiologist  Radiology: Ct Head Without Contrast    Result Date: 11/4/2020  EXAMINATION: CT HEAD WO CONTRAST 11/4/2020 12:28 PM HISTORY: Seizures. DOSE: 788 mGycm. Automatic exposure control was utilized in an effort to use as little radiation as possible, without compromising image quality. REPORT: Spiral CT of the head was performed without contrast, reconstructed coronal and sagittal images are also reviewed. COMPARISON: CT head without contrast 2/15/2017.  No intracranial hemorrhage, mass or mass effect is identified. The ventricles and basal cisterns are normal. Greatest white differentiation is normal. An \"empty\" sella is noted. Review of bone windows is unremarkable. There are mucous retention cysts within both maxillary sinuses. There is normal aeration of the mastoid air cells. No acute intracranial abnormality. A preliminary report was provided by the [facility] radiology service. There is no significant discrepancy with the preliminary report. Signed by Dr Ricky Ryan. Irina on 11/4/2020 12:39 PM    Xr Chest Portable    Result Date: 11/3/2020  XR CHEST PORTABLE 11/3/2020 7:28 PM HISTORY:   Shortness of breath  Single view. COMPARISONS:  None FINDINGS: The level inspiration is shallow and lung volumes diminished. There is crowding of bronchovascular fissure markings which may reflect the level of inspiration. There are no airspace opacities. The heart is normal in size without evidence of overt heart failure. The bony structures are intact. 1. Shallow inspiration with diminished lung volumes with perihilar bronchovascular interstitial prominence. Signed by Dr Israel Zhang on 11/3/2020 10:29 PM    Xr Chest Portable    Result Date: 11/3/2020  XR CHEST PORTABLE 11/3/2020 8:01 PM HISTORY:   Respiratory distress  Single view. COMPARISONS:  Chest radiograph performed earlier today FINDINGS: Endotracheal tube well-positioned above the simon. NG tube well-positioned with the tip in the stomach. There is bilateral perihilar bronchovascular interstitial prominence again identified. Line and support tubes well-positioned. Signed by Dr Israel Zhang on 11/3/2020 10:09 PM       Assessment     Seizure secondary to Wellbutrin overdose. No further seizure activity. Continue supportive care. Suicide attempt with Wellbutrin overdose. Psychiatry evaluation once medically stable. Hypoxemic respiratory failure. Attempt to liberate from the ventilator today.     Please document 40

## 2020-11-05 NOTE — PLAN OF CARE
Problem: Restraint Use - Nonviolent/Non-Self-Destructive Behavior:  Goal: Absence of restraint-related injury  Description: Absence of restraint-related injury  Outcome: Met This Shift     Problem: Falls - Risk of:  Goal: Will remain free from falls  Description: Will remain free from falls  Outcome: Ongoing  Goal: Absence of physical injury  Description: Absence of physical injury  Outcome: Ongoing     Problem: Skin Integrity:  Goal: Will show no infection signs and symptoms  Description: Will show no infection signs and symptoms  Outcome: Ongoing  Goal: Absence of new skin breakdown  Description: Absence of new skin breakdown  Outcome: Ongoing     Problem: Coping:  Goal: Ability to cope will improve  Description: Ability to cope will improve  Outcome: Ongoing  Goal: Ability to identify appropriate support needs will improve  Description: Ability to identify appropriate support needs will improve  Outcome: Ongoing     Problem: Health Behavior:  Goal: Ability to manage health-related needs will improve  Description: Ability to manage health-related needs will improve  Outcome: Ongoing     Problem: Physical Regulation:  Goal: Signs of adequate cerebral perfusion will increase  Description: Signs of adequate cerebral perfusion will increase  Outcome: Ongoing  Goal: Ability to maintain a stable neurologic state will improve  Description: Ability to maintain a stable neurologic state will improve  Outcome: Ongoing     Problem: Safety:  Goal: Ability to remain free from injury will improve  Description: Ability to remain free from injury will improve  Outcome: Ongoing     Problem: Self-Concept:  Goal: Level of anxiety will decrease  Description: Level of anxiety will decrease  Outcome: Ongoing  Goal: Ability to verbalize feelings about condition will improve  Description: Ability to verbalize feelings about condition will improve  Outcome: Ongoing     Problem: Urinary Elimination:  Goal: Signs and symptoms of infection will decrease  Description: Signs and symptoms of infection will decrease  Outcome: Ongoing  Goal: Complications related to the disease process, condition or treatment will be avoided or minimized  Description: Complications related to the disease process, condition or treatment will be avoided or minimized  Outcome: Ongoing     Problem: Restraint Use - Nonviolent/Non-Self-Destructive Behavior:  Goal: Absence of restraint indications  Description: Absence of restraint indications  Outcome: Not Met This Shift

## 2020-11-05 NOTE — PROGRESS NOTES
4 Eyes Skin Assessment    Farzad Harkins is being assessed upon: Admission    I agree that Leia Evans, along with Zbigniew Rivero RN (either 2 RN's or 1 LPN and 1 RN) have performed a thorough Head to Toe Skin Assessment on the patient. ALL assessment sites listed below have been assessed. Areas assessed by both nurses:     [x]   Head, Face, and Ears   [x]   Shoulders, Back, and Chest  [x]   Arms, Elbows, and Hands   [x]   Coccyx, Sacrum, and Ischium  [x]   Legs, Feet, and Heels    Does the Patient have Skin Breakdown?  No    Honorio Prevention initiated: Yes  Wound Care Orders initiated: No    WOC nurse consulted for Pressure Injury (Stage 3,4, Unstageable, DTI, NWPT, and Complex wounds) and New or Established Ostomies: No        Primary Nurse eSignature: Valerie Bernal RN on 11/4/2020 at 8:46 PM      Co-Signer eSignature: Electronically signed by Zbigniew Rivero RN on 11/4/20 at 11:57 PM CST

## 2020-11-05 NOTE — PROGRESS NOTES
Approximately 75 mL of Versed wasted and 90 mL of Propofol wasted Witnessed by Nichelle TANNER.   Electronically signed by Gisel Herring RN on 11/5/2020 at 2:49 PM    Electronically signed by Nichelle Mcnair RN on 11/5/2020 at 2:44 PM

## 2020-11-05 NOTE — PROGRESS NOTES
Tee Valdovinos transferred to [facility] from [facility] via wheelchair. Reason for transfer: hemodynamically stable, no longer requiring continuous ICU monitoring   Explained reason for transfer to Patient and Family. Belongings: Upper dentures with patient at bedside . Soft chart transferred with patient: Yes. Telemetry box number transferred with patient: no, no orders  Report given to: Liseth Moses, via telephone.       Electronically signed by Clay Foy RN on 11/5/2020 at 2:05 PM

## 2020-11-05 NOTE — PROCEDURES
[facility] [facility] [address] [facility]                          ELECTROENCEPHALOGRAM REPORT    PATIENT NAME: Cely Bates                     :        1990  MED REC NO:   [id number]                              ROOM:       [facility]  ACCOUNT NO:   [de-identified]                           ADMIT DATE: 2020  PROVIDER:     Kenzie Mcknight MD    DATE OF EE2020    SUMMARY:  The record consists of continuous diffuse beta activity. The  patient is on propofol, Versed and fentanyl. No focal slowing nor any  epileptiform activity is noted. IMPRESSION:  EEG consistent with drug effect from medication. No  epileptiform activity noted. Consider follow-up EEG without sedating  medications if clinically indicated.         Ata Soto MD    D: 2020 9:19:01      T: 2020 9:29:43     [email or web address]  Job#: [id number]     Doc#: [id number]    CC:

## 2020-11-05 NOTE — PROGRESS NOTES
Antigravity x 4     Sensory function  unable to assess     Cerebellar  unable to assess     Tremor None present     Gait                  Not tested           Nursing/pcp notes, imaging,labs and vitals reviewed. PT,OT and/or speech notes reviewed    Lab Results   Component Value Date    WBC 14.5 (H) 11/05/2020    HGB 10.1 (L) 11/05/2020    HCT 32.1 (L) 11/05/2020    MCV 96.7 11/05/2020     11/05/2020     Lab Results   Component Value Date     11/04/2020    K 3.7 11/04/2020     11/04/2020    CO2 20 (L) 11/04/2020    BUN 7 11/04/2020    CREATININE 0.8 11/04/2020    GLUCOSE 114 (H) 11/04/2020    CALCIUM 7.8 (L) 11/04/2020    PROT 7.3 11/03/2020    LABALBU 4.4 11/03/2020    BILITOT <0.2 11/03/2020    ALKPHOS 66 11/03/2020    AST 12 11/03/2020    ALT 14 11/03/2020    LABGLOM >60 11/04/2020    GFRAA >59 11/04/2020    GLOB 2.6 02/16/2017   No results found for: INRNo results found for: PHENYTOIN, ESR, CRP          RECORD REVIEW: Previous medical records, medications were reviewed at today's visit    IMPRESSION:   Presumably new onset seizures in a patient with a Wellbutrin overdose. The former is likely caused by the latter. No further seizure activity noted. EEG consistent with sedating drugs. Hopefully can wean from ventilator today. No need for prophylactic anticonvulsants at this time. Will follow with you.

## 2020-11-05 NOTE — PROGRESS NOTES
Medication list verified and updated.   Electronically signed by Nina Arnett RN on 11/5/2020 at 12:23 PM

## 2020-11-05 NOTE — PROGRESS NOTES
Wellbutrin bottles given to patients father to send home  Electronically signed by Wing Keenan RN on 11/5/2020 at 11:50 AM

## 2020-11-05 NOTE — PROGRESS NOTES
NG tube discontinued per Dr Abdiaziz Sanderson orders  Electronically signed by Maryjane Baltazar RN on 11/5/2020 at 12:16 PM

## 2020-11-06 PROCEDURE — 99253 IP/OBS CNSLTJ NEW/EST LOW 45: CPT | Performed by: PSYCHIATRY & NEUROLOGY

## 2020-11-06 PROCEDURE — 6360000002 HC RX W HCPCS: Performed by: INTERNAL MEDICINE

## 2020-11-06 PROCEDURE — 2580000003 HC RX 258: Performed by: INTERNAL MEDICINE

## 2020-11-06 PROCEDURE — 99232 SBSQ HOSP IP/OBS MODERATE 35: CPT | Performed by: PSYCHIATRY & NEUROLOGY

## 2020-11-06 PROCEDURE — 6370000000 HC RX 637 (ALT 250 FOR IP): Performed by: INTERNAL MEDICINE

## 2020-11-06 PROCEDURE — 1210000000 HC MED SURG R&B

## 2020-11-06 PROCEDURE — C9113 INJ PANTOPRAZOLE SODIUM, VIA: HCPCS | Performed by: INTERNAL MEDICINE

## 2020-11-06 PROCEDURE — 6370000000 HC RX 637 (ALT 250 FOR IP): Performed by: HOSPITALIST

## 2020-11-06 RX ORDER — SODIUM CHLORIDE 9 MG/ML
INJECTION, SOLUTION INTRAVENOUS CONTINUOUS
Status: DISCONTINUED | OUTPATIENT
Start: 2020-11-06 | End: 2020-11-07 | Stop reason: HOSPADM

## 2020-11-06 RX ORDER — PANTOPRAZOLE SODIUM 40 MG/10ML
40 INJECTION, POWDER, LYOPHILIZED, FOR SOLUTION INTRAVENOUS DAILY
Status: DISCONTINUED | OUTPATIENT
Start: 2020-11-06 | End: 2020-11-07 | Stop reason: HOSPADM

## 2020-11-06 RX ORDER — SODIUM CHLORIDE 9 MG/ML
10 INJECTION INTRAVENOUS DAILY
Status: DISCONTINUED | OUTPATIENT
Start: 2020-11-06 | End: 2020-11-07 | Stop reason: HOSPADM

## 2020-11-06 RX ORDER — NICOTINE 21 MG/24HR
1 PATCH, TRANSDERMAL 24 HOURS TRANSDERMAL DAILY
Status: DISCONTINUED | OUTPATIENT
Start: 2020-11-06 | End: 2020-11-07 | Stop reason: HOSPADM

## 2020-11-06 RX ADMIN — SODIUM CHLORIDE, PRESERVATIVE FREE 10 ML: 5 INJECTION INTRAVENOUS at 08:03

## 2020-11-06 RX ADMIN — SODIUM CHLORIDE: 9 INJECTION, SOLUTION INTRAVENOUS at 20:28

## 2020-11-06 RX ADMIN — ACETAMINOPHEN 650 MG: 325 TABLET ORAL at 23:26

## 2020-11-06 RX ADMIN — SODIUM CHLORIDE, PRESERVATIVE FREE 10 ML: 5 INJECTION INTRAVENOUS at 11:15

## 2020-11-06 RX ADMIN — ENOXAPARIN SODIUM 40 MG: 40 INJECTION SUBCUTANEOUS at 08:30

## 2020-11-06 RX ADMIN — BENZOCAINE 6 MG-MENTHOL 10 MG LOZENGES 1 LOZENGE: at 23:28

## 2020-11-06 RX ADMIN — HYDROCODONE BITARTRATE AND ACETAMINOPHEN 1 TABLET: 5; 325 TABLET ORAL at 20:29

## 2020-11-06 RX ADMIN — ACETAMINOPHEN 650 MG: 325 TABLET ORAL at 12:16

## 2020-11-06 RX ADMIN — HYDROCODONE BITARTRATE AND ACETAMINOPHEN 1 TABLET: 5; 325 TABLET ORAL at 14:31

## 2020-11-06 RX ADMIN — HYDROCODONE BITARTRATE AND ACETAMINOPHEN 1 TABLET: 5; 325 TABLET ORAL at 01:37

## 2020-11-06 RX ADMIN — PANTOPRAZOLE SODIUM 40 MG: 40 INJECTION, POWDER, FOR SOLUTION INTRAVENOUS at 11:15

## 2020-11-06 RX ADMIN — SODIUM CHLORIDE: 9 INJECTION, SOLUTION INTRAVENOUS at 11:06

## 2020-11-06 RX ADMIN — HYDROCODONE BITARTRATE AND ACETAMINOPHEN 1 TABLET: 5; 325 TABLET ORAL at 08:30

## 2020-11-06 RX ADMIN — BENZOCAINE 6 MG-MENTHOL 10 MG LOZENGES 1 LOZENGE: at 08:06

## 2020-11-06 ASSESSMENT — PAIN DESCRIPTION - ONSET
ONSET: ON-GOING
ONSET: ON-GOING

## 2020-11-06 ASSESSMENT — PAIN DESCRIPTION - FREQUENCY
FREQUENCY: CONTINUOUS

## 2020-11-06 ASSESSMENT — PAIN DESCRIPTION - PAIN TYPE
TYPE: ACUTE PAIN

## 2020-11-06 ASSESSMENT — PAIN DESCRIPTION - ORIENTATION
ORIENTATION: OTHER (COMMENT)
ORIENTATION: OTHER (COMMENT)

## 2020-11-06 ASSESSMENT — PAIN DESCRIPTION - LOCATION
LOCATION: THROAT

## 2020-11-06 ASSESSMENT — PAIN - FUNCTIONAL ASSESSMENT
PAIN_FUNCTIONAL_ASSESSMENT: ACTIVITIES ARE NOT PREVENTED
PAIN_FUNCTIONAL_ASSESSMENT: ACTIVITIES ARE NOT PREVENTED

## 2020-11-06 ASSESSMENT — PAIN DESCRIPTION - PROGRESSION
CLINICAL_PROGRESSION: NOT CHANGED
CLINICAL_PROGRESSION: NOT CHANGED

## 2020-11-06 ASSESSMENT — PAIN SCALES - GENERAL
PAINLEVEL_OUTOF10: 7
PAINLEVEL_OUTOF10: 8
PAINLEVEL_OUTOF10: 7
PAINLEVEL_OUTOF10: 0
PAINLEVEL_OUTOF10: 7
PAINLEVEL_OUTOF10: 8
PAINLEVEL_OUTOF10: 7
PAINLEVEL_OUTOF10: 7
PAINLEVEL_OUTOF10: 0

## 2020-11-06 ASSESSMENT — PAIN DESCRIPTION - DESCRIPTORS
DESCRIPTORS: ACHING;SORE

## 2020-11-06 ASSESSMENT — PAIN DESCRIPTION - DIRECTION
RADIATING_TOWARDS: NO
RADIATING_TOWARDS: NO

## 2020-11-06 ASSESSMENT — PAIN SCALES - WONG BAKER
WONGBAKER_NUMERICALRESPONSE: 0

## 2020-11-06 NOTE — CONSULTS
[facility]  Psychiatry Consult    Reason for Consult: overdose    78-year-old white female with history of schizoaffective disorder and ADHD, admitted status post overdose on Wellbutrin. She reportedly took 8600 mg of Wellbutrin after which she became confused and was hallucinating. She had seizures on her way to the ED. In the ED she was intubated and heavily sedated. No more seizures reported since then. UDS positive for amphetamine (may be FP due to Wellbutrin), BAL <10. CT head with no acute changes. EEG showed no epileptiform activity. Patient is observed resting in bed this morning. She is calm and cooperative during the exam.  Appears guarded and is a poor historian. States she does not remember taking the pills. \"Last thing I remember is going to bed. \"  Denies suicide attempt. Patient denies suicidal ideation at this time. States she is not suicidal at home. Admits to being depressed in the setting of her social stressors. She shares custody of her son with her ex. She is unemployed and looking for a job. She is staying with her parents. She denies mood swings and racing thoughts. She denies auditory and visual hallucinations. She reports poor sleep. She [place] in [place]. States she was planning to see a psychiatrist for med management - \"I need to get my Adderall. I have trouble concentrating\". Patient gave us permission to call her parents for collateral.    Psychiatric History:    Diagnoses: Schizoaffective disorder  Suicide attempts/gestures: wrist cutting in 2012  Prior hospitalizations: [facility] - [facility], 2012  Medication trials: Risperdal, Seroquel, Wellbutrin, Adderall  Mental health contact: seeing a counselor  Head trauma: Denies    Substance Use History:  History of alcohol abuse. History of methamphetamine use and pain medication abuse. States she has been sober for 7 years and was taken off of methadone 4 months ago.      Allergies: minimizing her overdose. Considering the circumstances and previous history of suicide attempt, we recommend inpatient psychiatric treatment. Please transfer to psychiatry once medically cleared. Thank you for consulting our service. Please call us with questions.       Yohannes Connelly MD

## 2020-11-06 NOTE — PROGRESS NOTES
talking to herself over the last few months and has been \"getting louder with it\". Father reports \"my daughter needs help\".     Electronically signed by Yuni Patel RN on 11/6/2020 at 10:04 AM

## 2020-11-06 NOTE — PROGRESS NOTES
Patient:   Gwen Bender  MR#:    [id number]   Room:    0412/412-02   YOB: 1990  Date of Progress Note: 11/6/2020  Time of Note                           9:34 AM  Consulting Physician:   Johnna Alex M.D. Attending Physician:  Patrick Pena MD     CHIEF COMPLAINT: Drug overdose and seizures  ? Subjective  This 31 y.o. female who presents with drug overdose and seizures. Apparently took a large amount of Wellbutrin in a suicide attempt and was said to be confused, weak, hallucinating and had 1 or more seizures on her way to the ED but none since then. Had gastric lavage. Successfully extubated yesterday. No further seizures. Appears baseline neurologically. REVIEW OF SYSTEMS:  Unable to obtain as patient is intubated      PHYSICAL EXAM:  /70   Pulse 101   Temp 98.4 °F (36.9 °C)   Resp 16   Ht 5' 10\" (1.778 m)   Wt 209 lb 4.8 oz (94.9 kg)   SpO2 98%   BMI 30.03 kg/m²     Constitutional: she appears well-developed and well-nourished. Eyes - conjunctiva normal.  Pupils react to light  Ear, nose, throat -hearing intact to voice. No scars, masses, or lesions over external nose or ears, no atrophy of tongue  Neck-symmetric, no masses noted, no jugular vein distension  Respiration- chest wall appears symmetric, good expansion,   normal effort without use of accessory muscles  Cardiovascular- RRR  Musculoskeletal - no significant wasting of muscles noted, no bony deformities, gait no gross ataxia  Extremities-no clubbing, cyanosis or edema  Skin - warm, dry, and intact. No rash, erythema, or pallor. Psychiatric - mood, affect, and behavior appear normal.      Neurology  NEUROLOGICAL EXAM:      Mental status     Awake alert and oriented x3. Following all complex commands     Cranial Nerves   2 through 12 grossly intact. Pupils equal.  Extraocular muscle movements intact. Has a symmetric facial grimace.    Motor function  Antigravity x 4     Sensory function  intact     Cerebellar no ataxia noted     Tremor None present     Gait                  Not tested           Nursing/pcp notes, imaging,labs and vitals reviewed. PT,OT and/or speech notes reviewed    Lab Results   Component Value Date    WBC 14.5 (H) 11/05/2020    HGB 10.1 (L) 11/05/2020    HCT 32.1 (L) 11/05/2020    MCV 96.7 11/05/2020     11/05/2020     Lab Results   Component Value Date     11/04/2020    K 3.7 11/04/2020     11/04/2020    CO2 20 (L) 11/04/2020    BUN 7 11/04/2020    CREATININE 0.8 11/04/2020    GLUCOSE 114 (H) 11/04/2020    CALCIUM 7.8 (L) 11/04/2020    PROT 7.3 11/03/2020    LABALBU 4.4 11/03/2020    BILITOT <0.2 11/03/2020    ALKPHOS 66 11/03/2020    AST 12 11/03/2020    ALT 14 11/03/2020    LABGLOM >60 11/04/2020    GFRAA >59 11/04/2020    GLOB 2.6 02/16/2017   No results found for: INRNo results found for: PHENYTOIN, ESR, CRP          RECORD REVIEW: Previous medical records, medications were reviewed at today's visit    IMPRESSION:   Presumably new onset seizures in a patient with a Wellbutrin overdose. The former is likely caused by the latter. No further seizure activity noted. EEG consistent with sedating drugs. No driving for 3 months, as per [employer]. No further neurological recommendations. Please recontact if needed.

## 2020-11-06 NOTE — PLAN OF CARE
minimized  Description: Complications related to the disease process, condition or treatment will be avoided or minimized  Outcome: Ongoing

## 2020-11-06 NOTE — PROGRESS NOTES
[facility]        Hospitalist Progress Note  11/6/2020 10:34 AM  Subjective:   Admit Date: 11/3/2020  PCP: Corinna Pagan MD    Chief Complaint: Found down, seizures, drug overdose on Wellbutrin    Subjective: Patient seen and examined at bedside with sitter present. Complaining of a sore throat. Denies chest pain or shortness of breath. Cumulative Hospital History: 79-year-old female with a past medical history of obesity, ADHD, depression, anxiety, schizophrenia brought to the hospital after she was found down having seizures with Wellbutrin bottle beside her. Patient initially required intubation and mechanical ventilation with ICU level care with neurology consulted with a EEG performed. Seizure-like activity likely secondary to Wellbutrin overdose. Patient was successfully liberated from the ventilator and transferred to routine medical floors. Patient with no further seizure-like activity. Poison control was consulted on arrival.  Psychiatry consulted and patient will be transferred to inpatient psychiatry once medically cleared. ROS: 14 point review of systems is negative except as specifically addressed above. DIET GENERAL;     Intake/Output Summary (Last 24 hours) at 11/6/2020 1034  Last data filed at 11/5/2020 2019  Gross per 24 hour   Intake 1142.7 ml   Output 880 ml   Net 262.7 ml     Medications:   sodium chloride       Current Facility-Administered Medications   Medication Dose Route Frequency Provider Last Rate Last Dose    nicotine (NICODERM CQ) 21 MG/24HR 1 patch  1 patch Transdermal Daily Gamaliel Vaca MD   1 patch at 11/06/20 0210    0.9 % sodium chloride infusion   Intravenous Continuous Rui Gar MD        phenol 1.4 % mouth spray 1 spray  1 spray Mouth/Throat Q2H PRN Rui Gar MD        pantoprazole (PROTONIX) injection 40 mg  40 mg Intravenous Daily Rui Gar MD        And    sodium chloride (PF) 0.9 % injection 10 mL  10 mL Intravenous Daily Lyndon Esquivel MD        HYDROcodone-acetaminophen [facility]) 5-325 MG per tablet 1 tablet  1 tablet Oral Q6H PRN Stanley Rodriguez, DO   1 tablet at 11/06/20 6104    Benzocaine-Menthol (CEPACOL) 1 lozenge  1 lozenge Oral Q2H PRN Stanley Rodriguez, DO   1 lozenge at 11/06/20 1278    sodium chloride flush 0.9 % injection 10 mL  10 mL Intravenous 2 times per day Stanley Rodriguez, DO   10 mL at 11/06/20 1912    sodium chloride flush 0.9 % injection 10 mL  10 mL Intravenous PRN Stanley Rodriguez, DO        acetaminophen (TYLENOL) tablet 650 mg  650 mg Oral Q6H PRN Stanley Rodriguez, DO        Or    acetaminophen (TYLENOL) suppository 650 mg  650 mg Rectal Q6H PRN Stanley Rodriguez, DO        polyethylene glycol [facility]) packet 17 g  17 g Oral Daily PRN Stanley Rodriguez, DO        promethazine (PHENERGAN) tablet 12.5 mg  12.5 mg Oral Q6H PRN Stanley Rodriguez, DO        Or    ondansetron [facility]) injection 4 mg  4 mg Intravenous Q6H PRN Stanley Rodriguez, DO        enoxaparin (LOVENOX) injection 40 mg  40 mg Subcutaneous Daily Stanley Rodriguez, DO   40 mg at 11/06/20 0830        Labs:     Recent Labs     11/03/20 1937 11/04/20  0622 11/05/20  0409   WBC 27.1* 24.5* 14.5*   RBC 3.96* 3.34* 3.32*   HGB 12.1 10.2* 10.1*   HCT 36.4* 31.1* 32.1*   MCV 91.9 93.1 96.7   MCH 30.6 30.5 30.4   MCHC 33.2 32.8* 31.5*    280 260     Recent Labs     11/03/20 1937 11/03/20  2212 11/04/20  0622     --  142   K 3.7 3.3 3.7   ANIONGAP 19  --  13     --  109   CO2 19*  --  20*   BUN 8  --  7   CREATININE 1.0*  --  0.8   GLUCOSE 152*  --  114*   CALCIUM 8.7  --  7.8*     Recent Labs     11/03/20 1937   MG 2.2     Recent Labs     11/03/20 1937   AST 12   ALT 14   BILITOT <0.2   ALKPHOS 66     ABGs:No results for input(s): PH, PO2, PCO2, HCO3, BE, O2SAT in the last 72 hours.   Troponin T:   Recent Labs     11/03/20 1937   TROPONINI <0.01     INR: No results for input(s): INR in the last 72

## 2020-11-07 ENCOUNTER — HOSPITAL ENCOUNTER (INPATIENT)
Age: 30
LOS: 3 days | Discharge: HOME | DRG: 751 | End: 2020-11-10
Attending: PSYCHIATRY & NEUROLOGY | Admitting: PSYCHIATRY & NEUROLOGY
Payer: MEDICAID

## 2020-11-07 VITALS
SYSTOLIC BLOOD PRESSURE: 160 MMHG | DIASTOLIC BLOOD PRESSURE: 91 MMHG | WEIGHT: 209.3 LBS | BODY MASS INDEX: 29.96 KG/M2 | TEMPERATURE: 98.5 F | OXYGEN SATURATION: 98 % | HEART RATE: 79 BPM | RESPIRATION RATE: 18 BRPM | HEIGHT: 70 IN

## 2020-11-07 PROBLEM — T50.902A SUICIDAL OVERDOSE, INITIAL ENCOUNTER (HCC): Status: ACTIVE | Noted: 2020-11-07

## 2020-11-07 PROBLEM — F32.A DEPRESSION: Status: ACTIVE | Noted: 2020-11-07

## 2020-11-07 LAB
ALBUMIN SERPL-MCNC: 3.3 G/DL (ref 3.5–5.2)
ALP BLD-CCNC: 65 U/L (ref 35–104)
ALT SERPL-CCNC: 22 U/L (ref 5–33)
ANION GAP SERPL CALCULATED.3IONS-SCNC: 13 MMOL/L (ref 7–19)
AST SERPL-CCNC: 25 U/L (ref 5–32)
BASOPHILS ABSOLUTE: 0 K/UL (ref 0–0.2)
BASOPHILS RELATIVE PERCENT: 0.2 % (ref 0–1)
BILIRUB SERPL-MCNC: 0.4 MG/DL (ref 0.2–1.2)
BUN BLDV-MCNC: 2 MG/DL (ref 6–20)
CALCIUM SERPL-MCNC: 8.1 MG/DL (ref 8.6–10)
CHLORIDE BLD-SCNC: 110 MMOL/L (ref 98–111)
CO2: 20 MMOL/L (ref 22–29)
CREAT SERPL-MCNC: 0.6 MG/DL (ref 0.5–0.9)
EOSINOPHILS ABSOLUTE: 0.1 K/UL (ref 0–0.6)
EOSINOPHILS RELATIVE PERCENT: 0.7 % (ref 0–5)
GFR AFRICAN AMERICAN: >59
GFR NON-AFRICAN AMERICAN: >60
GLUCOSE BLD-MCNC: 90 MG/DL (ref 74–109)
HCT VFR BLD CALC: 30.3 % (ref 37–47)
HEMOGLOBIN: 10 G/DL (ref 12–16)
IMMATURE GRANULOCYTES #: 0.1 K/UL
LYMPHOCYTES ABSOLUTE: 3 K/UL (ref 1.1–4.5)
LYMPHOCYTES RELATIVE PERCENT: 21.4 % (ref 20–40)
MAGNESIUM: 1.6 MG/DL (ref 1.6–2.6)
MCH RBC QN AUTO: 30.5 PG (ref 27–31)
MCHC RBC AUTO-ENTMCNC: 33 G/DL (ref 33–37)
MCV RBC AUTO: 92.4 FL (ref 81–99)
MONOCYTES ABSOLUTE: 0.9 K/UL (ref 0–0.9)
MONOCYTES RELATIVE PERCENT: 6.7 % (ref 0–10)
NEUTROPHILS ABSOLUTE: 9.9 K/UL (ref 1.5–7.5)
NEUTROPHILS RELATIVE PERCENT: 70.5 % (ref 50–65)
PDW BLD-RTO: 13.7 % (ref 11.5–14.5)
PLATELET # BLD: 257 K/UL (ref 130–400)
PMV BLD AUTO: 10.1 FL (ref 9.4–12.3)
POTASSIUM SERPL-SCNC: 3.9 MMOL/L (ref 3.5–5)
RBC # BLD: 3.28 M/UL (ref 4.2–5.4)
SARS-COV-2, NAAT: NOT DETECTED
SODIUM BLD-SCNC: 143 MMOL/L (ref 136–145)
TOTAL PROTEIN: 5.4 G/DL (ref 6.6–8.7)
WBC # BLD: 14 K/UL (ref 4.8–10.8)

## 2020-11-07 PROCEDURE — 36415 COLL VENOUS BLD VENIPUNCTURE: CPT

## 2020-11-07 PROCEDURE — 80053 COMPREHEN METABOLIC PANEL: CPT

## 2020-11-07 PROCEDURE — 6370000000 HC RX 637 (ALT 250 FOR IP): Performed by: HOSPITALIST

## 2020-11-07 PROCEDURE — 2580000003 HC RX 258: Performed by: INTERNAL MEDICINE

## 2020-11-07 PROCEDURE — 6370000000 HC RX 637 (ALT 250 FOR IP): Performed by: INTERNAL MEDICINE

## 2020-11-07 PROCEDURE — U0002 COVID-19 LAB TEST NON-CDC: HCPCS

## 2020-11-07 PROCEDURE — 6360000002 HC RX W HCPCS: Performed by: INTERNAL MEDICINE

## 2020-11-07 PROCEDURE — 1240000000 HC EMOTIONAL WELLNESS R&B

## 2020-11-07 PROCEDURE — 99223 1ST HOSP IP/OBS HIGH 75: CPT | Performed by: PSYCHIATRY & NEUROLOGY

## 2020-11-07 PROCEDURE — 83735 ASSAY OF MAGNESIUM: CPT

## 2020-11-07 PROCEDURE — C9113 INJ PANTOPRAZOLE SODIUM, VIA: HCPCS | Performed by: INTERNAL MEDICINE

## 2020-11-07 PROCEDURE — 6370000000 HC RX 637 (ALT 250 FOR IP): Performed by: PSYCHIATRY & NEUROLOGY

## 2020-11-07 PROCEDURE — 85025 COMPLETE CBC W/AUTO DIFF WBC: CPT

## 2020-11-07 RX ORDER — POLYETHYLENE GLYCOL 3350 17 G/17G
17 POWDER, FOR SOLUTION ORAL DAILY PRN
Status: CANCELLED | OUTPATIENT
Start: 2020-11-07

## 2020-11-07 RX ORDER — TRAZODONE HYDROCHLORIDE 50 MG/1
50 TABLET ORAL NIGHTLY
Status: DISCONTINUED | OUTPATIENT
Start: 2020-11-07 | End: 2020-11-10 | Stop reason: HOSPADM

## 2020-11-07 RX ORDER — ACETAMINOPHEN 325 MG/1
650 TABLET ORAL EVERY 6 HOURS PRN
Status: CANCELLED | OUTPATIENT
Start: 2020-11-07

## 2020-11-07 RX ORDER — HYDROCODONE BITARTRATE AND ACETAMINOPHEN 5; 325 MG/1; MG/1
1 TABLET ORAL EVERY 6 HOURS PRN
Status: CANCELLED | OUTPATIENT
Start: 2020-11-07

## 2020-11-07 RX ORDER — ONDANSETRON 2 MG/ML
4 INJECTION INTRAMUSCULAR; INTRAVENOUS EVERY 6 HOURS PRN
Status: CANCELLED | OUTPATIENT
Start: 2020-11-07

## 2020-11-07 RX ORDER — NICOTINE 21 MG/24HR
1 PATCH, TRANSDERMAL 24 HOURS TRANSDERMAL DAILY
Status: DISCONTINUED | OUTPATIENT
Start: 2020-11-07 | End: 2020-11-10 | Stop reason: HOSPADM

## 2020-11-07 RX ORDER — ACETAMINOPHEN 325 MG/1
650 TABLET ORAL EVERY 4 HOURS PRN
Status: DISCONTINUED | OUTPATIENT
Start: 2020-11-07 | End: 2020-11-10 | Stop reason: HOSPADM

## 2020-11-07 RX ORDER — ACETAMINOPHEN 650 MG/1
650 SUPPOSITORY RECTAL EVERY 6 HOURS PRN
Status: CANCELLED | OUTPATIENT
Start: 2020-11-07

## 2020-11-07 RX ORDER — PROMETHAZINE HYDROCHLORIDE 12.5 MG/1
12.5 TABLET ORAL EVERY 6 HOURS PRN
Status: CANCELLED | OUTPATIENT
Start: 2020-11-07

## 2020-11-07 RX ORDER — NICOTINE 21 MG/24HR
1 PATCH, TRANSDERMAL 24 HOURS TRANSDERMAL DAILY
Status: DISCONTINUED | OUTPATIENT
Start: 2020-11-07 | End: 2020-11-07

## 2020-11-07 RX ORDER — NICOTINE 21 MG/24HR
1 PATCH, TRANSDERMAL 24 HOURS TRANSDERMAL DAILY
Status: CANCELLED | OUTPATIENT
Start: 2020-11-07

## 2020-11-07 RX ADMIN — SODIUM CHLORIDE, PRESERVATIVE FREE 10 ML: 5 INJECTION INTRAVENOUS at 09:05

## 2020-11-07 RX ADMIN — SODIUM CHLORIDE: 9 INJECTION, SOLUTION INTRAVENOUS at 05:14

## 2020-11-07 RX ADMIN — HYDROCODONE BITARTRATE AND ACETAMINOPHEN 1 TABLET: 5; 325 TABLET ORAL at 02:41

## 2020-11-07 RX ADMIN — TRAZODONE HYDROCHLORIDE 50 MG: 50 TABLET ORAL at 20:59

## 2020-11-07 RX ADMIN — PANTOPRAZOLE SODIUM 40 MG: 40 INJECTION, POWDER, FOR SOLUTION INTRAVENOUS at 09:05

## 2020-11-07 RX ADMIN — SODIUM CHLORIDE, PRESERVATIVE FREE 10 ML: 5 INJECTION INTRAVENOUS at 09:06

## 2020-11-07 RX ADMIN — ACETAMINOPHEN 650 MG: 325 TABLET ORAL at 05:33

## 2020-11-07 RX ADMIN — TRAZODONE HYDROCHLORIDE 50 MG: 50 TABLET ORAL at 22:36

## 2020-11-07 ASSESSMENT — PAIN DESCRIPTION - DIRECTION
RADIATING_TOWARDS: NO
RADIATING_TOWARDS: NO

## 2020-11-07 ASSESSMENT — PAIN DESCRIPTION - DESCRIPTORS
DESCRIPTORS: ACHING;SORE
DESCRIPTORS: ACHING;SORE

## 2020-11-07 ASSESSMENT — LIFESTYLE VARIABLES: HISTORY_ALCOHOL_USE: NO

## 2020-11-07 ASSESSMENT — PAIN DESCRIPTION - PROGRESSION
CLINICAL_PROGRESSION: NOT CHANGED

## 2020-11-07 ASSESSMENT — PAIN DESCRIPTION - PAIN TYPE
TYPE: ACUTE PAIN
TYPE: ACUTE PAIN

## 2020-11-07 ASSESSMENT — PAIN SCALES - GENERAL
PAINLEVEL_OUTOF10: 0
PAINLEVEL_OUTOF10: 7
PAINLEVEL_OUTOF10: 0
PAINLEVEL_OUTOF10: 6
PAINLEVEL_OUTOF10: 6
PAINLEVEL_OUTOF10: 0

## 2020-11-07 ASSESSMENT — PAIN SCALES - WONG BAKER
WONGBAKER_NUMERICALRESPONSE: 0

## 2020-11-07 ASSESSMENT — SLEEP AND FATIGUE QUESTIONNAIRES
SLEEP PATTERN: DIFFICULTY FALLING ASLEEP;RESTLESSNESS
DIFFICULTY FALLING ASLEEP: YES
DO YOU HAVE DIFFICULTY SLEEPING: YES
AVERAGE NUMBER OF SLEEP HOURS: 6
DIFFICULTY STAYING ASLEEP: YES
DIFFICULTY ARISING: NO
DO YOU USE A SLEEP AID: YES
RESTFUL SLEEP: NO

## 2020-11-07 ASSESSMENT — PAIN DESCRIPTION - ONSET
ONSET: ON-GOING
ONSET: ON-GOING

## 2020-11-07 ASSESSMENT — PAIN DESCRIPTION - FREQUENCY
FREQUENCY: CONTINUOUS
FREQUENCY: CONTINUOUS

## 2020-11-07 ASSESSMENT — PAIN DESCRIPTION - LOCATION
LOCATION: THROAT
LOCATION: THROAT

## 2020-11-07 ASSESSMENT — PAIN DESCRIPTION - ORIENTATION
ORIENTATION: OTHER (COMMENT)
ORIENTATION: OTHER (COMMENT)

## 2020-11-07 NOTE — DISCHARGE SUMMARY
Discharge Summary - Transfer to inpatient psychiatry      Coco Mann  :  1990  MRN:  [id number]    Admit date:  11/3/2020  Discharge date:      Admitting Physician:  Gino Rome MD    Advance Directive: Full Code    Consults: psychiatry and neurology    Primary Care Physician:  Windy Councilman, MD    Discharge Diagnoses:  Principal Problem:    Drug overdose  Active Problems:    Seizure [facility])  Resolved Problems:    * No resolved hospital problems. *      Significant Diagnostic Studies:   Ct Head Without Contrast    Result Date: 2020  EXAMINATION: CT HEAD WO CONTRAST 2020 12:28 PM HISTORY: Seizures. DOSE: 788 mGycm. Automatic exposure control was utilized in an effort to use as little radiation as possible, without compromising image quality. REPORT: Spiral CT of the head was performed without contrast, reconstructed coronal and sagittal images are also reviewed. COMPARISON: CT head without contrast 2/15/2017. No intracranial hemorrhage, mass or mass effect is identified. The ventricles and basal cisterns are normal. Greatest white differentiation is normal. An \"empty\" sella is noted. Review of bone windows is unremarkable. There are mucous retention cysts within both maxillary sinuses. There is normal aeration of the mastoid air cells. No acute intracranial abnormality. A preliminary report was provided by the [facility] radiology service. There is no significant discrepancy with the preliminary report. Signed by Dr Jadyn Arevalo on 2020 12:39 PM    Xr Chest Portable    Result Date: 11/3/2020  XR CHEST PORTABLE 11/3/2020 7:28 PM HISTORY:   Shortness of breath  Single view. COMPARISONS:  None FINDINGS: The level inspiration is shallow and lung volumes diminished. There is crowding of bronchovascular fissure markings which may reflect the level of inspiration. There are no airspace opacities. The heart is normal in size without evidence of overt heart failure.  The bony structures PCP and psychiatry as an outpatient. Physical Exam:  Vitals: /82   Pulse 84   Temp 98 °F (36.7 °C) (Temporal)   Resp 16   Ht 5' 10\" (1.778 m)   Wt 209 lb 4.8 oz (94.9 kg)   SpO2 98%   BMI 30.03 kg/m²   24HR INTAKE/OUTPUT:      Intake/Output Summary (Last 24 hours) at 11/7/2020 7181  Last data filed at 11/7/2020 0346  Gross per 24 hour   Intake 2508.75 ml   Output --   Net 2508.75 ml     General appearance: alert and cooperative with exam  HEENT: AT/NC  Lungs: no increased work of breathing, BLAE  Heart: RRR  Abdomen: BS+, soft, NT, ND  Extremities: no edema  Neurologic: Alert, gross motor and sensory function intact  Skin: warm    Discharge Medications:       Jenna Villarreal   Home Medication Instructions HDY:[id number]    Printed on:11/07/20 0839   Medication Information                      nicotine (NICODERM CQ) 21 MG/24HR  Place 1 patch onto the skin every 24 hours             ondansetron (ZOFRAN) 4 MG tablet  Take 2 tablets by mouth every 8 hours as needed for Nausea or Vomiting                 Discharge Instructions: Follow up with Suly Noel MD in 7 days. Take medications as directed. Resume activity as tolerated. Diet: DIET GENERAL;     Disposition: Patient is medically stable and will be discharged to inpatient psychiatry. Time spent on discharge 35 minutes.     Signed:  Bernard Norton MD 11/7/2020 8:39 AM

## 2020-11-07 NOTE — PROGRESS NOTES
`[facility]  Admission Note     Admission Type:   Admission Type: Voluntary    Reason for admission:  Reason for Admission: Patient's mother found patient at home and was having trouble walking, fell in the floor, was hallucinating and then father came home found the empty bottle of wellbutrin and called the ambulance. She had seizures upon arriving to the hospital per father.     PATIENT STRENGTHS:  Strengths: Positive Support, Connection to output provider, No significant Physical Illness    Patient Strengths and Limitations:  Limitations: Lacks leisure interests, Multiple barriers to leisure interests, Tendency to isolate self, Inappropriate/potentially harmful leisure interests    Addictive Behavior:   Addictive Behavior  Do you have a history of Chemical Use?: Yes  Do you have a history of Alcohol Use?: No  Do you have a history of Street Drug Abuse?: Yes  Histroy of Prescripton Drug Abuse?: Yes    Medical Problems:   Past Medical History:   Diagnosis Date    ADHD (attention deficit hyperactivity disorder)     Anxiety     Depression     History of narcotic addiction ([facility]     Schizophrenia ([facility]     Substance abuse ([facility]        Status EXAM:  Status and Exam  Normal: No  Facial Expression: Avoids Gaze, Worried, Sad  Affect: Appropriate, Congruent  Level of Consciousness: Alert  Mood:Normal: No  Mood: Depressed, Anxious, Sad, Ashamed/humiliated, Helpless  Motor Activity:Normal: Yes  Interview Behavior: Cooperative  Preception: Leonard to Person, Leonard to Time, Leonard to Place, Leonard to Situation  Attention:Normal: Yes  Thought Processes: Circumstantial  Thought Content:Normal: No  Thought Content: Preoccupations  Hallucinations: None  Delusions: No  Memory:Normal: No  Memory: Poor Recent  Insight and Judgment: No  Insight and Judgment: Poor Judgment, Poor Insight  Present Suicidal Ideation: No  Present Homicidal Ideation: No    Tobacco Screening:  Practical Counseling, on admission, clifton X, if applicable and completed (first 3 are required if patient doesn't refuse):            ( )  Recognizing danger situations (included triggers and roadblocks)                    ( )  Coping skills (new ways to manage stress, exercise, relaxation techniques, changing routine, distraction)                                                           ( )  Basic information about quitting (benefits of quitting, techniques in how to quit, available resources  ( ) Referral for counseling faxed to [facility]                                           (x ) Patient refused counseling  ( ) Patient has not smoked in the last 30 days    Metabolic Screening:    No results found for: LABA1C    No results found for: CHOL  No results found for: TRIG  No results found for: HDL  No components found for: LDLCAL  No results found for: LABVLDL      There is no height or weight on file to calculate BMI. BP Readings from Last 2 Encounters:   11/07/20 (!) 160/91   10/05/17 121/78           Pt admitted with followings belongings:         Valuables placed in safe in security envelope . Patient's home medications were not brought to hospital.  Patient oriented to surroundings and program expectations and copy of patient rights given. Received admission packet:  yes. Consents reviewed, signed yes. b  Patient verbalize understanding:  yes.     Patient education on precautions: yes                   Barby Groves RN

## 2020-11-07 NOTE — H&P
Department of Psychiatry  Attending History and Physical - Adult         CHIEF COMPLAINT: Status post overdose of prescribed medication. History obtained from:  patient    HISTORY OF PRESENT ILLNESS:          The patient is a 31 y.o. female with a previous history of schizoaffective disorder, who initially was admitted to medical services secondary to overdose of Wellbutrin and having a seizures. When patient's condition stabilized, patient was transferred to psychiatric unit for psychiatric evaluation and psychotropic medications management. Patient chart has been reviewed. For initial psychiatric evaluation, please, refer to the consult note performed by Dr. Vimal Sandoval, as reflected below:    \"31year-old white female with history of schizoaffective disorder and ADHD, admitted status post overdose on Wellbutrin. She reportedly took 8600 mg of Wellbutrin after which she became confused and was hallucinating. She had seizures on her way to the ED. In the ED she was intubated and heavily sedated. No more seizures reported since then. UDS positive for amphetamine (may be FP due to Wellbutrin), BAL <10. CT head with no acute changes. EEG showed no epileptiform activity.     Patient is observed resting in bed this morning. She is calm and cooperative during the exam.  Appears guarded and is a poor historian. States she does not remember taking the pills. \"Last thing I remember is going to bed. \"  Denies suicide attempt. Patient denies suicidal ideation at this time. States she is not suicidal at home. Admits to being depressed in the setting of her social stressors. She shares custody of her son with her ex. She is unemployed and looking for a job. She is staying with her parents. She denies mood swings and racing thoughts. She denies auditory and visual hallucinations. She reports poor sleep. She has been [place] counselor in [place].   States she was planning to see a psychiatrist for med management - \"I need to get my Adderall. I have trouble concentrating\". Patient gave us permission to call her parents for collateral.\"    Patient has been seen in treatment team room. She reported that she feels stable today and her mood is \"good\". She endorses normal appetite and reported decreased quality of sleep. Patient continues to report decreased level of concentration and stated that she would like to restart previously prescribed Adderall. Other than that patient denies any other affective symptomatology. She denies current active suicidal or homicidal ideations, denies any plans. Also, she denies any auditory and visual hallucinations. Patient declined to consider any psychotropic medications for her mental health with exception of Adderall, and medication for insomnia. PSYCHIATRIC HISTORY:    Diagnoses: Schizoaffective disorder  Suicide attempts/gestures: Cutting her wrist in 2012  Prior hospitalizations: Twice to [facility] in 2012 and 2017  Medication trials: Risperdal, Seroquel, Wellbutrin, Adderall  Mental health contact: Patient sees a counselor    Past Medical History:        Diagnosis Date    ADHD (attention deficit hyperactivity disorder)     Anxiety     Depression     [facility]otic [facility]     Schizophrenia ([facility]     [facility]      Past Surgical History:        Procedure Laterality Date    CYST REMOVAL      right ovary    CYST REMOVAL      wrist     Medications Prior to Admission:   Medications Prior to Admission: nicotine (NICODERM CQ) 21 MG/24HR, Place 1 patch onto the skin every 24 hours  ondansetron (ZOFRAN) 4 MG tablet, Take 2 tablets by mouth every 8 hours as needed for Nausea or Vomiting    Allergies:  Patient has no known allergies. Social History:  Patient , lives with her parents. Currently unemployed. She has 1 son. Patient reported history of alcohol abuse, methamphetamine abuse, pain medications abuse.   Stated that she has been sober for 7 years and was taken off of methadone 4 months ago. Family History:       Problem Relation Age of Onset    Depression Mother     Substance Abuse Mother     High Blood Pressure Mother     Mental Illness Mother     High Blood Pressure Father     High Cholesterol Father     Depression Brother     Mental Illness Brother      Psychiatric Family History  No family history    REVIEW OF SYSTEMS:  General: No fevers, chills, night sweats, no recent weight loss or gain. Head: No headache, no migraine. Eyes: No recent visual changes. Ears: No recent hearing changes. Nose: No increased congestion or change in sense of smell. Throat: No sore throat, no trouble swallowing. Cardiovascular: No chest pain or palpitations, or dizziness. Respiratory: No cough, wheezes, congestion, or shortness of breath. Gastrointestinal: No abdominal pain, nausea or vomiting, no diarrhea or constipation. Musculo-skeletal: No edema, deformities, or loss of functions. Neurological: No loss of consciousness, abnormal sensations, or weakness. Skin: No rash, abrasions or bruises. PHYSICAL EXAM:    Vitals: There were no vitals taken for this visit. Mental Status Examination:   Appearance: Appropriately groomed and in hospital attire. Made good eye contact. Behavior: Calm, cooperative and socially appropriate. No psychomotor retardation/agitation appreciated. Gait unremarkable. Speech: Normal in tone, volume, and quality. Mood: \"good\"   Affect: Mood congruent. Range is full   Thought Process: Appears linear and goal oriented. Thought Content: Patient does not have any current active suicidal and homicidal ideations. No overt delusions or paranoia appreciated. Perceptions: Seems patient does not have any auditory or visual hallucinations at present time. Patient did not appear to be responding to internal stimuli. No overt psychosis. Executive Functions: Appear mildly impaired.    Concentration: Decreased  Reasoning: Appears impaired based on interaction from interview   Orientation: to person, place, date, and situation. Alert. Language: Intact. Fund of information: Intact. Memory: recent and remote appear intact. Impulsivity: Limited. Neurovegitative: Fair appetite, decreased sleep. Insight: Impaired. Judgment: Impaired. Physical Exam:  GENERAL APPEARANCE: 31y.o. year-old female in NAD   HEAD: Normocephalic, atraumatic. THROAT: No erythema, exudates, lesions. No tongue laceration. CARDIOVASCULAR: PMI nondisplaced. Regular rhythm and rate. Normal S1 and S2.  PULMONARY: Clear to auscultation bilaterally, no tenderness to palpation. ABDOMEN: Soft, nontender, nondistended. MUSCULOSKELTAL: No obvious deformities, clubbing, cyanosis or edema, no spinous process or paraspinous tenderness, normal ROM, normal gait, distal pulses intact symmetric 2+ bilaterally. NEUROLOGICAL: Alert, oriented x 4, CN II-XII grossly intact, motor strength 4/5 all muscle groups, DTR 2+ intact and symmetric, sensation intact to sharp and dull. No abnormal movements or tremors.    SKIN: Warm, dry, intact, no rash, abrasions or bruises    DATA:  Labs:    CBC:   Lab Results   Component Value Date    WBC 14.0 11/07/2020    RBC 3.28 11/07/2020    HGB 10.0 11/07/2020    HCT 30.3 11/07/2020    MCV 92.4 11/07/2020    MCH 30.5 11/07/2020    MCHC 33.0 11/07/2020    RDW 13.7 11/07/2020     11/07/2020    MPV 10.1 11/07/2020     CMP:    Lab Results   Component Value Date     11/07/2020    K 3.9 11/07/2020    K 3.7 11/03/2020     11/07/2020    CO2 20 11/07/2020    BUN 2 11/07/2020    CREATININE 0.6 11/07/2020    GFRAA >59 11/07/2020    LABGLOM >60 11/07/2020    GLUCOSE 90 11/07/2020    PROT 5.4 11/07/2020    PROT 7.0 03/04/2013    LABALBU 3.3 11/07/2020    CALCIUM 8.1 11/07/2020    BILITOT 0.4 11/07/2020    ALKPHOS 65 11/07/2020    AST 25 11/07/2020    ALT 22 11/07/2020       DSM 5 DIAGNOSIS:  Depression, unspecified  Schizoaffective disorder, per history  Status post overdose of prescribed Wellbutrin  Alcohol use disorder, in sustained remission  Opioid use disorder, in sustained remission  Tobacco use disorder  Insomnia    Plan:   -Admit to [facility] adult Unit and monitor on 15 minute checks  -Genie Forth reviewed. -Gather collateral information from family with release  -Medical monitoring to be performed by Dr. Nicolas Evans and associates  -Acclimate to the unit. -Encourage participation in groups and therapeutic activities as appropriate.  -Medications: Will start on trazodone 50 mg at bedtime for insomnia.   Patient will be provided with nicotine patch 21 mg / 24 hours for nicotine replacement.    -The risks, benefits, side effects, indications, contraindications, and adverse effects of the medications have been discussed.  -The patient has verbalized understanding and has capacity to give informed consent.  -SW help evaluating home environment.   -Discuss with treatment team.

## 2020-11-07 NOTE — PROGRESS NOTES
12 hour chart check review completed. Electronically signed by Gurney Kehr, LPN on 14/1/2944 at 4:72 AM

## 2020-11-07 NOTE — PROGRESS NOTES
Admission Note      Reason for admission/Target Symptom: Patient admitted to [facility] due to a drug overdose and pt was observed having 2 seizures upon arrival to the ED. Patient took 8600 mg of Wellbutrin and she had just filled the prescription that day after seeing a psychiatrist in [facility]. Patient has a history of suicide attempts in the past. Collateral was collected sometime after that with pt father. He stated that the pt has been cutting herself over the last few months and he said he has heard her talking to herself too and said she gets louder and louder. Diagnoses: Drug Overdose, undetermined intent, initial encounter    UDS: Positive for Amphetamines  BAL: Negative <10     SW met with treatment team to discuss patient's treatment including care planning, discharge planning, and follow-up needs. Pt has been admitted to [facility]. Treatment team has identified patient's discharge needs as medication management and outpatient therapy/counseling. Pt confirmed  the need for ongoing treatment post inpatient stay. Pt was also provided a handout of contact information for drug and alcohol treatment centers and other community support service such as MICHAEL, AA, and [facility].

## 2020-11-07 NOTE — PLAN OF CARE
Problem: Falls - Risk of:  Goal: Will remain free from falls  Description: Will remain free from falls  11/7/2020 0430 by Marti Grullon LPN  Outcome: Ongoing  11/6/2020 1815 by Sara Larkin RN  Outcome: Ongoing  Goal: Absence of physical injury  Description: Absence of physical injury  11/7/2020 0430 by Marti Grullon LPN  Outcome: Ongoing  11/6/2020 1815 by Sara Larkin RN  Outcome: Ongoing     Problem: Skin Integrity:  Goal: Will show no infection signs and symptoms  Description: Will show no infection signs and symptoms  11/7/2020 0430 by Marti Grullon LPN  Outcome: Ongoing  11/6/2020 1815 by Sara Larkin RN  Outcome: Ongoing  Goal: Absence of new skin breakdown  Description: Absence of new skin breakdown  11/7/2020 0430 by Marti Grullon LPN  Outcome: Ongoing  11/6/2020 1815 by Sara Larkin RN  Outcome: Ongoing     Problem: Coping:  Goal: Ability to cope will improve  Description: Ability to cope will improve  11/7/2020 0430 by Marti Grullon LPN  Outcome: Ongoing  11/6/2020 1815 by Sara Larkin RN  Outcome: Ongoing  Goal: Ability to identify appropriate support needs will improve  Description: Ability to identify appropriate support needs will improve  11/7/2020 0430 by Marti Grullon LPN  Outcome: Ongoing  11/6/2020 1815 by Sara Larkin RN  Outcome: Ongoing     Problem: Health Behavior:  Goal: Ability to manage health-related needs will improve  Description: Ability to manage health-related needs will improve  11/7/2020 0430 by Marti Grullon LPN  Outcome: Ongoing  11/6/2020 1815 by Sara Larkin RN  Outcome: Ongoing     Problem: Physical Regulation:  Goal: Signs of adequate cerebral perfusion will increase  Description: Signs of adequate cerebral perfusion will increase  11/7/2020 0430 by Marti Grullon LPN  Outcome: Ongoing  11/6/2020 1815 by Sara Larkin RN  Outcome: Ongoing  Goal: Ability to maintain a stable neurologic state will improve  Description: Ability to maintain a stable neurologic state will improve  11/7/2020 0430 by Jesse Cardona LPN  Outcome: Ongoing  11/6/2020 1815 by Jud Amador RN  Outcome: Ongoing     Problem: Safety:  Goal: Ability to remain free from injury will improve  Description: Ability to remain free from injury will improve  11/7/2020 0430 by Jesse Cardona LPN  Outcome: Ongoing  11/6/2020 1815 by Jud Amador RN  Outcome: Ongoing     Problem: Self-Concept:  Goal: Level of anxiety will decrease  Description: Level of anxiety will decrease  11/7/2020 0430 by Jesse Cardona LPN  Outcome: Ongoing  11/6/2020 1815 by Jud Amador RN  Outcome: Ongoing  Goal: Ability to verbalize feelings about condition will improve  Description: Ability to verbalize feelings about condition will improve  11/7/2020 0430 by Jesse Cardona LPN  Outcome: Ongoing  11/6/2020 1815 by Jud Amador RN  Outcome: Ongoing     Problem: Urinary Elimination:  Goal: Signs and symptoms of infection will decrease  Description: Signs and symptoms of infection will decrease  11/7/2020 0430 by Jesse Cardona LPN  Outcome: Ongoing  11/6/2020 1815 by Jud Amador RN  Outcome: Ongoing  Goal: Complications related to the disease process, condition or treatment will be avoided or minimized  Description: Complications related to the disease process, condition or treatment will be avoided or minimized  11/7/2020 0430 by Jesse Cardona LPN  Outcome: Ongoing  11/6/2020 1815 by Jud Amador RN  Outcome: Ongoing

## 2020-11-08 PROCEDURE — 6370000000 HC RX 637 (ALT 250 FOR IP): Performed by: PSYCHIATRY & NEUROLOGY

## 2020-11-08 PROCEDURE — 1240000000 HC EMOTIONAL WELLNESS R&B

## 2020-11-08 RX ADMIN — TRAZODONE HYDROCHLORIDE 50 MG: 50 TABLET ORAL at 20:58

## 2020-11-08 RX ADMIN — ACETAMINOPHEN 650 MG: 325 TABLET ORAL at 02:22

## 2020-11-08 RX ADMIN — ACETAMINOPHEN 650 MG: 325 TABLET ORAL at 10:47

## 2020-11-08 ASSESSMENT — PAIN SCALES - GENERAL
PAINLEVEL_OUTOF10: 4
PAINLEVEL_OUTOF10: 7
PAINLEVEL_OUTOF10: 7

## 2020-11-08 ASSESSMENT — PAIN DESCRIPTION - LOCATION
LOCATION: THROAT
LOCATION: THROAT

## 2020-11-08 ASSESSMENT — PAIN DESCRIPTION - ONSET
ONSET: GRADUAL
ONSET: GRADUAL

## 2020-11-08 ASSESSMENT — ENCOUNTER SYMPTOMS
GASTROINTESTINAL NEGATIVE: 1
RESPIRATORY NEGATIVE: 1
EYES NEGATIVE: 1

## 2020-11-08 ASSESSMENT — PAIN DESCRIPTION - FREQUENCY
FREQUENCY: INTERMITTENT
FREQUENCY: INTERMITTENT

## 2020-11-08 ASSESSMENT — PAIN DESCRIPTION - DESCRIPTORS
DESCRIPTORS: ACHING
DESCRIPTORS: ACHING

## 2020-11-08 ASSESSMENT — PAIN DESCRIPTION - PAIN TYPE
TYPE: ACUTE PAIN
TYPE: ACUTE PAIN

## 2020-11-08 ASSESSMENT — PAIN DESCRIPTION - PROGRESSION
CLINICAL_PROGRESSION: NOT CHANGED
CLINICAL_PROGRESSION: NOT CHANGED

## 2020-11-08 NOTE — PROGRESS NOTES
B[facility] Daily Shift Assessment  Nursing Progress Note    Room: [facility]/604-01 Name: Maico Garg Age: 31 y.o. Ethnicity:  Gender: female   Dx: <principal problem not specified>  Precautions: suicide risk  CPAP: No Accu-Chek: No  MSE:  Status and Exam  Normal: No  Facial Expression: Sad  Affect: Congruent  Level of Consciousness: Alert  Mood:Normal: No  Mood: Depressed, Anxious, Sad  Motor Activity:Normal: Yes  Interview Behavior: Cooperative  Preception: [place] to Time, [place] to Person, [place] to Place, [place] to Situation  Attention:Normal: Yes  Thought Processes: Circumstantial  Thought Content:Normal: Yes  Thought Content: Preoccupations  Hallucinations: None  Delusions: No  Memory:Normal: Yes  Memory: Poor Recent  Insight and Judgment: No  Insight and Judgment: Poor Insight, Poor Judgment  Present Suicidal Ideation: No  Present Homicidal Ideation: No  Sleep: Yes, Fair, has restless sleep and has frequent nighttime awakenings Hours Slept: 7 Sched Sleep Meds: Yes PRN Sleep Meds: Yes Other PRN Meds: Yes Med Compliant: Yes Appetite: good Percent Meals: 75% Social: somewhat with peers  ADLs: Yes Speech: hesitant Depression: 1 Anxiety: 1    Pt reports better sleep tonight but still dd wake up a few times. She did get up to eat breakfast and atend group. She has a flat affect and is not very social with peers or staff. She did shower yesterday. She rates anxiety and depression both 1 out 10. This incongruent. She denies SI, HI and AVH.     Zahra Thayer RN

## 2020-11-08 NOTE — H&P
HISTORY AND PHYSICAL    Radha Mancia is a 31 y.o.  female admitted from medical floor. She presents to the emergency department after overdose. Patient took 8600 mg of Wellbutrin. Patient filled Wellbutrin prescription today after seeing psychiatrist in [facility]. Patient has a history of suicide attempts in the past.  Patient was observed to have 2 seizures prior to arrival.  Patient is unable to give history at this time as she is actively having a seizure. This is all the information the patient provides.     HISTORY:    Patient Active Problem List   Diagnosis    [facility]ive [facility] ([facility] [address] [facility]    Seizure ([facility]    Drug overdose    [facility]    Depression       Past Medical History:   Diagnosis Date    ADHD (attention deficit hyperactivity disorder)     Anxiety     Depression     [facility]     Schizophrenia [facility]     Substance abuse ([facility]        Family History   Problem Relation Age of Onset    Depression Mother     Substance Abuse Mother     High Blood Pressure Mother     Mental Illness Mother     High Blood Pressure Father     High Cholesterol Father     Depression Brother     Mental Illness Brother        Social History     Socioeconomic History    Marital status: Single     Spouse name: Not on file    Number of children: 1    Years of education: Not on file    Highest education level: Not on file   Occupational History    Not on file   Social Needs    Financial resource strain: Not on file    Food insecurity     Worry: Not on file     Inability: Not on file   [employer] needs     Medical: Not on file     Non-medical: Not on file   Tobacco Use    Smoking status: Heavy Tobacco Smoker     Packs/day: 1.00     Types: Cigarettes    Smokeless tobacco: Never Used   Substance and Sexual Activity    Alcohol use: No    Drug use: Yes     Types: Methamphetamines    Sexual activity: Yes     Partners: Male Lifestyle    Physical activity     Days per week: Not on file     Minutes per session: Not on file    Stress: Not on file   Relationships    Social connections     Talks on phone: Not on file     Gets together: Not on file     Attends Sikh service: Not on file     Active member of club or organization: Not on file     Attends meetings of clubs or organizations: Not on file     Relationship status: Not on file    Intimate partner violence     Fear of current or ex partner: Not on file     Emotionally abused: Not on file     Physically abused: Not on file     Forced sexual activity: Not on file   Other Topics Concern    Not on file   Social History Narrative    Not on file       Prior to Admission medications    Medication Sig Start Date End Date Taking? Authorizing Provider   nicotine (NICODERM CQ) 21 MG/24HR Place 1 patch onto the skin every 24 hours    Historical Provider, MD   ondansetron (ZOFRAN) 4 MG tablet Take 2 tablets by mouth every 8 hours as needed for Nausea or Vomiting 9/20/17   Montez Colvin, APRN - CNP         Current Facility-Administered Medications:     nicotine (NICODERM CQ) 21 MG/24HR 1 patch, 1 patch, Transdermal, Daily, Ryan Kearney MD, 1 patch at 11/08/20 0826    acetaminophen (TYLENOL) tablet 650 mg, 650 mg, Oral, Q4H PRN, Ryan Kearney MD, 650 mg at 11/08/20 0222    traZODone (DESYREL) tablet 50 mg, 50 mg, Oral, Nightly, Ryan Kearney MD, 50 mg at 11/07/20 2236    Patient has no known allergies. REVIEW OF SYSTEMS:    Review of Systems   Constitutional: Negative. HENT: Negative. Eyes: Negative. Respiratory: Negative. Cardiovascular: Negative. Gastrointestinal: Negative. Genitourinary: Negative. Musculoskeletal: Negative. Skin: Negative. Neurological: Negative. Psychiatric/Behavioral: Positive for suicidal ideas.         Depression       PHYSICAL EXAM:    BP (!) 139/90   Pulse 88   Temp 98.1 °F (36.7 °C) (Temporal)   Resp 18   SpO2 96%

## 2020-11-08 NOTE — PROGRESS NOTES
Group Therapy Note    Start Time: 423  End Time:  980  Number of Participants: 17    Type of Group: Community Meeting       Patient's Goal:  \"Talking with DrAcosta going home\"      Notes:        Participation Level:  Active Listener       Participation Quality: Appropriate      Thought Process/Content: Logical      Affective Functioning: Congruent      Mood: Calm      Level of consciousness:  Alert      Modes of Intervention: Support      Discipline Responsible: Behavioral Health Tech II      Signature:  Yanna Chaudhry

## 2020-11-08 NOTE — PROGRESS NOTES
[facility] Adult Unit Daily Assessment  Nursing Progress Note    Room: [facility]/604-01   Name: Janna Sagastume   Age: 31 y.o. Gender: female   Dx: <principal problem not specified>  Precautions: suicide risk  Inpatient Status: voluntary       SLEEP:    Sleep Quality fair  Sleep Medications: Trazodone   PRN Sleep Meds: No       MEDICAL:    Other PRN Meds: No   Med Compliant: Yes  Accu-Chek: No  Oxygen/CPAP/BiPAP: No  CIWA/CINA: No   PAIN Assessment: none or not receiving treatment  Side Effects from medication: No      PSYCH:    Depression: 7   Anxiety: 7   SI denies suicidal ideation   HI Negative for homicidal ideation      AVH:Absent      GENERAL:    Appetite: good    Social: Yes   Speech: normal   Appearance: appropriately dressed and healthy looking    GROUP:    Group Participation: Yes  Participation Quality: Minimal    Notes:     Patient has been observed sitting in day area, sitting with peers with minimal talking with peers. During interview, patient makes eye contact, sad expression, talked about being away from her two year old child and that her son was in the custody of the child's father. Patient does not talk about occurrence that brought her into the hospital.  Talked with patient about attending groups, utilizing coping mechanisms and developing coping skills. Patient was calm, cooperative, no notable behaviors noted. Denies SI,HI,AVH at this time.     Electronically signed by Alison Randall LPN on 13/5/91 at 20:65 PM CST

## 2020-11-08 NOTE — PROGRESS NOTES
Group Therapy Note    Date: 11/7/2020  Start Time: 2000  End Time:  2030  Number of Participants: 12    Type of Group: Wrap-Up    Wellness Binder Information  Module Name:    Session Number:      Patient's Goal:  N/A    Notes:  Patient completed Wrap-Up worksheet. Status After Intervention:  Unchanged    Participation Level:  Active Listener    Participation Quality: Appropriate      Speech:  normal      Thought Process/Content: Logical      Affective Functioning: Congruent      Mood: calm      Level of consciousness:  Alert      Response to Learning: Able to verbalize current knowledge/experience      Endings: None Reported    Modes of Intervention: Education      Discipline Responsible: [address] [place] [address]      Signature:  Haven Rayo

## 2020-11-09 PROCEDURE — 6370000000 HC RX 637 (ALT 250 FOR IP): Performed by: PSYCHIATRY & NEUROLOGY

## 2020-11-09 PROCEDURE — 1240000000 HC EMOTIONAL WELLNESS R&B

## 2020-11-09 PROCEDURE — 99232 SBSQ HOSP IP/OBS MODERATE 35: CPT | Performed by: PSYCHIATRY & NEUROLOGY

## 2020-11-09 RX ORDER — MIRTAZAPINE 7.5 MG/1
7.5 TABLET, FILM COATED ORAL NIGHTLY
Status: DISCONTINUED | OUTPATIENT
Start: 2020-11-09 | End: 2020-11-10 | Stop reason: HOSPADM

## 2020-11-09 RX ORDER — HYDROXYZINE HYDROCHLORIDE 25 MG/1
25 TABLET, FILM COATED ORAL 3 TIMES DAILY PRN
Status: DISCONTINUED | OUTPATIENT
Start: 2020-11-09 | End: 2020-11-10 | Stop reason: HOSPADM

## 2020-11-09 RX ADMIN — TRAZODONE HYDROCHLORIDE 50 MG: 50 TABLET ORAL at 20:39

## 2020-11-09 RX ADMIN — MIRTAZAPINE 7.5 MG: 7.5 TABLET, FILM COATED ORAL at 20:39

## 2020-11-09 NOTE — PROGRESS NOTES
Group Therapy Note    Start Time: 800  End Time:  632  Number of Participants: 15    Type of Group: Community Meeting       Patient's Goal:  \"getting to go home\"      Notes:      Participation Level:  Active Listener       Participation Quality: Appropriate      Thought Process/Content: Logical      Affective Functioning: Congruent      Mood: calm      Level of consciousness:  Alert      Modes of Intervention: Support      Discipline Responsible: Behavioral Health Tech II      Signature:  Teressa Bullock

## 2020-11-09 NOTE — PROGRESS NOTES
Department of Psychiatry  Attending Progress Note     Chief complaint: \"I am doing better\"    SUBJECTIVE:   Chart reviewed, discussed with the team.  No major issues overnight. Patient is more social and more engaged. Compliant with medications. Performs ADLs. Patient is observed in her room this morning. She is calm and cooperative. Somewhat more interactive. States she is doing \"better\", however, affect is still somewhat incongruent. Denies suicidal ideation. Still denies suicide attempt. Admits to being under a lot of stress lately. Claims she is ready to leave. We discussed her treatment plan. Patient initially denies substance use, however, later admits to using KRATOM. States she uses several times a week. We discussed the harmful effects of substances on her physical and mental health. Encouraged sobriety. Patient was receptive. OBJECTIVE    Physical  Wt Readings from Last 3 Encounters:   11/05/20 209 lb 4.8 oz (94.9 kg)   10/05/17 182 lb (82.6 kg)   09/20/17 184 lb (83.5 kg)     Temp Readings from Last 3 Encounters:   11/09/20 97.6 °F (36.4 °C) (Temporal)   11/07/20 98.5 °F (36.9 °C) (Temporal)   02/17/17 97.7 °F (36.5 °C) (Temporal)     BP Readings from Last 3 Encounters:   11/09/20 124/84   11/07/20 (!) 160/91   10/05/17 121/78     Pulse Readings from Last 3 Encounters:   11/09/20 168   11/07/20 79   10/05/17 83        Review of Systems: 14-point review of systems negative except as described above    Mental Status Examination:   Appearance: Older than stated age, hygiene improved  Behavior: Calm, cooperative  Speech: Normal in tone, volume, and quality. No slurring, dysarthria or pressured speech noted. Mood: \"Better\"   Affect: Mood incongruent. Thought Process: Appears linear. Thought Content: Denies suicidal and homicidal ideation. No overt delusions or paranoia appreciated. Perceptions: Denies auditory or visual hallucinations at present time.  Not responding to internal supportive psychotherapy. Encourage socialization and participation in recreational activities. Work on coping skills. 3. Medical Issues:    Continue medical monitoring by Dr. Jewel Walls and associates. Order routine labs. 4. Disposition:     to provide outpatient resources and facilitate disposition.      Amount of time spent with patient:      25 minutes with greater than 50 % of the time spent in counseling and collaboration of care

## 2020-11-09 NOTE — PROGRESS NOTES
Collateral obtained from: patients father [phone] Ana Cristina Bailey)    Immediate Stressors & Time Episode Began: Patient has been trying to get into the doctor office. Patient went to the doctor or Tuesday and got some new medication. Patient reported that she wanted to manage her own medication. Patient took too much of her medication. Diagnosis/Hx of compliance with meds: Schizoaffective disorder Taking more medication than needed. Tx Hx/Past hospitalizations:  Previous admssions    Family hx of psychiatric issues: Patients mom has been diagnosed with Depresson    Substance Abuse: Substance abuse with prescription pills and meth    Pending Legal: No issues    Safety Issues (Weapons? Hx of attempts): Weapons are in the house but it isnt loaded. Patient has cut herself in the past.      Support system/Medication Managed by:  The importance of medication management and locking extra medication in a secured location was explained and reccommended to collateral.     Additional Info: Patient lives with her parents

## 2020-11-09 NOTE — PROGRESS NOTES
Treatment Team Note:    SOURAV met with [address] team to discuss Pts [address] plans. Progress/Behavior/Group Attendance: TBD    Target Symptoms/Reason for admission:  Patient admitted to [facility] due to a drug overdose and pt was observed having 2 seizures upon arrival to the ED.  Patient took 8600 mg of Wellbutrin and she had just filled the prescription that day after seeing a psychiatrist in [place], [address] has a history of suicide attempts in the past. Collateral was collected sometime after that with pt father. He stated that the pt has been cutting herself over the last few months and he said he has heard her talking to herself too and said she gets louder and louder.      Diagnoses: Depression, unspecified, Schizoaffective disorder, per history, Status post overdose of prescribed Wellbutrin, Alcohol use disorder, in sustained remission, Opioid use disorder, in sustained remission, Tobacco use disorder  Insomnia     UDS: Positive for Amphetamines  BAL: Negative <10            AftercarePlan: [address] lives with: SOURAV will meet with pt to gather information. Collateral obtained from: SW will meet with pt to gather release of information.   On:    Family Session: VASILE    Misc:

## 2020-11-09 NOTE — PLAN OF CARE
Problem: Depressive Behavior With or Without Suicide Precautions:  Goal: Able to verbalize acceptance of life and situations over which he or she has no control  11/9/2020 1159 by Romy Rivera  Outcome: Ongoing      Group Therapy Note     Date: 11/9/2020  Start Time: 1100  End Time:  6270  Number of Participants: 10     Type of Group: Psychoeducation     Wellness Binder Information  Module Name:  emotional wellness  Session Number:  1     Patient's Goal:  validation of feelings     Notes:  pt acknowledged to have feelings validated it may be necessary to share feelings with others.      Status After Intervention:  Improved     Participation Level: Interactive     Participation Quality: Appropriate, Attentive, and Sharing        Speech:  normal        Thought Process/Content: Logical        Affective Functioning: Congruent        Mood: congruent        Level of consciousness:  Alert, Oriented x4, and Attentive        Response to Learning: Able to verbalize current knowledge/experience        Endings: None Reported     Modes of Intervention: Education        Discipline Responsible: Psychoeducational Specialist        Signature:  Romy Rivera

## 2020-11-09 NOTE — PROGRESS NOTES
[facility] Adult Unit Daily Assessment  Nursing Progress Note    Room: [facility]/604-01   Name: Elli Mckinney   Age: 31 y.o. Gender: female   Dx: <principal problem not specified>  Precautions: suicide risk  Inpatient Status: voluntary       SLEEP:    Sleep Quality Good  Sleep Medications: Trazodone   PRN Sleep Meds: No       MEDICAL:    Other PRN Meds: No   Med Compliant: Yes  Accu-Chek: No  Oxygen/CPAP/BiPAP: No  CIWA/CINA: No   PAIN Assessment: none  Side Effects from medication: No      PSYCH:    Depression: 3   Anxiety: 3   SI denies suicidal ideation   HI Negative for homicidal ideation      AVH:Absent      GENERAL:    Appetite: good    Social: Yes   Speech: normal   Appearance: appropriately dressed and appropriately groomed    GROUP:    Group Participation: Yes  Participation Quality: Active Listener    Notes:     More social tonight, interacting well with peers and with staff. Patient was out in day area, has been to groups. Talked with nurse during interview, \"I didn't want to be here at first, but now I know I needed to be. \"  Talked with patient regards to utilizing coping skills. No behaviors noted.     Electronically signed by Zulma Francois LPN on 38/4/32 at 86:87 PM CST

## 2020-11-09 NOTE — PROGRESS NOTES
Group Therapy Note    Date: 11/8/2020  Start Time: 2000  End Time:  2030  Number of Participants: 14    Type of Group: Wrap-Up    Wellness Binder Information  Module Name:    Session Number:      Patient's Goal:  See self-inventory    Notes:  Patient completed Wrap-Up worksheet. Status After Intervention:  Improved    Participation Level:  Active Listener    Participation Quality: Appropriate      Speech:  normal      Thought Process/Content: Logical      Affective Functioning: Congruent      Mood: calm      Level of consciousness:  Alert      Response to Learning: Able to retain information      Endings: None Reported    Modes of Intervention: Education      Discipline Responsible: Nadia Ramirez 1, [employer]      Signature:  Lopez Chaudhry

## 2020-11-09 NOTE — PLAN OF CARE
Problem: Depressive Behavior With or Without Suicide Precautions:  Goal: Able to verbalize support systems  Outcome: Ongoing       Group Therapy Note     Date: 11/9/2020  Start Time: 5131  End Time:  1600  Number of Participants: 9     Type of Group: Recovery     Wellness Binder Information  Module Name:  relapse prevention  Session Number:  4     Patient's Goal:  relapse prevention toolbox     Notes:  pt acknowledged the use of positive coping skills to help prevent relapse.      Status After Intervention:  Improved     Participation Level: Interactive     Participation Quality: Appropriate, Attentive, and Sharing        Speech:  normal        Thought Process/Content: Logical        Affective Functioning: Congruent        Mood: congruent        Level of consciousness:  Alert, Oriented x4, and Attentive        Response to Learning: Able to verbalize current knowledge/experience        Endings: None Reported     Modes of Intervention: Education        Discipline Responsible: Psychoeducational Specialist        Signature:  Isabel Elias

## 2020-11-09 NOTE — PLAN OF CARE
Problem: Depressive Behavior With or Without Suicide Precautions:  Goal: Able to verbalize and/or display a decrease in depressive symptoms  Outcome: Ongoing      Group Therapy Note     Date: 11/9/2020  Start Time: 1430  End Time:  5399  Number of Participants: 9     Type of Group: Cognitive Skills     Wellness Binder Information  Module Name:  emotional wellness  Session Number:  5     Patient's Goal:  obstacles to emotional wellness     Notes:  pt acknowledged negative thinking as an obstacle to emotional wellness.      Status After Intervention:  Improved     Participation Level: Interactive     Participation Quality: Appropriate, Attentive, and Sharing        Speech:  normal        Thought Process/Content: Logical        Affective Functioning: Congruent        Mood: congruent        Level of consciousness:  Alert, Oriented x4, and Attentive        Response to Learning: Able to verbalize current knowledge/experience        Endings: None Reported     Modes of Intervention: Education        Discipline Responsible: Psychoeducational Specialist        Signature:  Niko Manriquez

## 2020-11-10 VITALS
RESPIRATION RATE: 18 BRPM | HEART RATE: 83 BPM | DIASTOLIC BLOOD PRESSURE: 86 MMHG | OXYGEN SATURATION: 98 % | SYSTOLIC BLOOD PRESSURE: 126 MMHG | TEMPERATURE: 97 F

## 2020-11-10 PROBLEM — F25.9 SCHIZOAFFECTIVE DISORDER (HCC): Status: RESOLVED | Noted: 2017-02-15 | Resolved: 2020-11-10

## 2020-11-10 PROBLEM — T50.902A SUICIDAL OVERDOSE, INITIAL ENCOUNTER (HCC): Status: RESOLVED | Noted: 2020-11-07 | Resolved: 2020-11-10

## 2020-11-10 PROBLEM — F33.2 MAJOR DEPRESSIVE DISORDER, RECURRENT, SEVERE WITHOUT PSYCHOTIC FEATURES (HCC): Chronic | Status: ACTIVE | Noted: 2020-11-10

## 2020-11-10 PROBLEM — F32.A DEPRESSION: Status: RESOLVED | Noted: 2020-11-07 | Resolved: 2020-11-10

## 2020-11-10 LAB
BASOPHILS ABSOLUTE: 0.1 K/UL (ref 0–0.2)
BASOPHILS RELATIVE PERCENT: 0.6 % (ref 0–1)
CHOLESTEROL, TOTAL: 177 MG/DL (ref 160–199)
EOSINOPHILS ABSOLUTE: 0.2 K/UL (ref 0–0.6)
EOSINOPHILS RELATIVE PERCENT: 1.8 % (ref 0–5)
HBA1C MFR BLD: 4.9 % (ref 4–6)
HCT VFR BLD CALC: 38 % (ref 37–47)
HDLC SERPL-MCNC: 26 MG/DL (ref 65–121)
HEMOGLOBIN: 12.7 G/DL (ref 12–16)
IMMATURE GRANULOCYTES #: 0.1 K/UL
LDL CHOLESTEROL CALCULATED: 112 MG/DL
LYMPHOCYTES ABSOLUTE: 3.3 K/UL (ref 1.1–4.5)
LYMPHOCYTES RELATIVE PERCENT: 27.1 % (ref 20–40)
MCH RBC QN AUTO: 30.5 PG (ref 27–31)
MCHC RBC AUTO-ENTMCNC: 33.4 G/DL (ref 33–37)
MCV RBC AUTO: 91.3 FL (ref 81–99)
MONOCYTES ABSOLUTE: 0.6 K/UL (ref 0–0.9)
MONOCYTES RELATIVE PERCENT: 5 % (ref 0–10)
NEUTROPHILS ABSOLUTE: 7.9 K/UL (ref 1.5–7.5)
NEUTROPHILS RELATIVE PERCENT: 65.1 % (ref 50–65)
PDW BLD-RTO: 13.4 % (ref 11.5–14.5)
PLATELET # BLD: 336 K/UL (ref 130–400)
PMV BLD AUTO: 9.5 FL (ref 9.4–12.3)
RBC # BLD: 4.16 M/UL (ref 4.2–5.4)
TRIGL SERPL-MCNC: 196 MG/DL (ref 0–149)
TSH REFLEX FT4: 1.31 UIU/ML (ref 0.35–5.5)
VITAMIN B-12: 477 PG/ML (ref 211–946)
VITAMIN D 25-HYDROXY: 16.4 NG/ML
WBC # BLD: 12.1 K/UL (ref 4.8–10.8)

## 2020-11-10 PROCEDURE — 82306 VITAMIN D 25 HYDROXY: CPT

## 2020-11-10 PROCEDURE — 85025 COMPLETE CBC W/AUTO DIFF WBC: CPT

## 2020-11-10 PROCEDURE — 36415 COLL VENOUS BLD VENIPUNCTURE: CPT

## 2020-11-10 PROCEDURE — 82607 VITAMIN B-12: CPT

## 2020-11-10 PROCEDURE — 80061 LIPID PANEL: CPT

## 2020-11-10 PROCEDURE — 84443 ASSAY THYROID STIM HORMONE: CPT

## 2020-11-10 PROCEDURE — 83036 HEMOGLOBIN GLYCOSYLATED A1C: CPT

## 2020-11-10 PROCEDURE — 5130000000 HC BRIDGE APPOINTMENT

## 2020-11-10 PROCEDURE — 6370000000 HC RX 637 (ALT 250 FOR IP): Performed by: PSYCHIATRY & NEUROLOGY

## 2020-11-10 PROCEDURE — 99239 HOSP IP/OBS DSCHRG MGMT >30: CPT | Performed by: PSYCHIATRY & NEUROLOGY

## 2020-11-10 RX ORDER — NICOTINE 21 MG/24HR
1 PATCH, TRANSDERMAL 24 HOURS TRANSDERMAL DAILY
Qty: 14 PATCH | Refills: 0 | Status: SHIPPED | OUTPATIENT
Start: 2020-11-11

## 2020-11-10 RX ORDER — MIRTAZAPINE 7.5 MG/1
7.5 TABLET, FILM COATED ORAL NIGHTLY
Qty: 14 TABLET | Refills: 0 | Status: SHIPPED | OUTPATIENT
Start: 2020-11-10 | End: 2020-11-24

## 2020-11-10 RX ORDER — ERGOCALCIFEROL 1.25 MG/1
50000 CAPSULE ORAL WEEKLY
Qty: 2 CAPSULE | Refills: 0 | Status: SHIPPED | OUTPATIENT
Start: 2020-11-10 | End: 2020-11-24

## 2020-11-10 NOTE — PROGRESS NOTES
Discharge Note     Pt discharging on this date. Pt denies SI, HI, and AVH at this time. Pt reports improvement in behavior and is leaving unit in overall good condition. SW and pt discussed pt's follow up appointments and importance of attending appointments as scheduled, pt voiced understanding and agreement. Pt and SW also discussed pt safety plan and pt able to verbally identify: warning signs, coping strategies, places and people that help make the pt feel better/distract negative thoughts, friends/family/agencies/professionals the pt can reach out to in a crisis, and something that is important to the pt/worth living for. Pt provided the national suicide prevention hotline number (9-481-506-273-421-2756) as well as local community behavioral health [facility]) crisis number for emergencies (5-265.288.7101).      Pt to follow up with:  Bria Valdovinos with in week of discharge for therapy and medication managment  Referral to out patient tobacco cessation counseling treatment:    Patient refused tobacco cessation counseling    SW offered to assist pt with transportation, pt reports that she has transportation

## 2020-11-10 NOTE — PROGRESS NOTES
Treatment Team Note:     SW met with [address] team to discuss Pts [place] and [place] plans.      Progress/Behavior/Group Attendance: TBD     Target Symptoms/Reason for admission:  Patient admitted to [facility] due to a drug overdose and pt was observed having 2 seizures upon arrival to the ED.  Patient took 8600 mg of Wellbutrin and she had just filled the prescription that day after seeing a psychiatrist in [place], [address] [address] has a history of suicide attempts in the past. Collateral was collected sometime after that with pt father. He stated that the pt has been cutting herself over the last few months and he said he has heard her talking to herself too and said she gets louder and louder.      Diagnoses: Depression, unspecified, Schizoaffective disorder, per history, Status post overdose of prescribed Wellbutrin, Alcohol use disorder, in sustained remission, Opioid use disorder, in sustained remission, Tobacco use disorder  Insomnia     UDS: Positive for Amphetamines  BAL: Negative <10               AftercarePlan: [address] lives with: SW will meet with pt to gather information.     Collateral obtained from: SW will meet with pt to gather release of information.   On:     Family Session: VASILE     Misc:

## 2020-11-10 NOTE — PROGRESS NOTES
CLINICAL PHARMACY NOTE: MEDS TO [address] Patient?: No  Total # of Prescriptions Filled: 3   The following medications were delivered to the patient:  · Nicotine 21mg/24h patch  · Mirtazapine 7.5mg  · Vitamin D 1.25mg  Total # of Interventions Completed: 0  Time Spent (min): 15    Additional Documentation:    [address] picked up by [employer]

## 2020-11-10 NOTE — DISCHARGE SUMMARY
Discharge Summary     Patient ID:  Aaliyah Fink  [id number]  34 y.o.  1990    Admit date: 11/7/2020  Discharge date: 11/10/2020    Admitting Physician: Marlene Walters MD   Attending Physician: Marlene Walters MD, Alex Casas MD   Discharge Provider: Alex Casas MD     Admission Diagnoses:   Depression, unspecified  Schizoaffective disorder, per history  Status post overdose of prescribed Wellbutrin  Alcohol use disorder, in sustained remission  Opioid use disorder, in sustained remission  Tobacco use disorder  Insomnia    Discharge Diagnoses:   Major depressive disorder, recurrent, severe  KRATOM use   Alcohol use disorder, in sustained remission  Opioid use disorder, in sustained remission  Tobacco use disorder  Vitamin D deficiency  Hypertriglyceridemia    Admission Condition: poor    Discharged Condition: stable    Indication for Admission: overdose    CHIEF COMPLAINT: Status post overdose of prescribed medication.     History obtained from:  patient     HISTORY OF PRESENT ILLNESS (per Dr. Saud Lockwood): The patient is a 31 y.o. female with a previous history of schizoaffective disorder, who initially was admitted to medical services secondary to overdose of Wellbutrin and having a seizures. When patient's condition stabilized, patient was transferred to psychiatric unit for psychiatric evaluation and psychotropic medications management.     Patient chart has been reviewed. For initial psychiatric evaluation, please, refer to the consult note performed by Dr. Eric Lala, as reflected below:    \"31year-old white female with history of schizoaffective disorder and ADHD, admitted status post overdose on Wellbutrin. She reportedly took 8600 mg of Wellbutrin after which she became confused and was hallucinating. She had seizures on her way to the ED. In the ED she was intubated and heavily sedated.  No more seizures reported since then. UDS positive for amphetamine (may be FP due to Wellbutrin), BAL <10.  CT head with no acute changes.  EEG showed no epileptiform activity.     Patient is observed resting in bed this morning.  She is calm and cooperative during the exam.  Appears guarded and is a poor historian.  States she does not remember taking the pills.  \"Last thing I remember is going to bed. \"  Denies suicide attempt. Sav Arizmendi denies suicidal ideation at this time.  States she is not suicidal at home.  Admits to being depressed in the setting of her social stressors. Miriam Dawkins shares custody of her son with her ex. Miriam Dawkins is unemployed and looking for a job. Miriam Dawkins is staying with her parents. Miriam Dawkins denies mood swings and racing thoughts.  She denies auditory and visual hallucinations.  She reports poor sleep. Miriam Dawkins has been seeing a counselor in [address] [place] [address] she was planning to see a psychiatrist for med management - \"I need to get my Adderall. I have trouble concentrating\". Patient gave us permission to call her parents for collateral.\"     Patient has been seen in treatment team room. She reported that she feels stable today and her mood is \"good\". She endorses normal appetite and reported decreased quality of sleep. Patient continues to report decreased level of concentration and stated that she would like to restart previously prescribed Adderall. Other than that patient denies any other affective symptomatology. She denies current active suicidal or homicidal ideations, denies any plans.   Also, she denies any auditory and visual hallucinations.     Patient declined to consider any psychotropic medications for her mental health with exception of Adderall, and medication for insomnia.     PSYCHIATRIC HISTORY:    Diagnoses: Schizoaffective disorder  Suicide attempts/gestures: Cutting her wrist in 2012  Prior hospitalizations: Twice to [facility] in 2012 and 2017  Medication trials: Risperdal, Seroquel, Wellbutrin, Adderall  Mental health contact: Patient sees a counselor     Hospital Course:  Patient was admitted to the behavioral health floor and was acclimated to the unit. She was placed on suicide and seizure precautions. Labs were reviewed and physical exam was completed by Dr. Eric Dodd and associates. Home medications were reconciled. MEETA was obtained and reviewed. Patient was started on Remeron to help with depression, insomnia and poor appetite. She also received trazodone for sleep. Patient tolerated her medications well, without side effects, and was compliant. Patient attended and participated in groups. All interactions with the peers and staff members were appropriate. Behaviorally, she was not a problem. Sleep and appetite improved. There was no evidence of suicidality. Patient kept denying suicide attempt. With the above-mentioned medications changes as well as psychotherapeutic interventions, patient reported considerable improvement in her condition and requested discharge home. It was felt that patient was at her baseline. Patient was future-oriented and looking forward to seeing her son. The harmful effects of substances on physical and mental health were discussed with patient. Sobriety was encouraged. Patient denied the need for outpatient chemical dependency treatment. Patient has no access to guns at home. Patient's parents had no safety concerns and agreed to take patient back home. On 11/10/2020 it was therefore decided to discharge the patient, as it was felt that she received maximal benefit from her hospitalization. This patient is not suicidal, homicidal or psychotic at discharge. She does not present danger to self or others.       Number of antipsychotic medication prescribed at discharge: 0  IF MORE THAN ONE IS USED: NA    History of greater than 3 failed multiple monotherapy trials: NA  Monotherapy taper plan/ cross taper in progress: NA  Augmentation of Clozapine: NA    Referral to addiction treatment: patient refused    Prescription for Alcohol or Drug Auditory or Visual Hallucinations, NO Overt Delusions  Insight: Improved  Judgement: Improved  Memory is intact for both remote and recent  Intellectual Functioning:  Within the [address] of Knowledge:  Adequate  Attention and Concentration:  Adequate    Discharge Exam:  Please, see medical note    Disposition: home    Patient Instructions:   Current Discharge Medication List      START taking these medications    Details   mirtazapine (REMERON) 7.5 MG tablet Take 1 tablet by mouth nightly for 14 days  Qty: 14 tablet, Refills: 0      vitamin D (ERGOCALCIFEROL) 1.25 MG (35546 UT) CAPS capsule Take 1 capsule by mouth once a week for 14 days  Qty: 2 capsule, Refills: 0         CONTINUE these medications which have NOT CHANGED    Details   nicotine (NICODERM CQ) 21 MG/24HR Place 1 patch onto the skin every 24 hours         STOP taking these medications       ondansetron (ZOFRAN) 4 MG tablet Comments:   Reason for Stopping:               Activity: as tolerated  Diet: low fat, low cholesterol diet  Wound Care: none needed    Follow-up:    Schedule an appointment with Joaquin Service as soon as possible for a visit in 1 week(s)   Intake/therapy/medication mangement, please provide photo id, soc sec card, insurance card and list of current medications  [address] [facility]   Phone: [phone]   Fax: [phone]          Time worked: More than 31 minutes    Participation: good    Electronically signed by Blanca Stroud MD on 11/10/2020 at 10:02 AM

## 2020-11-10 NOTE — PROGRESS NOTES
[facility] Adult Unit Daily Assessment  Nursing Progress Note    Room: [facility]/604-01   Name: Yoly Ordaz   Age: 31 y.o. Gender: female   Dx: <principal problem not specified>  Precautions: suicide risk  Inpatient Status: voluntary       SLEEP:    Sleep Quality Fair  Sleep Medications: Trazodone, Remeron   PRN Sleep Meds: No       MEDICAL:    Other PRN Meds: No   Med Compliant: Yes  Accu-Chek: No  Oxygen/CPAP/BiPAP: No  CIWA/CINA: No   PAIN Assessment: none or not receiving treatment  Side Effects from medication: No      PSYCH:    Depression: 0   Anxiety: 0   SI denies suicidal ideation   HI Negative for homicidal ideation      AVH:Absent      GENERAL:    Appetite: good    Social: Yes   Speech: normal   Appearance: appropriately dressed and healthy looking    GROUP:    Group Participation: Yes  Participation Quality: Active Listener    Notes:     Patient has been in day area, social and interacting, voices no problems at this time. Continues to endorse she is feeling better, calm, cooperative with no behaviors noted. Patient reports that she is hoping to be discharged soon, that she has goals that she is working on.       Electronically signed by Alize Deluca LPN on 48/0/07 at 32:12 PM CST

## 2020-11-10 NOTE — PROGRESS NOTES
[facility]  Discharge Note  Bridge Appointment completed: Reviewed Discharge Instructions with patient. Patient verbalizes understanding and agreement with the discharge plan using the teachback method. Referral for Outpatient Tobacco Cessation Counseling, upon discharge (clifton X if applicable and completed):    ( )  Hospital staff assisted patient to call Quit Line or faxed referral                                   during hospitalization                  ( )  Recognizing danger situations (included triggers and roadblocks), if not completed on admission                    (x )  Coping skills (new ways to manage stress, exercise, relaxation techniques, changing routine, distraction), if not completed on admission                                                           ( )  Basic information about quitting (benefits of quitting, techniques in how to quit, available resources, if not completed on admission  ( ) Referral for counseling faxed to [facility] beds at the [facility]   ( ) Patient refused referral  ( ) Patient refused counseling  ( ) Patient refused smoking cessation medication upon discharge    Vaccinations (clifton X if applicable and completed):  ( ) Patient states already received influenza vaccine elsewhere  ( ) Patient received influenza vaccine during this hospitalization  (x ) Patient refused influenza vaccine at this time      Pt discharged with followings belongings:       Valuables sent home with patient. Valuables retrieved from [place] and returned to patient yes. Patient left department with  family via  car, discharged to  self care. Patient education on aftercare instructions: done  Patient verbalize understanding of AVS:  yes. Suicidal Ideations? No AVH? none HI?  Negative for homicidal ideation       Status EXAM upon discharge:  Status and Exam  Normal: Yes  Facial Expression: Brightened  Affect: Appropriate  Level of Consciousness: Alert  Mood:Normal: Yes  Mood: Anxious  Motor Activity:Normal: Yes  Interview Behavior: Cooperative  Preception: [place] to Person, [place] to Time, [place] to Place, [place] to Situation  Attention:Normal: Yes  Thought Processes: Circumstantial  Thought Content:Normal: Yes  Thought Content: Preoccupations  Hallucinations: None  Delusions: No  Memory:Normal: Yes  Memory: Poor Recent  Insight and Judgment: Yes  Insight and Judgment: Poor Insight, Poor Judgment  Present Suicidal Ideation: No  Present Homicidal Ideation: No     Patient calm and cooperative, states readiness to leave.

## 2020-11-11 NOTE — CARE COORDINATION
Follow up call completed. Writer was able to speak with the patient. Patient did have questions regarding their discharge. Pt had questions regarding her clothing that was not found at discharge. Pt was transferred to adult unit nursing station to discuss clothing issues.

## 2021-07-28 ENCOUNTER — HOSPITAL ENCOUNTER (EMERGENCY)
Age: 31
Discharge: LEFT BEFORE DISCHARGE | End: 2021-07-29
Attending: EMERGENCY MEDICINE
Payer: MEDICAID

## 2021-07-28 DIAGNOSIS — E87.6 HYPOKALEMIA: ICD-10-CM

## 2021-07-28 DIAGNOSIS — N17.9 AKI (ACUTE KIDNEY INJURY) (HCC): ICD-10-CM

## 2021-07-28 DIAGNOSIS — F19.10 SUBSTANCE ABUSE (HCC): Primary | ICD-10-CM

## 2021-07-28 DIAGNOSIS — F11.93 OPIOID WITHDRAWAL (HCC): ICD-10-CM

## 2021-07-28 LAB
ACETAMINOPHEN LEVEL: <15 UG/ML
ALBUMIN SERPL-MCNC: 4.8 G/DL (ref 3.5–5.2)
ALP BLD-CCNC: 68 U/L (ref 35–104)
ALT SERPL-CCNC: 33 U/L (ref 5–33)
AMPHETAMINE SCREEN, URINE: POSITIVE
ANION GAP SERPL CALCULATED.3IONS-SCNC: 15 MMOL/L (ref 7–19)
AST SERPL-CCNC: 23 U/L (ref 5–32)
BARBITURATE SCREEN URINE: NEGATIVE
BENZODIAZEPINE SCREEN, URINE: NEGATIVE
BILIRUB SERPL-MCNC: 0.3 MG/DL (ref 0.2–1.2)
BUN BLDV-MCNC: 11 MG/DL (ref 6–20)
CALCIUM SERPL-MCNC: 9.2 MG/DL (ref 8.6–10)
CANNABINOID SCREEN URINE: NEGATIVE
CHLORIDE BLD-SCNC: 104 MMOL/L (ref 98–111)
CO2: 22 MMOL/L (ref 22–29)
COCAINE METABOLITE SCREEN URINE: NEGATIVE
CREAT SERPL-MCNC: 1.2 MG/DL (ref 0.5–0.9)
ETHANOL: <10 MG/DL (ref 0–0.08)
GFR AFRICAN AMERICAN: >59
GFR NON-AFRICAN AMERICAN: 52
GLUCOSE BLD-MCNC: 127 MG/DL (ref 74–109)
HCG QUALITATIVE: NEGATIVE
HCT VFR BLD CALC: 40.1 % (ref 37–47)
HEMOGLOBIN: 13 G/DL (ref 12–16)
Lab: ABNORMAL
MCH RBC QN AUTO: 28.1 PG (ref 27–31)
MCHC RBC AUTO-ENTMCNC: 32.4 G/DL (ref 33–37)
MCV RBC AUTO: 86.8 FL (ref 81–99)
OPIATE SCREEN URINE: NEGATIVE
PDW BLD-RTO: 14.8 % (ref 11.5–14.5)
PLATELET # BLD: 302 K/UL (ref 130–400)
PMV BLD AUTO: 10 FL (ref 9.4–12.3)
POTASSIUM SERPL-SCNC: 3.3 MMOL/L (ref 3.5–5)
RBC # BLD: 4.62 M/UL (ref 4.2–5.4)
SALICYLATE, SERUM: <3 MG/DL (ref 3–10)
SODIUM BLD-SCNC: 141 MMOL/L (ref 136–145)
TOTAL CK: 234 U/L (ref 26–192)
TOTAL PROTEIN: 7.7 G/DL (ref 6.6–8.7)
WBC # BLD: 13.7 K/UL (ref 4.8–10.8)

## 2021-07-28 PROCEDURE — 85027 COMPLETE CBC AUTOMATED: CPT

## 2021-07-28 PROCEDURE — 99284 EMERGENCY DEPT VISIT MOD MDM: CPT

## 2021-07-28 PROCEDURE — 80307 DRUG TEST PRSMV CHEM ANLYZR: CPT

## 2021-07-28 PROCEDURE — 84703 CHORIONIC GONADOTROPIN ASSAY: CPT

## 2021-07-28 PROCEDURE — 82077 ASSAY SPEC XCP UR&BREATH IA: CPT

## 2021-07-28 PROCEDURE — 80179 DRUG ASSAY SALICYLATE: CPT

## 2021-07-28 PROCEDURE — 80053 COMPREHEN METABOLIC PANEL: CPT

## 2021-07-28 PROCEDURE — 82550 ASSAY OF CK (CPK): CPT

## 2021-07-28 PROCEDURE — 80143 DRUG ASSAY ACETAMINOPHEN: CPT

## 2021-07-28 PROCEDURE — 36415 COLL VENOUS BLD VENIPUNCTURE: CPT

## 2021-07-28 RX ORDER — 0.9 % SODIUM CHLORIDE 0.9 %
1000 INTRAVENOUS SOLUTION INTRAVENOUS ONCE
Status: COMPLETED | OUTPATIENT
Start: 2021-07-28 | End: 2021-07-29

## 2021-07-28 NOTE — LETTER
[facility] EMERGENCY DEPT  [address]  Phone: [phone]    Patient: Eran Hubbardeman: 1990  Date: 7/29/2021 Time: 12:52 AM    Leaving the Hospital Against Medical Advice    Chart #:[id number]    This will certify that I, the undersigned,    ______________________________________________________________________    A patient in the above named medical center, having requested discharge and removal from the medical [place] against the advice of my attending physician(s), hereby release the Emergency Department, its physicians, officers and employees, severally and individually, from any and all liability of any nature whatsoever for any injury or harm or complication of any kind that may result directly or indirectly, by reason of my terminating my stay as a patient from [facility], and hereby waive any and all rights of action I may now have or later acquire as a result of my voluntary departure from [facility] and the termination of my stay as a patient therein. This release is made with the full knowledge of the danger that may result from the action which I am taking.       Date:_______________________                         ___________________________                                                                                    Patient/Legal Representative    Witness:        ____________________________                          ___________________________  Nurse                                                                        Physician

## 2021-07-29 VITALS
HEIGHT: 70 IN | OXYGEN SATURATION: 96 % | DIASTOLIC BLOOD PRESSURE: 85 MMHG | SYSTOLIC BLOOD PRESSURE: 142 MMHG | TEMPERATURE: 98 F | RESPIRATION RATE: 16 BRPM | WEIGHT: 209 LBS | BODY MASS INDEX: 29.92 KG/M2 | HEART RATE: 107 BPM

## 2021-07-29 PROCEDURE — 2580000003 HC RX 258: Performed by: EMERGENCY MEDICINE

## 2021-07-29 RX ADMIN — SODIUM CHLORIDE 1000 ML: 9 INJECTION, SOLUTION INTRAVENOUS at 00:22

## 2021-07-29 ASSESSMENT — ENCOUNTER SYMPTOMS
VOMITING: 0
DIARRHEA: 0
ABDOMINAL PAIN: 0
EYE PAIN: 0
SHORTNESS OF BREATH: 0
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
## 2021-07-29 NOTE — ED NOTES
Pt states that the IV was starting to burn and so she pulled the IV out herself. Pt states \"I'm good, no more. I'm done. \"  Notified Dr. Leigh Good.      [facility]  07/29/21 7430
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
## 2021-07-29 NOTE — ED NOTES
Bed: 16  Expected date:   Expected time:   Means of arrival:   Comments:  Adelaide Alfaro  07/28/21 7869

## 2023-04-22 ENCOUNTER — HOSPITAL ENCOUNTER (INPATIENT)
Age: 33
LOS: 4 days | Discharge: HOME | DRG: 751 | End: 2023-04-26
Attending: EMERGENCY MEDICINE | Admitting: PSYCHIATRY & NEUROLOGY
Payer: MEDICAID

## 2023-04-22 DIAGNOSIS — R45.851 DEPRESSION WITH SUICIDAL IDEATION: Primary | ICD-10-CM

## 2023-04-22 DIAGNOSIS — F32.A DEPRESSION WITH SUICIDAL IDEATION: Primary | ICD-10-CM

## 2023-04-22 LAB
ALBUMIN SERPL-MCNC: 4.6 G/DL (ref 3.5–5.2)
ALP SERPL-CCNC: 70 U/L (ref 35–104)
ALT SERPL-CCNC: 20 U/L (ref 5–33)
AMPHET UR QL SCN: NEGATIVE
ANION GAP SERPL CALCULATED.3IONS-SCNC: 12 MMOL/L (ref 7–19)
AST SERPL-CCNC: 16 U/L (ref 5–32)
BACTERIA URNS QL MICRO: NEGATIVE /HPF
BARBITURATES UR QL SCN: NEGATIVE
BASOPHILS # BLD: 0.1 K/UL (ref 0–0.2)
BASOPHILS NFR BLD: 0.5 % (ref 0–1)
BENZODIAZ UR QL SCN: NEGATIVE
BILIRUB SERPL-MCNC: 0.3 MG/DL (ref 0.2–1.2)
BILIRUB UR QL STRIP: NEGATIVE
BUN SERPL-MCNC: 7 MG/DL (ref 6–20)
BUPRENORPHINE URINE: NEGATIVE
CALCIUM SERPL-MCNC: 9 MG/DL (ref 8.6–10)
CANNABINOIDS UR QL SCN: NEGATIVE
CHLORIDE SERPL-SCNC: 104 MMOL/L (ref 98–111)
CLARITY UR: CLEAR
CO2 SERPL-SCNC: 25 MMOL/L (ref 22–29)
COCAINE UR QL SCN: NEGATIVE
COLOR UR: YELLOW
CREAT SERPL-MCNC: 0.8 MG/DL (ref 0.5–0.9)
CRYSTALS URNS MICRO: ABNORMAL /HPF
DRUG SCREEN COMMENT UR-IMP: ABNORMAL
EOSINOPHIL # BLD: 0.1 K/UL (ref 0–0.6)
EOSINOPHIL NFR BLD: 0.5 % (ref 0–5)
EPI CELLS #/AREA URNS AUTO: 7 /HPF (ref 0–5)
ERYTHROCYTE [DISTWIDTH] IN BLOOD BY AUTOMATED COUNT: 13.6 % (ref 11.5–14.5)
ETHANOLAMINE SERPL-MCNC: <10 MG/DL (ref 0–0.08)
GLUCOSE SERPL-MCNC: 117 MG/DL (ref 74–109)
GLUCOSE UR STRIP.AUTO-MCNC: NEGATIVE MG/DL
HCG SERPL QL: NEGATIVE
HCT VFR BLD AUTO: 43.2 % (ref 37–47)
HGB BLD-MCNC: 13.9 G/DL (ref 12–16)
HGB UR STRIP.AUTO-MCNC: NEGATIVE MG/L
HYALINE CASTS #/AREA URNS AUTO: 5 /HPF (ref 0–8)
IMM GRANULOCYTES # BLD: 0 K/UL
KETONES UR STRIP.AUTO-MCNC: ABNORMAL MG/DL
LEUKOCYTE ESTERASE UR QL STRIP.AUTO: NEGATIVE
LYMPHOCYTES # BLD: 2.8 K/UL (ref 1.1–4.5)
LYMPHOCYTES NFR BLD: 20.1 % (ref 20–40)
MCH RBC QN AUTO: 29.1 PG (ref 27–31)
MCHC RBC AUTO-ENTMCNC: 32.2 G/DL (ref 33–37)
MCV RBC AUTO: 90.6 FL (ref 81–99)
METHADONE UR QL SCN: NEGATIVE
METHAMPHETAMINE, URINE: NEGATIVE
MONOCYTES # BLD: 0.6 K/UL (ref 0–0.9)
MONOCYTES NFR BLD: 4.5 % (ref 0–10)
NEUTROPHILS # BLD: 10.3 K/UL (ref 1.5–7.5)
NEUTS SEG NFR BLD: 74.2 % (ref 50–65)
NITRITE UR QL STRIP.AUTO: NEGATIVE
OPIATES UR QL SCN: NEGATIVE
OXYCODONE UR QL SCN: POSITIVE
PCP UR QL SCN: NEGATIVE
PH UR STRIP.AUTO: 6 [PH] (ref 5–8)
PLATELET # BLD AUTO: 315 K/UL (ref 130–400)
PMV BLD AUTO: 9.8 FL (ref 9.4–12.3)
POTASSIUM SERPL-SCNC: 3.8 MMOL/L (ref 3.5–5)
PROPOXYPH UR QL SCN: NEGATIVE
PROT SERPL-MCNC: 7.9 G/DL (ref 6.6–8.7)
PROT UR STRIP.AUTO-MCNC: 30 MG/DL
RBC # BLD AUTO: 4.77 M/UL (ref 4.2–5.4)
RBC #/AREA URNS AUTO: 7 /HPF (ref 0–4)
SARS-COV-2 RDRP RESP QL NAA+PROBE: NOT DETECTED
SODIUM SERPL-SCNC: 141 MMOL/L (ref 136–145)
SP GR UR STRIP.AUTO: 1.02 (ref 1–1.03)
TRICYCLIC, URINE: NEGATIVE
UROBILINOGEN UR STRIP.AUTO-MCNC: 1 E.U./DL
WBC # BLD AUTO: 13.8 K/UL (ref 4.8–10.8)
WBC #/AREA URNS AUTO: 1 /HPF (ref 0–5)

## 2023-04-22 PROCEDURE — 82077 ASSAY SPEC XCP UR&BREATH IA: CPT

## 2023-04-22 PROCEDURE — 36415 COLL VENOUS BLD VENIPUNCTURE: CPT

## 2023-04-22 PROCEDURE — 6370000000 HC RX 637 (ALT 250 FOR IP): Performed by: PSYCHIATRY & NEUROLOGY

## 2023-04-22 PROCEDURE — 85025 COMPLETE CBC W/AUTO DIFF WBC: CPT

## 2023-04-22 PROCEDURE — 99285 EMERGENCY DEPT VISIT HI MDM: CPT

## 2023-04-22 PROCEDURE — 80306 DRUG TEST PRSMV INSTRMNT: CPT

## 2023-04-22 PROCEDURE — 84703 CHORIONIC GONADOTROPIN ASSAY: CPT

## 2023-04-22 PROCEDURE — 1240000000 HC EMOTIONAL WELLNESS R&B

## 2023-04-22 PROCEDURE — 81001 URINALYSIS AUTO W/SCOPE: CPT

## 2023-04-22 PROCEDURE — 80053 COMPREHEN METABOLIC PANEL: CPT

## 2023-04-22 PROCEDURE — 87635 SARS-COV-2 COVID-19 AMP PRB: CPT

## 2023-04-22 RX ORDER — ACETAMINOPHEN 325 MG/1
650 TABLET ORAL EVERY 4 HOURS PRN
Status: DISCONTINUED | OUTPATIENT
Start: 2023-04-22 | End: 2023-04-26 | Stop reason: HOSPADM

## 2023-04-22 RX ORDER — MECOBALAMIN 5000 MCG
5 TABLET,DISINTEGRATING ORAL NIGHTLY
Status: DISCONTINUED | OUTPATIENT
Start: 2023-04-22 | End: 2023-04-26 | Stop reason: HOSPADM

## 2023-04-22 RX ORDER — NICOTINE 21 MG/24HR
1 PATCH, TRANSDERMAL 24 HOURS TRANSDERMAL DAILY
Status: DISCONTINUED | OUTPATIENT
Start: 2023-04-23 | End: 2023-04-26 | Stop reason: HOSPADM

## 2023-04-22 RX ORDER — POLYETHYLENE GLYCOL 3350 17 G/17G
17 POWDER, FOR SOLUTION ORAL DAILY PRN
Status: DISCONTINUED | OUTPATIENT
Start: 2023-04-22 | End: 2023-04-26 | Stop reason: HOSPADM

## 2023-04-22 RX ORDER — VILOXAZINE HYDROCHLORIDE 150 MG/1
150 CAPSULE, EXTENDED RELEASE ORAL
Status: ON HOLD | COMMUNITY
End: 2023-04-26 | Stop reason: HOSPADM

## 2023-04-22 RX ORDER — HYDROXYZINE HYDROCHLORIDE 25 MG/1
25 TABLET, FILM COATED ORAL 3 TIMES DAILY PRN
Status: DISCONTINUED | OUTPATIENT
Start: 2023-04-22 | End: 2023-04-23

## 2023-04-22 RX ORDER — TRAZODONE HYDROCHLORIDE 50 MG/1
50 TABLET ORAL NIGHTLY
Status: DISCONTINUED | OUTPATIENT
Start: 2023-04-22 | End: 2023-04-23

## 2023-04-22 RX ADMIN — TRAZODONE HYDROCHLORIDE 50 MG: 50 TABLET ORAL at 23:39

## 2023-04-22 RX ADMIN — NICOTINE POLACRILEX 2 MG: 2 GUM, CHEWING BUCCAL at 23:45

## 2023-04-22 RX ADMIN — HYDROXYZINE HYDROCHLORIDE 25 MG: 25 TABLET, FILM COATED ORAL at 23:39

## 2023-04-22 RX ADMIN — ACETAMINOPHEN 650 MG: 325 TABLET ORAL at 23:38

## 2023-04-22 RX ADMIN — Medication 5 MG: at 23:39

## 2023-04-22 ASSESSMENT — PAIN DESCRIPTION - LOCATION: LOCATION: GENERALIZED

## 2023-04-22 ASSESSMENT — PAIN - FUNCTIONAL ASSESSMENT
PAIN_FUNCTIONAL_ASSESSMENT: ACTIVITIES ARE NOT PREVENTED
PAIN_FUNCTIONAL_ASSESSMENT: NONE - DENIES PAIN

## 2023-04-22 ASSESSMENT — PAIN DESCRIPTION - DESCRIPTORS: DESCRIPTORS: ACHING;DISCOMFORT

## 2023-04-22 ASSESSMENT — PAIN SCALES - GENERAL: PAINLEVEL_OUTOF10: 4

## 2023-04-22 ASSESSMENT — PAIN DESCRIPTION - ORIENTATION: ORIENTATION: OTHER (COMMENT)

## 2023-04-23 PROBLEM — F29 PSYCHOSIS, UNSPECIFIED PSYCHOSIS TYPE (HCC): Status: ACTIVE | Noted: 2023-04-23

## 2023-04-23 PROBLEM — R45.851 DEPRESSION WITH SUICIDAL IDEATION: Status: ACTIVE | Noted: 2020-11-07

## 2023-04-23 PROCEDURE — 1240000000 HC EMOTIONAL WELLNESS R&B

## 2023-04-23 PROCEDURE — 6370000000 HC RX 637 (ALT 250 FOR IP): Performed by: PSYCHIATRY & NEUROLOGY

## 2023-04-23 RX ORDER — HYDROXYZINE HYDROCHLORIDE 25 MG/1
25 TABLET, FILM COATED ORAL 3 TIMES DAILY PRN
Status: DISCONTINUED | OUTPATIENT
Start: 2023-04-23 | End: 2023-04-26 | Stop reason: HOSPADM

## 2023-04-23 RX ORDER — TRAZODONE HYDROCHLORIDE 100 MG/1
100 TABLET ORAL NIGHTLY
Status: DISCONTINUED | OUTPATIENT
Start: 2023-04-23 | End: 2023-04-26 | Stop reason: HOSPADM

## 2023-04-23 RX ORDER — RISPERIDONE 0.25 MG/1
0.5 TABLET ORAL 2 TIMES DAILY
Status: DISCONTINUED | OUTPATIENT
Start: 2023-04-23 | End: 2023-04-24

## 2023-04-23 RX ADMIN — NICOTINE POLACRILEX 2 MG: 2 GUM, CHEWING BUCCAL at 14:51

## 2023-04-23 RX ADMIN — Medication 5 MG: at 20:48

## 2023-04-23 RX ADMIN — TRAZODONE HYDROCHLORIDE 100 MG: 100 TABLET ORAL at 20:49

## 2023-04-23 RX ADMIN — NICOTINE POLACRILEX 2 MG: 2 GUM, CHEWING BUCCAL at 18:12

## 2023-04-23 RX ADMIN — NICOTINE POLACRILEX 2 MG: 2 GUM, CHEWING BUCCAL at 08:55

## 2023-04-23 RX ADMIN — RISPERIDONE 0.5 MG: 0.25 TABLET, FILM COATED ORAL at 08:55

## 2023-04-23 RX ADMIN — HYDROXYZINE HYDROCHLORIDE 25 MG: 25 TABLET, FILM COATED ORAL at 20:49

## 2023-04-23 RX ADMIN — RISPERIDONE 0.5 MG: 0.25 TABLET, FILM COATED ORAL at 20:48

## 2023-04-23 ASSESSMENT — SLEEP AND FATIGUE QUESTIONNAIRES
AVERAGE NUMBER OF SLEEP HOURS: 4
DO YOU USE A SLEEP AID: NO
DO YOU HAVE DIFFICULTY SLEEPING: YES
SLEEP PATTERN: DIFFICULTY FALLING ASLEEP;RESTLESSNESS

## 2023-04-23 ASSESSMENT — LIFESTYLE VARIABLES
HOW MANY STANDARD DRINKS CONTAINING ALCOHOL DO YOU HAVE ON A TYPICAL DAY: 1 OR 2
HOW OFTEN DO YOU HAVE A DRINK CONTAINING ALCOHOL: MONTHLY OR LESS

## 2023-04-23 ASSESSMENT — ENCOUNTER SYMPTOMS
SHORTNESS OF BREATH: 0
GASTROINTESTINAL NEGATIVE: 1
BACK PAIN: 0
SORE THROAT: 0
VOMITING: 0
DIARRHEA: 0
COUGH: 0
RESPIRATORY NEGATIVE: 1
ABDOMINAL PAIN: 0
RHINORRHEA: 0
NAUSEA: 0

## 2023-04-23 ASSESSMENT — PAIN SCALES - GENERAL: PAINLEVEL_OUTOF10: 0

## 2023-04-23 ASSESSMENT — PATIENT HEALTH QUESTIONNAIRE - PHQ9: SUM OF ALL RESPONSES TO PHQ QUESTIONS 1-9: 23

## 2023-04-24 LAB
25(OH)D3 SERPL-MCNC: 15.1 NG/ML
CHOLEST SERPL-MCNC: 145 MG/DL (ref 160–199)
HBA1C MFR BLD: 5 % (ref 4–6)
HDLC SERPL-MCNC: 32 MG/DL (ref 65–121)
LDLC SERPL CALC-MCNC: 93 MG/DL
TRIGL SERPL-MCNC: 102 MG/DL (ref 0–149)
TSH SERPL DL<=0.005 MIU/L-ACNC: 1.63 UIU/ML (ref 0.27–4.2)
VIT B12 SERPL-MCNC: 614 PG/ML (ref 211–946)

## 2023-04-24 PROCEDURE — 6370000000 HC RX 637 (ALT 250 FOR IP): Performed by: FAMILY MEDICINE

## 2023-04-24 PROCEDURE — 6370000000 HC RX 637 (ALT 250 FOR IP): Performed by: PSYCHIATRY & NEUROLOGY

## 2023-04-24 PROCEDURE — 84443 ASSAY THYROID STIM HORMONE: CPT

## 2023-04-24 PROCEDURE — 83036 HEMOGLOBIN GLYCOSYLATED A1C: CPT

## 2023-04-24 PROCEDURE — 82607 VITAMIN B-12: CPT

## 2023-04-24 PROCEDURE — 82306 VITAMIN D 25 HYDROXY: CPT

## 2023-04-24 PROCEDURE — 36415 COLL VENOUS BLD VENIPUNCTURE: CPT

## 2023-04-24 PROCEDURE — 1240000000 HC EMOTIONAL WELLNESS R&B

## 2023-04-24 PROCEDURE — 80061 LIPID PANEL: CPT

## 2023-04-24 RX ORDER — RISPERIDONE 1 MG/1
1 TABLET ORAL NIGHTLY
Status: DISCONTINUED | OUTPATIENT
Start: 2023-04-24 | End: 2023-04-26 | Stop reason: HOSPADM

## 2023-04-24 RX ORDER — RISPERIDONE 0.25 MG/1
0.5 TABLET ORAL DAILY
Status: DISCONTINUED | OUTPATIENT
Start: 2023-04-25 | End: 2023-04-26 | Stop reason: HOSPADM

## 2023-04-24 RX ORDER — ERGOCALCIFEROL 1.25 MG/1
50000 CAPSULE ORAL WEEKLY
Status: DISCONTINUED | OUTPATIENT
Start: 2023-04-24 | End: 2023-04-26 | Stop reason: HOSPADM

## 2023-04-24 RX ADMIN — HYDROXYZINE HYDROCHLORIDE 25 MG: 25 TABLET, FILM COATED ORAL at 20:08

## 2023-04-24 RX ADMIN — RISPERIDONE 1 MG: 1 TABLET ORAL at 20:07

## 2023-04-24 RX ADMIN — ERGOCALCIFEROL 50000 UNITS: 1.25 CAPSULE ORAL at 14:06

## 2023-04-24 RX ADMIN — NICOTINE POLACRILEX 2 MG: 2 GUM, CHEWING BUCCAL at 14:06

## 2023-04-24 RX ADMIN — RISPERIDONE 0.5 MG: 0.25 TABLET, FILM COATED ORAL at 07:44

## 2023-04-24 RX ADMIN — Medication 5 MG: at 20:08

## 2023-04-24 RX ADMIN — TRAZODONE HYDROCHLORIDE 100 MG: 100 TABLET ORAL at 20:08

## 2023-04-24 RX ADMIN — NICOTINE POLACRILEX 2 MG: 2 GUM, CHEWING BUCCAL at 09:07

## 2023-04-24 NOTE — PLAN OF CARE
Problem: Coping  Goal: Pt/Family able to verbalize concerns and demonstrate effective coping strategies  Description: INTERVENTIONS:  1. Assist patient/family to identify coping skills, available support systems and cultural and spiritual values  2. Provide emotional support, including active listening and acknowledgement of concerns of patient and caregivers  3. Reduce environmental stimuli, as able  4. Instruct patient/family in relaxation techniques, as appropriate  5. Assess for spiritual pain/suffering and initiate Spiritual Care, Psychosocial Clinical Specialist consults as needed  Outcome: Progressing  Note:                                                                     Group Therapy Note    Date: 4/24/2023  Start Time: 0930  End Time:  1000  Number of Participants: 7    Type of Group: Psychoeducation    Wellness Binder Information  Module Name:  Relapse Prevention  Session Number:  5    Group Goal for Pt: To improve knowledge of relapse prevention strategies    Notes: Pt demonstrated improved knowledge of relapse prevention strategies by actively participating in group discussion. Status After Intervention:  Unchanged    Participation Level:  Active Listener and Interactive    Participation Quality: Appropriate and Attentive      Speech:  normal      Thought Process/Content: Logical      Affective Functioning: Congruent      Mood: anxious and depressed      Level of consciousness:  Alert and Oriented x4      Response to Learning: Able to verbalize current knowledge/experience, Able to verbalize/acknowledge new learning, and Progressing to goal      Endings: None Reported    Modes of Intervention: Education      Discipline Responsible: Psychoeducational Specialist      Signature:  Keyon Aguirre

## 2023-04-24 NOTE — PLAN OF CARE
Problem: Anxiety  Goal: Will report anxiety at manageable levels  Outcome: Progressing     Problem: Decision Making  Goal: Pt/Family able to effectively weigh alternatives and participate in decision making related to treatment and care  Outcome: Progressing     Problem: Confusion  Goal: Confusion, delirium, dementia, or psychosis is improved or at baseline  Outcome: Progressing     Problem: Behavior  Goal: Pt/Family maintain appropriate behavior and adhere to behavioral management agreement, if implemented  Outcome: Progressing     Problem: Depression/Self Harm  Goal: Effect of psychiatric condition will be minimized and patient will be protected from self harm  Outcome: Progressing     Problem: Drug Abuse/Detox  Goal: Will have no detox symptoms and will verbalize plan for changing drug-related behavior  Outcome: Progressing     Problem: Pain  Goal: Verbalizes/displays adequate comfort level or baseline comfort level  Outcome: Progressing     Problem: Safety - Adult  Goal: Free from fall injury  Outcome: Progressing

## 2023-04-24 NOTE — PLAN OF CARE
Group Therapy Note     Date: 4/24/2023  Start Time: 1100  End Time:  5125  Number of Participants: 4     Type of Group: Psychotherapy     Wellness Binder Information  Module Name:  emotional wellness  Session Number:  1     Patient's Goal:  validation of feelings     Notes:  pt was verbally prompted to attend group. Pt refused. Information about emotional wellness was provided. Status After Intervention:       Participation Level:      Participation Quality:         Speech:           Thought Process/Content:         Affective Functioning:         Mood:         Level of consciousness:          Response to Learning:         Endings:      Modes of Intervention:         Discipline Responsible: Psychoeducational Specialist        Signature:  Mariano Landaverde

## 2023-04-25 PROCEDURE — 1240000000 HC EMOTIONAL WELLNESS R&B

## 2023-04-25 PROCEDURE — 6370000000 HC RX 637 (ALT 250 FOR IP): Performed by: PSYCHIATRY & NEUROLOGY

## 2023-04-25 RX ORDER — POLYETHYLENE GLYCOL 3350 17 G
2 POWDER IN PACKET (EA) ORAL
Status: DISCONTINUED | OUTPATIENT
Start: 2023-04-25 | End: 2023-04-26 | Stop reason: HOSPADM

## 2023-04-25 RX ADMIN — RISPERIDONE 1 MG: 1 TABLET ORAL at 20:29

## 2023-04-25 RX ADMIN — NICOTINE POLACRILEX 2 MG: 2 GUM, CHEWING BUCCAL at 07:57

## 2023-04-25 RX ADMIN — RISPERIDONE 0.5 MG: 0.25 TABLET, FILM COATED ORAL at 07:56

## 2023-04-25 RX ADMIN — Medication 5 MG: at 20:29

## 2023-04-25 RX ADMIN — NICOTINE POLACRILEX 2 MG: 2 LOZENGE ORAL at 12:09

## 2023-04-25 RX ADMIN — TRAZODONE HYDROCHLORIDE 100 MG: 100 TABLET ORAL at 20:30

## 2023-04-25 NOTE — PLAN OF CARE
Problem: Anxiety  Goal: Will report anxiety at manageable levels  4/25/2023 1145 by Sukhdeep Mahoney  Outcome: Progressing    Group Therapy Note     Date: 4/25/2023  Start Time: 1100  End Time:  7244  Number of Participants: 11     Type of Group: Psychoeducation     Wellness Binder Information  Module Name:  emotional wellness  Session Number:  5     Patient's Goal:  obstacles to emotional wellness     Notes:  pt acknowledged negative thinking as an obstacle to emotional wellness.      Status After Intervention:  Improved     Participation Level: Interactive     Participation Quality: Appropriate, Attentive, and Sharing        Speech:  normal        Thought Process/Content: Logical        Affective Functioning: Congruent        Mood: congruent        Level of consciousness:  Alert, Oriented x4, and Attentive        Response to Learning: Able to verbalize current knowledge/experience        Endings: None Reported     Modes of Intervention: Education        Discipline Responsible: Psychoeducational Specialist        Signature:  Sukhdeep Mahoney

## 2023-04-25 NOTE — PLAN OF CARE
Problem: Anxiety  Goal: Will report anxiety at manageable levels  Outcome: Progressing     Problem: Decision Making  Goal: Pt/Family able to effectively weigh alternatives and participate in decision making related to treatment and care  Outcome: Progressing     Problem: Confusion  Goal: Confusion, delirium, dementia, or psychosis is improved or at baseline  Outcome: Progressing     Problem: Behavior  Goal: Pt/Family maintain appropriate behavior and adhere to behavioral management agreement, if implemented  Outcome: Progressing     Problem: Depression/Self Harm  Goal: Effect of psychiatric condition will be minimized and patient will be protected from self harm  Outcome: Progressing     Problem: Drug Abuse/Detox  Goal: Will have no detox symptoms and will verbalize plan for changing drug-related behavior  Outcome: Progressing     Problem: Pain  Goal: Verbalizes/displays adequate comfort level or baseline comfort level  Outcome: Progressing     Problem: Safety - Adult  Goal: Free from fall injury  Outcome: Progressing     Problem: Coping  Goal: Pt/Family able to verbalize concerns and demonstrate effective coping strategies  Outcome: Progressing

## 2023-04-26 VITALS
HEIGHT: 68 IN | DIASTOLIC BLOOD PRESSURE: 68 MMHG | BODY MASS INDEX: 29.04 KG/M2 | SYSTOLIC BLOOD PRESSURE: 111 MMHG | WEIGHT: 191.6 LBS | OXYGEN SATURATION: 96 % | HEART RATE: 90 BPM | RESPIRATION RATE: 18 BRPM | TEMPERATURE: 97.3 F

## 2023-04-26 PROCEDURE — 5130000000 HC BRIDGE APPOINTMENT

## 2023-04-26 PROCEDURE — 6370000000 HC RX 637 (ALT 250 FOR IP): Performed by: PSYCHIATRY & NEUROLOGY

## 2023-04-26 RX ORDER — TRAZODONE HYDROCHLORIDE 100 MG/1
100 TABLET ORAL NIGHTLY
Qty: 14 TABLET | Refills: 0 | Status: SHIPPED | OUTPATIENT
Start: 2023-04-26

## 2023-04-26 RX ORDER — RISPERIDONE 1 MG/1
1 TABLET ORAL NIGHTLY
Qty: 14 TABLET | Refills: 0 | Status: SHIPPED | OUTPATIENT
Start: 2023-04-26

## 2023-04-26 RX ORDER — ERGOCALCIFEROL 1.25 MG/1
50000 CAPSULE ORAL WEEKLY
Qty: 2 CAPSULE | Refills: 0 | Status: SHIPPED | OUTPATIENT
Start: 2023-04-26 | End: 2023-05-10

## 2023-04-26 RX ORDER — MECOBALAMIN 5000 MCG
5 TABLET,DISINTEGRATING ORAL NIGHTLY
Qty: 14 TABLET | Refills: 0 | Status: SHIPPED | OUTPATIENT
Start: 2023-04-26

## 2023-04-26 RX ORDER — RISPERIDONE 0.5 MG/1
0.5 TABLET ORAL DAILY
Qty: 14 TABLET | Refills: 0 | Status: SHIPPED | OUTPATIENT
Start: 2023-04-27

## 2023-04-26 RX ORDER — NICOTINE 21 MG/24HR
1 PATCH, TRANSDERMAL 24 HOURS TRANSDERMAL DAILY
Qty: 14 PATCH | Refills: 0 | Status: SHIPPED | OUTPATIENT
Start: 2023-04-27

## 2023-04-26 RX ORDER — HYDROXYZINE HYDROCHLORIDE 25 MG/1
25 TABLET, FILM COATED ORAL 3 TIMES DAILY PRN
Qty: 42 TABLET | Refills: 0 | Status: SHIPPED | OUTPATIENT
Start: 2023-04-26

## 2023-04-26 RX ADMIN — NICOTINE POLACRILEX 2 MG: 2 LOZENGE ORAL at 10:49

## 2023-04-26 RX ADMIN — RISPERIDONE 0.5 MG: 0.25 TABLET, FILM COATED ORAL at 08:02

## 2023-04-26 NOTE — DISCHARGE INSTR - DIET

## 2023-04-26 NOTE — PLAN OF CARE
Problem: Anxiety  Goal: Will report anxiety at manageable levels  Description: INTERVENTIONS:  1. Administer medication as ordered  2. Teach and rehearse alternative coping skills  3. Provide emotional support with 1:1 interaction with staff  Outcome: Completed     Problem: Decision Making  Goal: Pt/Family able to effectively weigh alternatives and participate in decision making related to treatment and care  Description: INTERVENTIONS:  1. Determine when there are differences between patient's view, family's view, and healthcare provider's view of condition  2. Facilitate patient and family articulation of goals for care  3. Help patient and family identify pros/cons of alternative solutions  4. Provide information as requested by patient/family  5. Respect patient/family right to receive or not to receive information  6. Serve as a liaison between patient and family and health care team  7. Initiate Consults from Ethics, Palliative Care or initiate [address] [place] [address] as is appropriate  Outcome: Completed     Problem: Confusion  Goal: Confusion, delirium, dementia, or psychosis is improved or at baseline  Description: INTERVENTIONS:  1. Assess for possible contributors to thought disturbance, including medications, impaired vision or hearing, underlying metabolic abnormalities, dehydration, psychiatric diagnoses, and notify attending LIP  2. [place] high risk fall precautions, as indicated  3. Provide frequent short contacts to provide reality reorientation, refocusing and direction  4. Decrease environmental stimuli, including noise as appropriate  5. Monitor and intervene to maintain adequate nutrition, hydration, elimination, sleep and activity  6. If unable to ensure safety without constant attention obtain sitter and review sitter guidelines with assigned personnel  7.  Initiate Psychosocial CNS and Spiritual Care consult, as indicated  Outcome: Completed     Problem: Behavior  Goal: Pt/Family maintain

## 2023-04-26 NOTE — PLAN OF CARE
Group Therapy Note    Date: 4/26/2023  Start Time: 1000  End Time:  1030  Number of Participants: 11    Type of Group: Psychoeducation    Wellness Binder Information  Module Name:  Women's Issues  Session Number:  4    Group Goal for Pt: To raise awareness of how thoughts influence feelings    Notes:  Pt demonstrated improved awareness of how thoughts influence feelings by actively participating in group discussion. Status After Intervention:  Unchanged    Participation Level:  Active Listener    Participation Quality: Appropriate and Attentive      Speech:  normal      Thought Process/Content: Logical      Affective Functioning: Congruent      Mood: anxious and depressed      Level of consciousness:  Alert and Oriented x4      Response to Learning: Able to verbalize current knowledge/experience, Able to verbalize/acknowledge new learning, and Progressing to goal      Endings: None Reported    Modes of Intervention: Education      Discipline Responsible: Psychoeducational Specialist      Signature:  Spenser Wells

## 2023-04-26 NOTE — DISCHARGE INSTRUCTIONS
Medications:   Medication Summary Provided. I understand that I should take only the medications on my list.   --why and when I need to take each medication. --which side effects to watch for.   --that I should carry my medication information at all times in case of emergency    Situations. --I will take all medications until discontinued by physician. I will take all my medications to follow up appointments. --check with my physician or pharmacist before taking any new medication, over    the counter product or drink alcohol.   --ask about food, drug and dietary supplement interactions. --discard old lists and update records with medication providers. Behavior Health Follow Up:  Original Referral Source: ED  Discharge Diagnosis:   Recomendations for Level of Care: Follow Up  Patient Status at Discharge: Stable  My Hospital  was: [facility]  Aftercare plan faxed:    Faxed by: Social Work staff   Date: 23  Prescriptions sent with pt.     General Information:   Questions regarding your bill: Call [phone]   Suicide Hotline (Rescue Crisis) 8-909.494.2790   To obtain results of pending studies call Medical Records at: [phone]   For emergencies and 24 hour/7 days a week contact information: [phone]

## 2023-04-26 NOTE — PLAN OF CARE
Problem: Coping  Goal: Pt/Family able to verbalize concerns and demonstrate effective coping strategies  Description: INTERVENTIONS:  1. Assist patient/family to identify coping skills, available support systems and cultural and spiritual values  2. Provide emotional support, including active listening and acknowledgement of concerns of patient and caregivers  3. Reduce environmental stimuli, as able  4. Instruct patient/family in relaxation techniques, as appropriate  5. Assess for spiritual pain/suffering and initiate Spiritual Care, Psychosocial Clinical Specialist consults as needed  4/26/2023 1024 by Juno Esquivel RN  Outcome: Completed                                                                       Group Therapy Note    Date: 4/26/2023  Start Time: 1000  End Time:  2117  Number of Participants: 8    Type of Group: Psychotherapy    Patient's Goal: Patient will process emotions and actions and how to relate to other or their with others. Patient discussed open communication and feelings and emotions. Notes:  Patient attended process group as scheduled, patient identified a issue to work on today regarding how they will interact and relate to others. Status After Intervention:  Improved    Participation Level:  Active Listener    Participation Quality: Appropriate, Attentive, and Sharing      Speech:  normal     Thought Process/Content: Logical      Affective Functioning: Congruent      Mood: euthymic      Level of consciousness:  Alert      Response to Learning: Able to verbalize current knowledge/experience      Endings: None Reported    Modes of Intervention: Education, Support, and Socialization      Discipline Responsible: /Counselor      Signature:  [facility]

## 2023-04-26 NOTE — DISCHARGE SUMMARY
04/22/2023     Lab Results   Component Value Date     04/22/2023    K 3.8 04/22/2023     04/22/2023    CO2 25 04/22/2023    BUN 7 04/22/2023    CREATININE 0.8 04/22/2023    GLUCOSE 117 (H) 04/22/2023    CALCIUM 9.0 04/22/2023    PROT 7.9 04/22/2023    LABALBU 4.6 04/22/2023    BILITOT 0.3 04/22/2023    ALKPHOS 70 04/22/2023    AST 16 04/22/2023    ALT 20 04/22/2023    LABGLOM >60 04/22/2023    GFRAA >59 07/28/2021    GLOB 2.6 02/16/2017       Lab Results   Component Value Date    HNHPTMGA09 614 04/24/2023     Lab Results   Component Value Date    VITD25 15.1 (L) 04/24/2023        Treatments: therapies:     Mental Status Examination:  Alert, Oriented X 4  Appearance:  Proper Grooming and Hygiene  Speech with Regular Rate and Rhythm  Eye Contact:  Good  No Psychomotor Agitation/Retardation Noted  Attitude:  Cooperative  Mood:  \"Good\"  Affective: Congruent, appropriate to the situation, with a normal range and intensity  Thought Processes:  Coherently communicated, logical and goal oriented  Thought Content:  No Suicidal Ideation, No Homicidal Ideation, No Auditory or Visual Hallucinations, No Overt Delusions  Insight: Improved  Judgement: Improved  Memory is intact for both remote and recent  Intellectual Functioning:  Within the [address] of Knowledge:  Adequate  Attention and Concentration:  Adequate      Discharge Exam:  Please, see medical note    Disposition: home    Patient Instructions:   Current Discharge Medication List        START taking these medications    Details   hydrOXYzine HCl (ATARAX) 25 MG tablet Take 1 tablet by mouth 3 times daily as needed for Anxiety  Qty: 42 tablet, Refills: 0      traZODone (DESYREL) 100 MG tablet Take 1 tablet by mouth nightly  Qty: 14 tablet, Refills: 0      melatonin 5 MG TBDP disintegrating tablet Take 1 tablet by mouth nightly  Qty: 14 tablet, Refills: 0      !! risperiDONE (RISPERDAL) 1 MG tablet Take 1 tablet by mouth nightly  Qty: 14

## 2023-04-27 NOTE — PROGRESS NOTES
Behavioral Health   Discharge Note    Bridge Appointment completed: Reviewed Discharge Instructions with patient. Patient verbalizes understanding and agreement with the discharge plan using the teachback method. Referral for Outpatient Tobacco Cessation Counseling, upon discharge (clifton X if applicable and completed):    ( )  Hospital staff assisted patient to call Quit Line or faxed referral  during hospitalization                  ( )  Recognizing danger situations (included triggers and roadblocks), if not completed on admission                    ( )  Coping skills (new ways to manage stress, exercise, relaxation techniques, changing routine, distraction), if not completed on admission                                                           ( )  Basic information about quitting (benefits of quitting, techniques in how to quit, available resources, if not completed on admission  ( ) Referral for counseling faxed to [facility]   ( ) Patient refused referral  ( ) Patient refused counseling  ( ) Patient refused smoking cessation medication upon discharge  (X) [facility]    Vaccinations (clifton X if applicable and completed):  ( ) Patient states already received influenza vaccine elsewhere  ( ) Patient received influenza vaccine during this hospitalization  (X) Patient refused influenza vaccine at this time      Pt discharged with followings belongings:   Dental Appliances: None  Vision - Corrective Lenses: None  Hearing Aid: None  Jewelry: Other (Comment) (See belongings document)  Body Piercings Removed: N/A  Clothing: Other (Comment) (See belongings document)  Other Valuables: Other (Comment) (See belongings document)     BELONGINGS AND MEDICATIONS sent home with PT. NO valuables located in security or safe. Patient left department with TESSA Jeffery via [employer] [address] [employer], discharged to HOME/SELF-CARE.  Patient education on aftercare instructions: YES  Patient
CLINICAL PHARMACY NOTE: MEDS TO BEDS    Total # of Prescriptions Filled: 6   The following medications were delivered to the patient:  Discharge Medication List as of 4/26/2023 11:43 AM        START taking these medications    Details   hydrOXYzine HCl (ATARAX) 25 MG tablet Take 1 tablet by mouth 3 times daily as needed for Anxiety, Disp-42 tablet, R-0Normal      traZODone (DESYREL) 100 MG tablet Take 1 tablet by mouth nightly, Disp-14 tablet, R-0Normal      melatonin 5 MG TBDP disintegrating tablet Take 1 tablet by mouth nightly, Disp-14 tablet, R-0Normal      !! risperiDONE (RISPERDAL) 1 MG tablet Take 1 tablet by mouth nightly, Disp-14 tablet, R-0Normal      !! risperiDONE (RISPERDAL) 0.5 MG tablet Take 1 tablet by mouth daily, Disp-14 tablet, R-0Normal      nicotine (NICODERM CQ) 14 MG/24HR Place 1 patch onto the skin daily, Disp-14 patch, R-0Normal       !! - Potential duplicate medications found. Please discuss with provider. Additional Documentation:     Raffaele SAGASTUME picked up at pharmacy.
[facility] Adult Unit Daily Assessment  Nursing Progress Note     Room: [facility]   Name: Kala Rasmussen   Age: 28 y.o. Gender: female   Dx: Suicidal ideation  Precautions: suicide risk and seizure precautions  Inpatient Status: voluntary         SLEEP:  Sleep Quality reports she slept well  Sleep Medications: Yes melatonin 5mg trazodone 100 mg  PRN Sleep Meds: No         MEDICAL:  Other PRN Meds: Yes atarax 25 mg  Med Compliant: Yes  Accu-Chek: No  Oxygen/CPAP/BiPAP: No  CIWA/CINA: No   PAIN Assessment: none  Side Effects from medication: No                 Medical Bed:   Is patient in a medical bed? no   If medical bed is in use, has nursing secured room while patient is awake and out of the room? NA  Has safety checks by nursing been completed on the bed/room this shift? yes     Protective Factors:  Patient identifies protective factors with nursing staff as follows: Identifies reasons for living: Yes              Supportive Social Network or family: Yes               Belief that suicide is immoral/high spirituality: Yes              Responsibility to family or others/living with family: Yes              Fear of death or dying due to pain and suffering: Yes              Engaged in work or school: Yes                If Patient is unable to identify, reason why? PSYCH:  Depression: 1  Anxiety: 1  SI denies suicidal ideation   Risk of Suicide: No Risk  HI Negative for homicidal ideation      AVH:no If Hallucinations are present, describe? GENERAL:  Appetite: good   Percent Meals: %   Social: Yes minimally  Speech: normal   Appearance: appropriately dressed and healthy looking     GROUP:  Group Participation: Yes  Participation Quality: Active Listener    Notes: Patient observed in day area wearing casual attire. She is appropriately dressed and well groomed. Alert and oriented x 4. Pleasant, calm, and cooperative. Slightly withdrawn with flat affect.  Attentive with fair eye contact
Department of Psychiatry  Attending Progress Note      SUBJECTIVE:    28years old female with previous psychiatric history of depression, anxiety, opioid use disorder, questionable ADHD and questionable schizoaffective disorder, who has been admitted to our psychiatric unit secondary to reported by patient auditory hallucinations. Patient has been seen in treatment team room with presence of the patient's nurse. Patient reported that her condition significantly improved during this hospital stay, stated that her mood is \"much better\" today. Patient endorses good appetite and good quality of sleep, stated that she does not experience any difficulties to fall asleep or stay asleep. She is compliant with currently prescribed medications and denies any side effects. Patient continues to endorse mild feeling of anxiety and mild depression, and rated both of them as 2 out of 10, with 10 being the worst.  She attends group activities in the unit and participates in some of those activities. Patient is social with medical staff and other patients in the unit. She performs her ADLs daily and took a shower today. Patient denies current active suicidal or homicidal ideations, denies any plans. She denies auditory and visual hallucinations, stated that she did not experience any hallucinations yesterday during the day or today. Patient did not endorse any delusions or paranoid thoughts. OBJECTIVE    Physical  VITALS:  /78   Pulse 93   Temp 97 °F (36.1 °C) (Temporal)   Resp 16   Ht 5' 8\" (1.727 m)   Wt 191 lb 9.6 oz (86.9 kg)   LMP 2023 (Approximate)   SpO2 96%   BMI 29.13 kg/m²   TEMPERATURE:  Current - Temp: 97 °F (36.1 °C);  Max - Temp  Av.9 °F (36.1 °C)  Min: 96.8 °F (36 °C)  Max: 97 °F (36.1 °C)  RESPIRATIONS RANGE: Resp  Av  Min: 16  Max: 16  PULSE RANGE: Pulse  Av  Min: 93  Max: 109  BLOOD PRESSURE RANGE:  Systolic (36VVO), XLY:780 , Min:124 , BVK:928  ; Diastolic
Discharge Note     Patient is discharging on this date. Patient denies SI, HI, and AVH at this time. Patient reports improvement in behavior and is leaving unit in overall good condition. SW and patient discussed patient's follow up appointments and importance of attending appointments as scheduled, patient voiced understanding and agreement. Patient and SW also discussed patient's safety plan and patient was able to verbally identify: warning signs, coping strategies, places and people that help make the patient feel better/distract negative thoughts, friends/family/agencies/professionals the patient can reach out to in a crisis, and something that is important to the patient/worth living for. Patient was provided the national suicide prevention hotline number (3-671-685-307-980-2196) as well as local community behavioral health [facility]) crisis number for emergencies (2-046-435-657-541-7166). Discharge Disposition: home -lives with family      Pt to follow up with:  Dr Ruby Rivera  on April 27 , 2023 at 3:30 PM for the intake appointment.    Referral to outpatient tobacco cessation counseling treatment:  Patient refused referral to outpatient tobacco cessation counseling    SW offered to assist patient with transportation, patient declined transportation assistance
Group Note    Date: 04/23/23  Start Time: 7:30 PM   End Time:8:00 PM     Number of Participants: 7    Type of Group: Wrap-Up     Patient's Goal:  none listed    Notes:  none listed    Status After Intervention:  Unchanged    Participation Level: Minimal    Participation Quality: Appropriate    Speech:  normal    Thought Process/Content: Logical    Mood: anxious and depressed    Level of consciousness:  Alert and Oriented x4    Response to Learning: Progressing to goal    Modes of Intervention: Education and Support    Discipline Responsible: Registered Nurse     Signature:  Jenniffer Kirk RN
Group Note    Date: 04/24/23  Start Time: 7:00 PM   End Time:7:30 PM     Number of Participants: 9    Type of Group: Wrap-Up     Patient's Goal:      Notes:      Status After Intervention:  Unchanged    Participation Level:  Active Listener    Participation Quality: Attentive    Speech:  normal    Thought Process/Content: Logical    Mood:  appropriate for group    Level of consciousness:  Alert    Response to Learning: Progressing to goal    Modes of Intervention: Education and Support    Discipline Responsible: Behavioral Health Technician     Signature:  Isabela Bush
Group Note    Date: 04/25/23  Start Time: 8:00 AM   End Time:8:30 AM     Number of Participants: 11    Type of Group: Community/Goal     Patient's Goal:  \"Thinking of plan to leave with\"     Notes:      Status After Intervention:      Participation Level:  Active Listener    Participation Quality: Appropriate    Speech:  normal    Thought Process/Content: Logical    Mood:  Calm    Level of consciousness:  Alert    Response to Learning: Able to verbalize current knowledge/experience    Modes of Intervention: Education and Support    Discipline Responsible: Behavioral Health Technician     Signature:  Tegan Worley
Group Note    Date: 04/26/23  Start Time: 7:45 AM   End Time:8:15 AM     Number of Participants: 13    Type of Group: Community/Goal     Patient's Goal:  Getting things ready to go home    Notes:      Status After Intervention:  Unchanged    Participation Level: Minimal    Participation Quality: Appropriate    Speech:  normal    Thought Process/Content: Logical    Mood: depressed    Level of consciousness:  Alert    Response to Learning: Progressing to goal    Modes of Intervention: Education and Support    Discipline Responsible: Registered Nurse     Signature:  Jane Hernadez RN
Group Therapy Note    Date: 4/24/2023  Start Time: 0800  End Time:  0830  Number of Participants: 8    Type of Group: [employer] Information  Module Name:  Self-Inventory  Session Number:  1    Patient's Goal:  Figure out what to do after discharge   Status After Intervention:  Unchanged    Participation Level: Active Listener    Participation Quality: Appropriate      Speech:  normal      Thought Process/Content: Linear      Affective Functioning: Congruent      Mood: calm      Level of consciousness:  Alert and Oriented x4      Response to Learning: Able to verbalize current knowledge/experience      Endings: None Reported    Modes of Intervention: Education and Support      Discipline Responsible: Registered Nurse      Signature:   Luz Martinez RN
[facility] Adult Unit Daily Assessment  Nursing Progress Note    Room: [facility]601-01   Name: Roshni Glass   Age: 28 y.o. Gender: female   Dx: Suicidal ideation  Precautions: suicide risk, fall risk, and seizure precautions  Inpatient Status: voluntary       SLEEP:  Sleep Quality Good  Sleep Medications: Yes   PRN Sleep Meds: No       MEDICAL:  Other PRN Meds: Yes   Med Compliant: Yes  Accu-Chek: No  Oxygen/CPAP/BiPAP: No  CIWA/CINA: No   PAIN Assessment: none  Side Effects from medication: No      Metabolic Screening:  Lab Results   Component Value Date    LABA1C 5.0 04/24/2023     Lab Results   Component Value Date    CHOL 145 (L) 04/24/2023    CHOL 177 11/10/2020     Lab Results   Component Value Date    TRIG 102 04/24/2023    TRIG 196 (H) 11/10/2020     Lab Results   Component Value Date    HDL 32 (L) 04/24/2023    HDL 26 (L) 11/10/2020     No components found for: LDLCAL  No results found for: LABVLDL  Body mass index is 29.13 kg/m². BP Readings from Last 2 Encounters:   04/24/23 132/69   07/29/21 (!) 142/85         Medical Bed:   Is patient in a medical bed? no   If medical bed is in use, has nursing secured room while patient is awake and out of the room? NA  Has safety checks by nursing been completed on the bed/room this shift? NA    Protective Factors:  Patient identifies protective factors with nursing staff as follows: Identifies reasons for living: Yes   Supportive Social Network or family: Yes    Belief that suicide is immoral/high spirituality: Yes   Responsibility to family or others/living with family: Yes   Fear of death or dying due to pain and suffering: Yes   Engaged in work or school: Yes     If Patient is unable to identify, reason why? PSYCH:  Depression: 2-3   Anxiety: 2-3   SI denies suicidal ideation   Risk of Suicide: No Risk  HI Negative for homicidal ideation      AVH:no If Hallucinations are present, describe?  N/a        GENERAL:  Appetite: no change from normal   Percent Meals:
[facility] Adult Unit Daily Assessment  Nursing Progress Note    Room: [facility]601-01   Name: Zoila Krueger   Age: 28 y.o. Gender: female   Dx: Suicidal ideation  Precautions: suicide risk, fall risk, and seizure precautions  Inpatient Status: voluntary       SLEEP:  Sleep Quality Good  Sleep Medications: Yes   PRN Sleep Meds: No       MEDICAL:  Other PRN Meds: No   Med Compliant: Yes  Accu-Chek: No  Oxygen/CPAP/BiPAP: No  CIWA/CINA: No   PAIN Assessment: none  Side Effects from medication: No      Metabolic Screening:  Lab Results   Component Value Date    LABA1C 5.0 04/24/2023     Lab Results   Component Value Date    CHOL 145 (L) 04/24/2023    CHOL 177 11/10/2020     Lab Results   Component Value Date    TRIG 102 04/24/2023    TRIG 196 (H) 11/10/2020     Lab Results   Component Value Date    HDL 32 (L) 04/24/2023    HDL 26 (L) 11/10/2020     No components found for: LDLCAL  No results found for: LABVLDL  Body mass index is 29.13 kg/m². BP Readings from Last 2 Encounters:   04/25/23 122/85   07/29/21 (!) 142/85         Medical Bed:   Is patient in a medical bed? no   If medical bed is in use, has nursing secured room while patient is awake and out of the room? no  Has safety checks by nursing been completed on the bed/room this shift? no    Protective Factors:  Patient identifies protective factors with nursing staff as follows: Identifies reasons for living: Yes   Supportive Social Network or family: Yes    Belief that suicide is immoral/high spirituality: Yes   Responsibility to family or others/living with family: Yes   Fear of death or dying due to pain and suffering: Yes   Engaged in work or school: Yes     If Patient is unable to identify, reason why? PSYCH:  Depression: 1   Anxiety: 1   SI denies suicidal ideation   Risk of Suicide: No Risk  HI Negative for homicidal ideation      AVH:no If Hallucinations are present, describe?          GENERAL:  Appetite: good   Percent Meals:    Social: Yes   Speech:
Progress Note  Ravi Martin  4/26/2023 8:23 AM  Subjective:   Admit Date:   4/22/2023      CC/ADMIT DX:       Interval History:   Reviewed overnight events and nursing notes. She denies any physical complaints. I have reviewed all labs/diagnostics from the last 24hrs. ROS:   I have done a 10 point ROS and all are negative, except what is mentioned in the HPI. ADULT DIET; Regular    Medications:      risperiDONE  0.5 mg Oral Daily    risperiDONE  1 mg Oral Nightly    vitamin D  50,000 Units Oral Weekly    traZODone  100 mg Oral Nightly    melatonin  5 mg Oral Nightly    nicotine  1 patch TransDERmal Daily           Objective:   Vitals: /68   Pulse 90   Temp 97.3 °F (36.3 °C) (Temporal)   Resp 18   Ht 5' 8\" (1.727 m)   Wt 191 lb 9.6 oz (86.9 kg)   LMP 04/11/2023 (Approximate)   SpO2 96%   BMI 29.13 kg/m²  No intake or output data in the 24 hours ending 04/26/23 0823  General appearance: alert and cooperative with exam  Extremities: extremities normal, atraumatic, no cyanosis or edema  Neurologic:  No obvious focal neurologic deficits. Skin: no rashes    Assessment and Plan:   Principal Problem:    Suicidal ideation  Active Problems:    Depression with suicidal ideation    Psychosis, unspecified psychosis type ([address]  Resolved Problems:    * No resolved hospital problems. *    Vit D Def    Plan:   Continue present medication(s)    She is medically stable. I will monitor for any changes or concerns. Follow with Psych      Discharge planning:   her home     Reviewed treatment plans with the patient and/or family.              Electronically signed by Vernon Vasquez MD on 4/26/2023 at 8:23 AM
Progress Note  Sy Cano  4/24/2023 5:26 PM  Subjective:   Admit Date:   4/22/2023      CC/ADMIT DX:       Interval History:   Reviewed overnight events and nursing notes. She has no new medical issues. I have reviewed all labs/diagnostics from the last 24hrs. ROS:   I have done a 10 point ROS and all are negative, except what is mentioned in the HPI. ADULT DIET; Regular    Medications:      [START ON 4/25/2023] risperiDONE  0.5 mg Oral Daily    risperiDONE  1 mg Oral Nightly    vitamin D  50,000 Units Oral Weekly    traZODone  100 mg Oral Nightly    melatonin  5 mg Oral Nightly    nicotine  1 patch TransDERmal Daily           Objective:   Vitals: /75   Pulse (!) 105   Temp 96.8 °F (36 °C)   Resp 16   Ht 5' 8\" (1.727 m)   Wt 191 lb 9.6 oz (86.9 kg)   LMP 04/11/2023 (Approximate)   SpO2 98%   BMI 29.13 kg/m²  No intake or output data in the 24 hours ending 04/24/23 1726  General appearance: alert and cooperative with exam  Extremities: extremities normal, atraumatic, no cyanosis or edema  Neurologic:  No obvious focal neurologic deficits. Skin: no rashes    Assessment and Plan:   Principal Problem:    Suicidal ideation  Active Problems:    Depression with suicidal ideation    Psychosis, unspecified psychosis type ([address]  Resolved Problems:    * No resolved hospital problems. *    Vit D Def    Plan:   Continue present medication(s)    Replace Vit D   Follow with Psych      Discharge planning:   her home     Reviewed treatment plans with the patient and/or family.              Electronically signed by Navarro Urena MD on 4/24/2023 at 5:26 PM
Progress Note  Sy Cano  4/26/2023 11:40 PM  Subjective:   Admit Date:   4/22/2023      CC/ADMIT DX:       Interval History:   Reviewed overnight events and nursing notes. She has no new medical issues. I have reviewed all labs/diagnostics from the last 24hrs. ROS:   I have done a 10 point ROS and all are negative, except what is mentioned in the HPI. No diet orders on file    Medications:               Objective:   Vitals: /68   Pulse 90   Temp 97.3 °F (36.3 °C) (Temporal)   Resp 18   Ht 5' 8\" (1.727 m)   Wt 191 lb 9.6 oz (86.9 kg)   LMP 04/11/2023 (Approximate)   SpO2 96%   BMI 29.13 kg/m²  No intake or output data in the 24 hours ending 04/26/23 2340  General appearance: alert and cooperative with exam  Extremities: extremities normal, atraumatic, no cyanosis or edema  Neurologic:  No obvious focal neurologic deficits. Skin: no rashes    Assessment and Plan:   Principal Problem:    Suicidal ideation  Active Problems:    Depression with suicidal ideation    Psychosis, unspecified psychosis type ([address]  Resolved Problems:    * No resolved hospital problems. *    Vit D Def    Plan:   Continue present medication(s)    She remains medically stable. I will monitor for any changes or concerns. Follow with Psych      Discharge planning:   her home     Reviewed treatment plans with the patient and/or family.              Electronically signed by Navarro Urena MD on 4/26/2023 at 11:40 PM
[facility] Adult Unit Daily Assessment  Nursing Progress Note    Room: [facility]601-01   Name: Wilma Oneill   Age: 28 y.o. Gender: female   Dx: Suicidal ideation  Precautions: suicide risk and seizure precautions  Inpatient Status: voluntary       SLEEP:  Sleep Quality Poor does not feel rested  Sleep Medications: Yes melatonin 5mg trazodone 100 mg  PRN Sleep Meds: No       MEDICAL:  Other PRN Meds: Yes atarax 25 mg  Med Compliant: Yes  Accu-Chek: No  Oxygen/CPAP/BiPAP: No  CIWA/CINA: No   PAIN Assessment: none  Side Effects from medication: No      Metabolic Screening:  Lab Results   Component Value Date    LABA1C 4.9 11/10/2020     Lab Results   Component Value Date    CHOL 177 11/10/2020     Lab Results   Component Value Date    TRIG 196 (H) 11/10/2020     Lab Results   Component Value Date    HDL 26 (L) 11/10/2020     No components found for: LDLCAL  No results found for: LABVLDL  Body mass index is 29.13 kg/m². BP Readings from Last 2 Encounters:   04/23/23 118/86   07/29/21 (!) 142/85         Medical Bed:   Is patient in a medical bed? no   If medical bed is in use, has nursing secured room while patient is awake and out of the room? NA  Has safety checks by nursing been completed on the bed/room this shift? yes    Protective Factors:  Patient identifies protective factors with nursing staff as follows: Identifies reasons for living: Yes   Supportive Social Network or family: Yes    Belief that suicide is immoral/high spirituality: Yes   Responsibility to family or others/living with family: Yes   Fear of death or dying due to pain and suffering: Yes   Engaged in work or school: Yes     If Patient is unable to identify, reason why? PSYCH:  Depression: 4   Anxiety: 4   SI denies suicidal ideation   Risk of Suicide: No Risk  HI Negative for homicidal ideation      AVH:no If Hallucinations are present, describe?          GENERAL:  Appetite: good   Percent Meals: 75%   Social: Yes minimally  Speech: normal
Treatment Team Note:     Target Symptoms/Reason for admission: Per nursing admission assessment - Reason for Admission: Shanelle Boston is a 28 y.o. female who presents to the emergency department for mental health evaluation. Patient reports SI no plan for past several days. Admits to auditory hallucinations telling her to hurt herself. No HI or visual hallucinations. Seeing Dr. Hermelinda Carolina in [address] [place] [address] for outpatient psychiatric treatment. Has history of multiple admissions to [facility] for inpatient psych and history of suicide attempt. Diagnoses per psych provider: Depression with suicidal ideation [F32. A, R45.851]  Suicidal ideation [R45.851]  Psychosis, unspecified psychosis type ([facility] [address] [facility] [F29]     Therapist met with treatment team to discuss patients treatment and discharge plans.      Patient's aftercare plan is: SW will meet with patient to gather information     Aftercare appointments made: No - SW will make discharge appointments     Pt lives with: parents     Collateral obtained from:  Pt's mom, Maria Victoria Aquino  Collateral obtained on:04/23/23     Attending groups: Yes     Behavior: calm and cooperative, friendly, social with staff/peers     Has patient been completing ADL's:  Yes     SI:  patient denies SI  Plan: no   If yes describe: N/A - patient denies plan  HI:  patient denies HI  If present describe: N/A  Delusions: patient denies delusions  If present describe: N/A  Hallucinations: patient denies hallucinations  If present describe: N/A     Patient rates their -- Depression: 1-10:  4  Anxiety:1-10:  4     Sleeping: Poor     Taking medication: Yes     Misc:
Treatment Team Note:    Target Symptoms/Reason for admission: Per nursing admission assessment - Reason for Admission: Hannah Chavez is a 28 y.o. female who presents to the emergency department for mental health evaluation. Patient reports SI no plan for past several days. Admits to auditory hallucinations telling her to hurt herself. No HI or visual hallucinations. Seeing Dr. Ijeoma Gale in [address] [place], [place] [address] for outpatient psychiatric treatment. Has history of multiple admissions to [facility] for inpatient psych and history of suicide attempt. Diagnoses per psych provider: Depression with suicidal ideation [F32. A, R45.851]  Suicidal ideation [R45.851]  Psychosis, unspecified psychosis type ([facility] [address] [F29]    Therapist met with treatment team to discuss patients treatment and discharge plans. Patient's aftercare plan is:  [address]    Aftercare appointments made: Yes    Pt lives with: parents    Collateral obtained from:  Pt's momJacqueline  Collateral obtained on:04/23/23    Attending groups: Yes    Behavior: cooperative, social with peers/staff, affect is flat, anxious    Has patient been completing ADL's:  Yes    SI:  patient denies SI  Plan: no   If yes describe: N/A - patient denies plan  HI:  patient denies HI  If present describe: N/A  Delusions: patient denies delusions  If present describe: N/A  Hallucinations: patient denies hallucinations  If present describe: N/A    Patient rates their -- Depression: 1-10:  1  Anxiety:1-10:  1    Sleeping:Yes    Taking medication: Yes    Misc: Pt will be discharged today.
Treatment Team Note:    Target Symptoms/Reason for admission: Per nursing admission assessment - Reason for Admission: Hollie Flores is a 28 y.o. female who presents to the emergency department for mental health evaluation. Patient reports SI no plan for past several days. Admits to auditory hallucinations telling her to hurt herself. No HI or visual hallucinations. Seeing Dr. Jennifer De La Rosa in [address] [place] [address] for outpatient psychiatric treatment. Has history of multiple admissions to [place] [facility] for inpatient psych and history of suicide attempt. Diagnoses per psych provider: Depression with suicidal ideation [F32. A, R45.851]  Suicidal ideation [R45.851]  Psychosis, unspecified psychosis type ([facility] [address] [F29]    Therapist met with treatment team to discuss patients treatment and discharge plans.     Patient's aftercare plan is: SW will meet with patient to gather information    Aftercare appointments made: No - SW will make discharge appointments    Pt lives with: parents    Collateral obtained from:  Pt's momManjit Grand  Collateral obtained on:04/23/23    Attending groups: Yes    Behavior: calm and cooperative, friendly, social with staff/peers    Has patient been completing ADL's:  Yes    SI:  patient denies SI  Plan: no   If yes describe: N/A - patient denies plan  HI:  patient denies HI  If present describe: N/A  Delusions: patient denies delusions  If present describe: N/A  Hallucinations: patient denies hallucinations  If present describe: N/A    Patient rates their -- Depression: 1-10:  4  Anxiety:1-10:  4    Sleeping: Poor    Taking medication: Yes    Misc:
[facility] Adult Unit Daily Assessment  Nursing Progress Note     Room: [facility]/601-01   Name: Nimo Kiran   Age: 28 y.o. Gender: female   Dx: Suicidal ideation  Precautions: suicide risk, fall risk, and seizure precautions  Inpatient Status: voluntary         SLEEP:  Sleep Quality Good  Sleep Medications: Yes   PRN Sleep Meds: No         MEDICAL:  Other PRN Meds: Yes   Med Compliant: Yes  Accu-Chek: No  Oxygen/CPAP/BiPAP: No  CIWA/CINA: No   PAIN Assessment: none  Side Effects from medication: No           Medical Bed:   Is patient in a medical bed? no   If medical bed is in use, has nursing secured room while patient is awake and out of the room? NA  Has safety checks by nursing been completed on the bed/room this shift? NA     Protective Factors:  Patient identifies protective factors with nursing staff as follows: Identifies reasons for living: Yes              Supportive Social Network or family: Yes               Belief that suicide is immoral/high spirituality: Yes              Responsibility to family or others/living with family: Yes              Fear of death or dying due to pain and suffering: Yes              Engaged in work or school: Yes                If Patient is unable to identify, reason why? PSYCH:  Depression: 1   Anxiety: 1   SI denies suicidal ideation   Risk of Suicide: No Risk  HI Negative for homicidal ideation      AVH:no If Hallucinations are present, describe? N/a           GENERAL:  Appetite: no change from normal   Percent Meals:  100%  Social: Yes   Speech: normal   Appearance: appropriately dressed     GROUP:  Group Participation: Yes  Participation Quality: Active Listener     Notes:Patient observed in day area wearing casual attire. She is appropriately dressed and well groomed. Alert and oriented x 4. Pleasant, calm, and cooperative. Reports mood has improved since admission and says \"I feel a lot better today. \"Attentive with good eye contact during interview.
patch, 1 patch, TransDERmal, Daily  nicotine polacrilex (NICORETTE) gum 2 mg, 2 mg, Oral, Q2H PRN    ASSESSMENT AND PLAN    DSM 5 DIAGNOSIS:  Psychosis, unspecified  Opioid use disorder, severe  Tobacco use disorder  History of ADHD, per patient  Insomnia  Vitamin D deficiency    Plan:   1. Psychiatric Medications:   Continue current psychotropic medications as recommended. Patient denies side effect of currently prescribed medications. Will increase dose of Risperdal from total daily dose of 1 mg to a total daily dose of 1.5 mg - 0.5 mg in the morning and 1 mg at night for psychosis. The risks, benefits, side effects, indications, contraindications, alternatives and adverse effects of the medications have been discussed with patient. 2. Medical Issues:    Continue medical monitoring by Dr. Damaris Anderson and associates. Will start patient on vitamin D 50,000 units weekly for vitamin D deficiency    3. Disposition:    Discharge patient home when she will be in stable mental condition and adjustment psychotropic medications will be done.      Amount of time spent with patient:    35 minutes with greater than 50% of the time spent in counseling and collaboration of care

## 2023-04-28 ENCOUNTER — HOSPITAL ENCOUNTER (EMERGENCY)
Facility: HOSPITAL | Age: 33
Discharge: HOME | End: 2023-04-28
Payer: COMMERCIAL

## 2023-04-28 ENCOUNTER — APPOINTMENT (OUTPATIENT)
Dept: GENERAL RADIOLOGY | Facility: HOSPITAL | Age: 33
End: 2023-04-28
Payer: COMMERCIAL

## 2023-04-28 VITALS
RESPIRATION RATE: 16 BRPM | SYSTOLIC BLOOD PRESSURE: 117 MMHG | TEMPERATURE: 98.9 F | OXYGEN SATURATION: 100 % | WEIGHT: 204 LBS | DIASTOLIC BLOOD PRESSURE: 83 MMHG | BODY MASS INDEX: 30.92 KG/M2 | HEIGHT: 68 IN | HEART RATE: 90 BPM

## 2023-04-28 DIAGNOSIS — S83.91XA SPRAIN OF RIGHT KNEE, UNSPECIFIED LIGAMENT, INITIAL ENCOUNTER: Primary | ICD-10-CM

## 2023-04-28 PROCEDURE — 73562 X-RAY EXAM OF KNEE 3: CPT

## 2023-04-28 PROCEDURE — 99283 EMERGENCY DEPT VISIT LOW MDM: CPT

## 2023-04-28 RX ORDER — RISPERIDONE 0.5 MG/1
TABLET ORAL
COMMUNITY
Start: 2023-04-27

## 2023-04-28 RX ORDER — IBUPROFEN 800 MG/1
TABLET ORAL
COMMUNITY
Start: 2023-01-03

## 2023-04-28 RX ORDER — RISPERIDONE 1 MG/1
TABLET ORAL
COMMUNITY
Start: 2023-04-27

## 2023-04-28 RX ORDER — ERGOCALCIFEROL 1.25 MG/1
CAPSULE ORAL
COMMUNITY
Start: 2023-04-10

## 2023-04-28 RX ORDER — FAMOTIDINE 20 MG/1
TABLET, FILM COATED ORAL
COMMUNITY
Start: 2023-04-20

## 2023-04-28 RX ORDER — ACETAMINOPHEN 500 MG
1000 TABLET ORAL ONCE
Status: COMPLETED | OUTPATIENT
Start: 2023-04-28 | End: 2023-04-28

## 2023-04-28 RX ORDER — NORETHINDRONE ACETATE AND ETHINYL ESTRADIOL, ETHINYL ESTRADIOL AND FERROUS FUMARATE 1MG-10(24)
KIT ORAL
COMMUNITY
Start: 2023-02-14

## 2023-04-28 RX ORDER — LEVOTHYROXINE SODIUM 0.05 MG/1
50 TABLET ORAL DAILY
COMMUNITY
Start: 2022-11-11

## 2023-04-28 RX ADMIN — ACETAMINOPHEN 1000 MG: 500 TABLET, FILM COATED ORAL at 12:26

## 2023-04-28 NOTE — ED PROVIDER NOTES
Subjective   History of Present Illness  Patient is a 33 year old female who presents to the ED today complaining of right knee pain following an injury that occurred at home. Patient states she accidentally twisted her knee inward and then fell and landed directly on it. She is reporting pain just below the patella, the patella itself is stable.  Patient is ambulatory in the ED. Cap refill, sensation, pedal strength, pulse, ROM, and skin intact; no discoloration noted although there is a mild amount of swelling. There is a fair amount of pain with active ROM. PMH is significant for ADHD and schizophrenia. Differential diagnoses: patellar fracture, knee sprain, and other.     History provided by:  Patient   used: No        Review of Systems   Constitutional: Negative for chills, diaphoresis, fatigue and fever.   HENT: Negative.    Eyes: Negative.    Respiratory: Negative.    Cardiovascular: Negative.    Gastrointestinal: Negative.    Genitourinary: Negative.    Musculoskeletal: Positive for arthralgias and joint swelling. Negative for back pain and neck pain.   Skin: Negative for color change and wound.   Neurological: Negative.    Psychiatric/Behavioral: Negative.        Past Medical History:   Diagnosis Date   • ADHD    • Dx about 4-5 years ago    • Schizo affective schizophrenia    • Stadol use disorder, mild, in sustained remission, abuse     4 years and 3 years clean of meth use and 3 years and 2 months clean from opiods       Allergies   Allergen Reactions   • Bee Venom Anaphylaxis     Pt states that she carries an Epipen    • Venomil Wasp Venom [Wasp Venom] Anaphylaxis     Carries an Epipen       Past Surgical History:   Procedure Laterality Date   •  SECTION N/A 2018    Procedure:  SECTION PRIMARY;  Surgeon: Elias Singh MD;  Location: [facility] LABOR DELIVERY;  Service: Obstetrics/Gynecology   • OVARIAN CYST REMOVAL Right    • WRIST SURGERY Left     to  remove cyst        Family History   Problem Relation Age of Onset   • Hypertension Father    • Hypertension Mother    • Hypertension Paternal Grandfather    • Heart attack Paternal Grandfather    • Hypertension Paternal Grandmother    • Breast cancer Maternal Grandmother    • Hypertension Maternal Grandmother    • Heart attack Maternal Grandmother    • Hypertension Maternal Grandfather    • Ovarian cancer Neg Hx    • Uterine cancer Neg Hx    • Colon cancer Neg Hx        Social History     Socioeconomic History   • Marital status: Single   Tobacco Use   • Smoking status: Every Day     Packs/day: 1.00     Years: 0.00     Pack years: 0.00     Types: Cigarettes     Start date: 9/2/2004   • Smokeless tobacco: Never   • Tobacco comments:     asking MD for patches    Substance and Sexual Activity   • Alcohol use: No   • Drug use: Yes     Types: Methamphetamines, Hydrocodone, Oxycodone     Comment: pt is now only using methadone, 100 mg once a day - supplied from the [facility] recovery  center    • Sexual activity: Yes     Partners: Male     Birth control/protection: None     Objective   Physical Exam  Vitals and nursing note reviewed.   Constitutional:       General: She is not in acute distress.     Appearance: Normal appearance. She is not ill-appearing, toxic-appearing or diaphoretic.   HENT:      Head: Normocephalic and atraumatic.      Right Ear: External ear normal.      Left Ear: External ear normal.      Nose: Nose normal.   Eyes:      Extraocular Movements: Extraocular movements intact.      Conjunctiva/sclera: Conjunctivae normal.      Pupils: Pupils are equal, round, and reactive to light.   Cardiovascular:      Rate and Rhythm: Normal rate and regular rhythm.      Pulses: Normal pulses.           Dorsalis pedis pulses are 2+ on the right side.        Posterior tibial pulses are 2+ on the right side.      Heart sounds: Normal heart sounds.   Pulmonary:      Effort: Pulmonary effort is normal.      Breath sounds:  Normal breath sounds.   Musculoskeletal:      Cervical back: Normal range of motion.      Right knee: Swelling present. No erythema. Normal range of motion. Tenderness present over the patellar tendon.      Right lower leg: No edema.      Left lower leg: No edema.   Skin:     General: Skin is warm and dry.      Capillary Refill: Capillary refill takes less than 2 seconds.   Neurological:      Mental Status: She is alert and oriented to person, place, and time. Mental status is at baseline.      GCS: GCS eye subscore is 4. GCS verbal subscore is 5. GCS motor subscore is 6.      Sensory: Sensation is intact. No sensory deficit.   Psychiatric:         Mood and Affect: Mood normal.         Behavior: Behavior normal.         Thought Content: Thought content normal.         Judgment: Judgment normal.       XR Knee 3 View Right   Final Result   1. Unremarkable radiographs of the right knee.           This report was finalized on 04/28/2023 11:50 by Dr Monty Astorga, .        Procedures           ED Course                                           Medical Decision Making  Patient is a 33 year old female who presents to the ED today complaining of right knee pain following an injury that occurred at home. Patient states she accidentally twisted her knee inward and then fell and landed directly on it. She is reporting pain just below the patella, the patella itself is stable.  Patient is ambulatory in the ED. Cap refill, sensation, pedal strength, pulse, ROM, and skin intact; no discoloration noted although there is a mild amount of swelling. There is a fair amount of pain with active ROM. PMH is significant for ADHD and schizophrenia. Differential diagnoses: patellar fracture, knee sprain, and other.     X-ray reveals no acute findings.  Patient was given p.o. Tylenol for pain as she cannot have NSAIDs due to altered kidney function previously.  Patient reports NSAIDs upset her stomach as well.  Patient was given a knee  sleeve for compression and comfort; I also recommended rest, ice, and elevation.  She can treat at home with Tylenol.  We discussed that she should follow-up with her PCP in the coming days, return precautions given, I also provided her with the phone number for an orthopedic doctor for follow-up as needed as well.  She is agreeable and appreciative with plan of care.  Vital signs reassuring, patient discharged in stable condition.  She is ambulatory on discharge.    Sprain of right knee, unspecified ligament, initial encounter: acute illness or injury  Amount and/or Complexity of Data Reviewed  Radiology: ordered.      Risk  OTC drugs.          Final diagnoses:   Sprain of right knee, unspecified ligament, initial encounter       ED Disposition  ED Disposition     ED Disposition   Discharge    Condition   Stable    Comment   --             Ce Lira, APRN  [address] [address]  [phone]          Tom Horn MD  [address] [place] [address] [address] [place] [address]  [phone]               Medication List      No changes were made to your prescriptions during this visit.          Marti Kemp, APRN  04/29/23 0836

## 2023-04-28 NOTE — DISCHARGE INSTRUCTIONS
Your x-ray does not reveal any acute fractures, you have likely sprained your knee.  As such, I am giving you a knee sleeve to help with compression and comfort.  You may take Tylenol as needed at home for pain.  I recommend rest, ice, and elevation as well.  Please follow-up with your PCP in the coming days and return to the ED with any new, worsening, or persistent symptoms.  I have also given you the phone number for orthopedics for follow-up as needed as well.

## 2023-04-28 NOTE — Clinical Note
[facility] EMERGENCY DEPARTMENT  [facility] [facility] [address]  Phone: [phone]    Lakeisha Dickey was seen and treated in our emergency department on 4/28/2023.  She may return to work on 04/29/2023.         Thank you for choosing [facility].    Marti Kemp APRN

## 2023-12-15 PROBLEM — F11.20 UNCOMPLICATED OPIOID DEPENDENCE (HCC): Status: ACTIVE | Noted: 2023-12-15

## 2023-12-26 ENCOUNTER — TELEPHONE (OUTPATIENT)
Dept: PSYCHIATRY | Age: 33
End: 2023-12-26

## 2023-12-26 NOTE — TELEPHONE ENCOUNTER
Pt left  stating she had recently been discharged from [address] [place] [address], that she was on Latuda 20 mg, would be 2 more weeks until her med mgmt f/u appt, wanting Dr Marquis Hassan to increase the May Jeans until she can see her new provider as not feeling as well. Pt not seen at [facility]. No answer when tried to call the pt back. Called GUNJAN Almonte and given the above info- also sent to her-she will F/U on this issue with the pt.

## 2024-01-01 ENCOUNTER — HOSPITAL ENCOUNTER (INPATIENT)
Age: 34
LOS: 2 days | Discharge: HOME | DRG: 753 | End: 2024-01-03
Attending: EMERGENCY MEDICINE | Admitting: PSYCHIATRY & NEUROLOGY
Payer: MEDICAID

## 2024-01-01 DIAGNOSIS — F32.A DEPRESSION WITH SUICIDAL IDEATION: Primary | ICD-10-CM

## 2024-01-01 DIAGNOSIS — R45.851 DEPRESSION WITH SUICIDAL IDEATION: Primary | ICD-10-CM

## 2024-01-01 LAB
ALBUMIN SERPL-MCNC: 4.5 G/DL (ref 3.5–5.2)
ALP SERPL-CCNC: 67 U/L (ref 35–104)
ALT SERPL-CCNC: 39 U/L (ref 5–33)
AMPHET UR QL SCN: NEGATIVE
ANION GAP SERPL CALCULATED.3IONS-SCNC: 10 MMOL/L (ref 7–19)
APAP SERPL-MCNC: <5 UG/ML (ref 10–30)
AST SERPL-CCNC: 23 U/L (ref 5–32)
BARBITURATES UR QL SCN: NEGATIVE
BASOPHILS # BLD: 0 K/UL (ref 0–0.2)
BASOPHILS NFR BLD: 0.3 % (ref 0–1)
BENZODIAZ UR QL SCN: NEGATIVE
BILIRUB SERPL-MCNC: 0.4 MG/DL (ref 0.2–1.2)
BUN SERPL-MCNC: 10 MG/DL (ref 6–20)
BUPRENORPHINE URINE: NEGATIVE
CALCIUM SERPL-MCNC: 8.9 MG/DL (ref 8.6–10)
CANNABINOIDS UR QL SCN: NEGATIVE
CHLORIDE SERPL-SCNC: 102 MMOL/L (ref 98–111)
CO2 SERPL-SCNC: 27 MMOL/L (ref 22–29)
COCAINE UR QL SCN: NEGATIVE
CREAT SERPL-MCNC: 0.8 MG/DL (ref 0.5–0.9)
DRUG SCREEN COMMENT UR-IMP: ABNORMAL
EOSINOPHIL # BLD: 0.2 K/UL (ref 0–0.6)
EOSINOPHIL NFR BLD: 2 % (ref 0–5)
ERYTHROCYTE [DISTWIDTH] IN BLOOD BY AUTOMATED COUNT: 13.4 % (ref 11.5–14.5)
ETHANOLAMINE SERPL-MCNC: <10 MG/DL (ref 0–0.08)
FENTANYL SCREEN, URINE: NEGATIVE
GLUCOSE SERPL-MCNC: 96 MG/DL (ref 74–109)
HCG SERPL QL: NEGATIVE
HCT VFR BLD AUTO: 40.4 % (ref 37–47)
HGB BLD-MCNC: 12.9 G/DL (ref 12–16)
IMM GRANULOCYTES # BLD: 0 K/UL
LITHIUM SERPL-MCNC: 0.1 MMOL/L (ref 0.6–1.2)
LYMPHOCYTES # BLD: 2.1 K/UL (ref 1.1–4.5)
LYMPHOCYTES NFR BLD: 18 % (ref 20–40)
MCH RBC QN AUTO: 27.7 PG (ref 27–31)
MCHC RBC AUTO-ENTMCNC: 31.9 G/DL (ref 33–37)
MCV RBC AUTO: 86.7 FL (ref 81–99)
METHADONE UR QL SCN: NEGATIVE
METHAMPHETAMINE, URINE: NEGATIVE
MONOCYTES # BLD: 0.5 K/UL (ref 0–0.9)
MONOCYTES NFR BLD: 4.5 % (ref 0–10)
NEUTROPHILS # BLD: 8.6 K/UL (ref 1.5–7.5)
NEUTS SEG NFR BLD: 74.9 % (ref 50–65)
OPIATES UR QL SCN: POSITIVE
OXYCODONE UR QL SCN: POSITIVE
PCP UR QL SCN: NEGATIVE
PLATELET # BLD AUTO: 334 K/UL (ref 130–400)
PMV BLD AUTO: 9.4 FL (ref 9.4–12.3)
POTASSIUM SERPL-SCNC: 3.8 MMOL/L (ref 3.5–5)
PROT SERPL-MCNC: 7.2 G/DL (ref 6.6–8.7)
RBC # BLD AUTO: 4.66 M/UL (ref 4.2–5.4)
SALICYLATES SERPL-MCNC: <0.3 MG/DL (ref 3–10)
SARS-COV-2 RDRP RESP QL NAA+PROBE: NOT DETECTED
SODIUM SERPL-SCNC: 139 MMOL/L (ref 136–145)
TRICYCLIC, URINE: NEGATIVE
WBC # BLD AUTO: 11.5 K/UL (ref 4.8–10.8)

## 2024-01-01 PROCEDURE — 84703 CHORIONIC GONADOTROPIN ASSAY: CPT

## 2024-01-01 PROCEDURE — 1240000000 HC EMOTIONAL WELLNESS R&B

## 2024-01-01 PROCEDURE — 36415 COLL VENOUS BLD VENIPUNCTURE: CPT

## 2024-01-01 PROCEDURE — 80143 DRUG ASSAY ACETAMINOPHEN: CPT

## 2024-01-01 PROCEDURE — 6370000000 HC RX 637 (ALT 250 FOR IP): Performed by: FAMILY MEDICINE

## 2024-01-01 PROCEDURE — 80179 DRUG ASSAY SALICYLATE: CPT

## 2024-01-01 PROCEDURE — 6370000000 HC RX 637 (ALT 250 FOR IP): Performed by: PSYCHIATRY & NEUROLOGY

## 2024-01-01 PROCEDURE — 87635 SARS-COV-2 COVID-19 AMP PRB: CPT

## 2024-01-01 PROCEDURE — 80178 ASSAY OF LITHIUM: CPT

## 2024-01-01 PROCEDURE — 85025 COMPLETE CBC W/AUTO DIFF WBC: CPT

## 2024-01-01 PROCEDURE — G0480 DRUG TEST DEF 1-7 CLASSES: HCPCS

## 2024-01-01 PROCEDURE — 99285 EMERGENCY DEPT VISIT HI MDM: CPT

## 2024-01-01 PROCEDURE — 80307 DRUG TEST PRSMV CHEM ANLYZR: CPT

## 2024-01-01 PROCEDURE — 80053 COMPREHEN METABOLIC PANEL: CPT

## 2024-01-01 PROCEDURE — 82077 ASSAY SPEC XCP UR&BREATH IA: CPT

## 2024-01-01 RX ORDER — POLYETHYLENE GLYCOL 3350 17 G/17G
17 POWDER, FOR SOLUTION ORAL DAILY PRN
Status: DISCONTINUED | OUTPATIENT
Start: 2024-01-01 | End: 2024-01-03 | Stop reason: HOSPADM

## 2024-01-01 RX ORDER — ACETAMINOPHEN 325 MG/1
650 TABLET ORAL EVERY 4 HOURS PRN
Status: DISCONTINUED | OUTPATIENT
Start: 2024-01-01 | End: 2024-01-03 | Stop reason: HOSPADM

## 2024-01-01 RX ORDER — HYDROXYZINE HYDROCHLORIDE 25 MG/1
25 TABLET, FILM COATED ORAL 3 TIMES DAILY PRN
Status: DISCONTINUED | OUTPATIENT
Start: 2024-01-01 | End: 2024-01-03 | Stop reason: HOSPADM

## 2024-01-01 RX ORDER — MECOBALAMIN 5000 MCG
5 TABLET,DISINTEGRATING ORAL NIGHTLY
Status: DISCONTINUED | OUTPATIENT
Start: 2024-01-01 | End: 2024-01-03 | Stop reason: HOSPADM

## 2024-01-01 RX ORDER — POLYETHYLENE GLYCOL 3350 17 G
2 POWDER IN PACKET (EA) ORAL
Status: DISCONTINUED | OUTPATIENT
Start: 2024-01-01 | End: 2024-01-03 | Stop reason: HOSPADM

## 2024-01-01 RX ORDER — FAMOTIDINE 20 MG/1
20 TABLET, FILM COATED ORAL 2 TIMES DAILY
Status: DISCONTINUED | OUTPATIENT
Start: 2024-01-01 | End: 2024-01-03 | Stop reason: HOSPADM

## 2024-01-01 RX ORDER — LEVOTHYROXINE SODIUM 0.03 MG/1
25 TABLET ORAL DAILY
Status: DISCONTINUED | OUTPATIENT
Start: 2024-01-01 | End: 2024-01-03 | Stop reason: HOSPADM

## 2024-01-01 RX ORDER — TRAZODONE HYDROCHLORIDE 50 MG/1
50 TABLET ORAL NIGHTLY
Status: DISCONTINUED | OUTPATIENT
Start: 2024-01-01 | End: 2024-01-03 | Stop reason: HOSPADM

## 2024-01-01 RX ORDER — LURASIDONE HYDROCHLORIDE 20 MG/1
20 TABLET, FILM COATED ORAL
Status: DISCONTINUED | OUTPATIENT
Start: 2024-01-01 | End: 2024-01-03 | Stop reason: HOSPADM

## 2024-01-01 RX ORDER — IBUPROFEN 400 MG/1
400 TABLET ORAL EVERY 6 HOURS PRN
Status: DISCONTINUED | OUTPATIENT
Start: 2024-01-01 | End: 2024-01-03 | Stop reason: HOSPADM

## 2024-01-01 RX ORDER — ERGOCALCIFEROL 1.25 MG/1
50000 CAPSULE ORAL WEEKLY
Status: DISCONTINUED | OUTPATIENT
Start: 2024-01-01 | End: 2024-01-03 | Stop reason: HOSPADM

## 2024-01-01 RX ORDER — NICOTINE 21 MG/24HR
1 PATCH, TRANSDERMAL 24 HOURS TRANSDERMAL DAILY
Status: DISCONTINUED | OUTPATIENT
Start: 2024-01-01 | End: 2024-01-03 | Stop reason: HOSPADM

## 2024-01-01 RX ORDER — LITHIUM CARBONATE 300 MG/1
300 CAPSULE ORAL
Status: DISCONTINUED | OUTPATIENT
Start: 2024-01-01 | End: 2024-01-03 | Stop reason: HOSPADM

## 2024-01-01 RX ADMIN — Medication 5 MG: at 20:33

## 2024-01-01 RX ADMIN — LURASIDONE HYDROCHLORIDE 20 MG: 20 TABLET, FILM COATED ORAL at 16:46

## 2024-01-01 RX ADMIN — NICOTINE POLACRILEX 2 MG: 2 LOZENGE ORAL at 16:46

## 2024-01-01 RX ADMIN — TRAZODONE HYDROCHLORIDE 50 MG: 50 TABLET ORAL at 20:33

## 2024-01-01 RX ADMIN — LITHIUM CARBONATE 300 MG: 300 CAPSULE, GELATIN COATED ORAL at 12:12

## 2024-01-01 RX ADMIN — IBUPROFEN 400 MG: 400 TABLET, FILM COATED ORAL at 20:33

## 2024-01-01 RX ADMIN — NICOTINE POLACRILEX 2 MG: 2 LOZENGE ORAL at 14:26

## 2024-01-01 RX ADMIN — HYDROXYZINE HYDROCHLORIDE 25 MG: 25 TABLET, FILM COATED ORAL at 20:32

## 2024-01-01 RX ADMIN — LITHIUM CARBONATE 300 MG: 300 CAPSULE, GELATIN COATED ORAL at 16:46

## 2024-01-01 RX ADMIN — IBUPROFEN 400 MG: 400 TABLET, FILM COATED ORAL at 14:26

## 2024-01-01 RX ADMIN — FAMOTIDINE 20 MG: 20 TABLET ORAL at 12:13

## 2024-01-01 RX ADMIN — LEVOTHYROXINE SODIUM 25 MCG: 25 TABLET ORAL at 12:12

## 2024-01-01 RX ADMIN — NICOTINE POLACRILEX 2 MG: 2 LOZENGE ORAL at 18:52

## 2024-01-01 RX ADMIN — ERGOCALCIFEROL 50000 UNITS: 1.25 CAPSULE ORAL at 12:12

## 2024-01-01 RX ADMIN — FAMOTIDINE 20 MG: 20 TABLET ORAL at 20:33

## 2024-01-01 SDOH — ECONOMIC STABILITY: FOOD INSECURITY: WITHIN THE PAST 12 MONTHS, YOU WORRIED THAT YOUR FOOD WOULD RUN OUT BEFORE YOU GOT MONEY TO BUY MORE.: NEVER TRUE

## 2024-01-01 SDOH — ECONOMIC STABILITY: INCOME INSECURITY: IN THE PAST 12 MONTHS, HAS THE ELECTRIC, GAS, OIL, OR WATER COMPANY THREATENED TO SHUT OFF SERVICE IN YOUR HOME?: NO

## 2024-01-01 SDOH — ECONOMIC STABILITY: INCOME INSECURITY: HOW HARD IS IT FOR YOU TO PAY FOR THE VERY BASICS LIKE FOOD, HOUSING, MEDICAL CARE, AND HEATING?: NOT HARD AT ALL

## 2024-01-01 ASSESSMENT — PATIENT HEALTH QUESTIONNAIRE - PHQ9
8. MOVING OR SPEAKING SO SLOWLY THAT OTHER PEOPLE COULD HAVE NOTICED. OR THE OPPOSITE, BEING SO FIGETY OR RESTLESS THAT YOU HAVE BEEN MOVING AROUND A LOT MORE THAN USUAL: 1
8. MOVING OR SPEAKING SO SLOWLY THAT OTHER PEOPLE COULD HAVE NOTICED. OR THE OPPOSITE, BEING SO FIGETY OR RESTLESS THAT YOU HAVE BEEN MOVING AROUND A LOT MORE THAN USUAL: 1
7. TROUBLE CONCENTRATING ON THINGS, SUCH AS READING THE NEWSPAPER OR WATCHING TELEVISION: 2
SUM OF ALL RESPONSES TO PHQ9 QUESTIONS 1 & 2: 6
6. FEELING BAD ABOUT YOURSELF - OR THAT YOU ARE A FAILURE OR HAVE LET YOURSELF OR YOUR FAMILY DOWN: 1
SUM OF ALL RESPONSES TO PHQ QUESTIONS 1-9: 20
5. POOR APPETITE OR OVEREATING: 0
2. FEELING DOWN, DEPRESSED OR HOPELESS: 3
9. THOUGHTS THAT YOU WOULD BE BETTER OFF DEAD, OR OF HURTING YOURSELF: 1
SUM OF ALL RESPONSES TO PHQ QUESTIONS 1-9: 20
4. FEELING TIRED OR HAVING LITTLE ENERGY: 2
9. THOUGHTS THAT YOU WOULD BE BETTER OFF DEAD, OR OF HURTING YOURSELF: 3
SUM OF ALL RESPONSES TO PHQ QUESTIONS 1-9: 14
10. IF YOU CHECKED OFF ANY PROBLEMS, HOW DIFFICULT HAVE THESE PROBLEMS MADE IT FOR YOU TO DO YOUR WORK, TAKE CARE OF THINGS AT HOME, OR GET ALONG WITH OTHER PEOPLE: 3
10. IF YOU CHECKED OFF ANY PROBLEMS, HOW DIFFICULT HAVE THESE PROBLEMS MADE IT FOR YOU TO DO YOUR WORK, TAKE CARE OF THINGS AT HOME, OR GET ALONG WITH OTHER PEOPLE: 3
2. FEELING DOWN, DEPRESSED OR HOPELESS: 3
6. FEELING BAD ABOUT YOURSELF - OR THAT YOU ARE A FAILURE OR HAVE LET YOURSELF OR YOUR FAMILY DOWN: 2
SUM OF ALL RESPONSES TO PHQ QUESTIONS 1-9: 14
SUM OF ALL RESPONSES TO PHQ QUESTIONS 1-9: 20
3. TROUBLE FALLING OR STAYING ASLEEP: 3
4. FEELING TIRED OR HAVING LITTLE ENERGY: 3
1. LITTLE INTEREST OR PLEASURE IN DOING THINGS: 3
3. TROUBLE FALLING OR STAYING ASLEEP: 3
SUM OF ALL RESPONSES TO PHQ QUESTIONS 1-9: 14
SUM OF ALL RESPONSES TO PHQ QUESTIONS 1-9: 13
SUM OF ALL RESPONSES TO PHQ9 QUESTIONS 1 & 2: 6
7. TROUBLE CONCENTRATING ON THINGS, SUCH AS READING THE NEWSPAPER OR WATCHING TELEVISION: 0
1. LITTLE INTEREST OR PLEASURE IN DOING THINGS: 3
5. POOR APPETITE OR OVEREATING: 0
SUM OF ALL RESPONSES TO PHQ QUESTIONS 1-9: 17

## 2024-01-01 ASSESSMENT — LIFESTYLE VARIABLES
HOW OFTEN DO YOU HAVE A DRINK CONTAINING ALCOHOL: NEVER
HOW OFTEN DO YOU HAVE A DRINK CONTAINING ALCOHOL: NEVER
HOW MANY STANDARD DRINKS CONTAINING ALCOHOL DO YOU HAVE ON A TYPICAL DAY: PATIENT DOES NOT DRINK
HOW MANY STANDARD DRINKS CONTAINING ALCOHOL DO YOU HAVE ON A TYPICAL DAY: PATIENT DOES NOT DRINK

## 2024-01-01 ASSESSMENT — ENCOUNTER SYMPTOMS
BACK PAIN: 0
COUGH: 0
VOMITING: 0
NAUSEA: 0
DIARRHEA: 0
RHINORRHEA: 0
SORE THROAT: 0
SHORTNESS OF BREATH: 0
ABDOMINAL PAIN: 0

## 2024-01-01 ASSESSMENT — PAIN SCALES - GENERAL
PAINLEVEL_OUTOF10: 6
PAINLEVEL_OUTOF10: 6

## 2024-01-01 ASSESSMENT — SLEEP AND FATIGUE QUESTIONNAIRES
SLEEP PATTERN: DIFFICULTY FALLING ASLEEP;INSOMNIA
DO YOU HAVE DIFFICULTY SLEEPING: YES
AVERAGE NUMBER OF SLEEP HOURS: 4
DO YOU USE A SLEEP AID: YES

## 2024-01-01 ASSESSMENT — PAIN DESCRIPTION - FREQUENCY: FREQUENCY: CONTINUOUS

## 2024-01-01 ASSESSMENT — PAIN - FUNCTIONAL ASSESSMENT
PAIN_FUNCTIONAL_ASSESSMENT: ACTIVITIES ARE NOT PREVENTED
PAIN_FUNCTIONAL_ASSESSMENT: ACTIVITIES ARE NOT PREVENTED
PAIN_FUNCTIONAL_ASSESSMENT: PREVENTS OR INTERFERES SOME ACTIVE ACTIVITIES AND ADLS

## 2024-01-01 ASSESSMENT — PAIN DESCRIPTION - PAIN TYPE
TYPE: CHRONIC PAIN
TYPE: CHRONIC PAIN

## 2024-01-01 ASSESSMENT — PAIN DESCRIPTION - LOCATION
LOCATION: KNEE

## 2024-01-01 ASSESSMENT — PAIN DESCRIPTION - ORIENTATION
ORIENTATION: LEFT
ORIENTATION: LEFT

## 2024-01-01 ASSESSMENT — PAIN SCALES - WONG BAKER: WONGBAKER_NUMERICALRESPONSE: 0

## 2024-01-01 ASSESSMENT — PAIN DESCRIPTION - DESCRIPTORS
DESCRIPTORS: ACHING
DESCRIPTORS: ACHING

## 2024-01-01 NOTE — PLAN OF CARE
Problem: Anxiety  Goal: Will report anxiety at manageable levels  Description: INTERVENTIONS:  1. Administer medication as ordered  2. Teach and rehearse alternative coping skills  3. Provide emotional support with 1:1 interaction with staff  Recent Flowsheet Documentation  Taken 1/1/2024 1120 by Natalee Graham RN  Will report anxiety at manageable levels:   Administer medication as ordered   Provide emotional support with 1:1 interaction with staff

## 2024-01-01 NOTE — H&P
HISTORY and PHYSICAL      CHIEF COMPLAINT:  Psychosis, SI    Reason for Admission:  Psychosis, SI    History Obtained From:  patient, chart    HISTORY OF PRESENT ILLNESS:      The patient is a 33 y.o. female who is admitted to the [facility] unit with worsening mood issues. She has had no new medical issues. No c/o chest pain or SOA. No abdominal pain or N/V. She has had no dysuria. She has been eating. No HA or dizziness. No fevers.   Past Medical History:        Diagnosis Date    ADHD (attention deficit hyperactivity disorder)     Anxiety     Depression     History of narcotic addiction ([facility])     Schizophrenia ([facility])     Substance abuse ([facility])      Past Surgical History:        Procedure Laterality Date    CYST REMOVAL      right ovary    CYST REMOVAL      wrist         Medications Prior to Admission:    Medications Prior to Admission: traZODone (DESYREL) 50 MG tablet, Take 1 tablet by mouth nightly  lurasidone (LATUDA) 20 MG TABS tablet, Take 1 tablet by mouth Daily with supper  melatonin 5 MG TBDP disintegrating tablet, Take 1 tablet by mouth nightly  Ergocalciferol (VITAMIN D) 30031 units CAPS, Take 50,000 Units by mouth once a week  famotidine (PEPCID) 20 MG tablet, Take 1 tablet by mouth 2 times daily  levothyroxine (SYNTHROID) 25 MCG tablet, Take 1 tablet by mouth Daily  lithium (LITHOBID) 300 MG extended release tablet, Take 1 tablet by mouth 3 times daily  HYDROcodone-acetaminophen (NORCO) 5-325 MG per tablet, Take 1 tablet by mouth every 6 hours as needed for Pain.    Allergies:  Tramadol    Social History:   TOBACCO:   reports that she has been smoking cigarettes. She has been exposed to tobacco smoke. She has never used smokeless tobacco.  ETOH:   reports no history of alcohol use.  DRUGS:   reports that she does not currently use drugs after having used the following drugs: Methamphetamines (Crystal Meth).  MARITAL STATUS:  single  OCCUPATION:  not

## 2024-01-01 NOTE — ED PROVIDER NOTES
[facility] EMERGENCY DEPT  eMERGENCY dEPARTMENT eNCOUnter      Pt Name: Deanna Millan  MRN: [id number]  Birthdate 1990  Date of evaluation: 1/1/2024  Provider: MAEVE ERNANDEZ MD    CHIEF COMPLAINT       Chief Complaint   Patient presents with    Mental Health Problem     Got out og  unit on 12/19 but voices are still there. SI without specific plan and no AH or HI however still hearing voices telling her to kill herself         HISTORY OF PRESENT ILLNESS   (Location/Symptom, Timing/Onset,Context/Setting, Quality, Duration, Modifying Factors, Severity)  Note limiting factors.   Deanna Millan is a 33 y.o. female who presents to the emergency department for mental health evaluation.  Patient was recently admitted in December and states that admission did help her but over the past several days has had return of auditory hallucinations with voices telling her to harm herself.  Admits to suicidal thoughts denies any specific plan.  No visual hallucinations.  History of schizoaffective disorder.  Denies any homicidal thoughts.    The history is provided by the patient.       NursingNotes were reviewed.    REVIEW OF SYSTEMS    (2-9 systems for level 4, 10 or more for level 5)     Review of Systems   Constitutional:  Negative for chills and fever.   HENT:  Negative for rhinorrhea and sore throat.    Respiratory:  Negative for cough and shortness of breath.    Cardiovascular:  Negative for chest pain and leg swelling.   Gastrointestinal:  Negative for abdominal pain, diarrhea, nausea and vomiting.   Genitourinary:  Negative for dysuria, frequency and urgency.   Musculoskeletal:  Negative for back pain and neck pain.   Neurological:  Negative for dizziness and headaches.   Psychiatric/Behavioral:  Positive for dysphoric mood, hallucinations and suicidal ideas.    All other systems reviewed and are negative.           PAST MEDICALHISTORY     Past Medical History:   Diagnosis Date    ADHD (attention deficit hyperactivity disorder)

## 2024-01-02 LAB
25(OH)D3 SERPL-MCNC: 20.4 NG/ML
HBA1C MFR BLD: 5 % (ref 4–6)
TSH SERPL DL<=0.005 MIU/L-ACNC: 1.6 UIU/ML (ref 0.35–5.5)

## 2024-01-02 PROCEDURE — 83036 HEMOGLOBIN GLYCOSYLATED A1C: CPT

## 2024-01-02 PROCEDURE — 6370000000 HC RX 637 (ALT 250 FOR IP): Performed by: PSYCHIATRY & NEUROLOGY

## 2024-01-02 PROCEDURE — 99223 1ST HOSP IP/OBS HIGH 75: CPT | Performed by: PSYCHIATRY & NEUROLOGY

## 2024-01-02 PROCEDURE — 1240000000 HC EMOTIONAL WELLNESS R&B

## 2024-01-02 PROCEDURE — 84443 ASSAY THYROID STIM HORMONE: CPT

## 2024-01-02 PROCEDURE — 36415 COLL VENOUS BLD VENIPUNCTURE: CPT

## 2024-01-02 PROCEDURE — 82306 VITAMIN D 25 HYDROXY: CPT

## 2024-01-02 PROCEDURE — 6370000000 HC RX 637 (ALT 250 FOR IP): Performed by: FAMILY MEDICINE

## 2024-01-02 RX ADMIN — LITHIUM CARBONATE 300 MG: 300 CAPSULE, GELATIN COATED ORAL at 08:02

## 2024-01-02 RX ADMIN — FAMOTIDINE 20 MG: 20 TABLET ORAL at 08:02

## 2024-01-02 RX ADMIN — FAMOTIDINE 20 MG: 20 TABLET ORAL at 20:53

## 2024-01-02 RX ADMIN — TRAZODONE HYDROCHLORIDE 50 MG: 50 TABLET ORAL at 20:52

## 2024-01-02 RX ADMIN — ACETAMINOPHEN 650 MG: 325 TABLET ORAL at 10:29

## 2024-01-02 RX ADMIN — NICOTINE POLACRILEX 2 MG: 2 LOZENGE ORAL at 18:26

## 2024-01-02 RX ADMIN — NICOTINE POLACRILEX 2 MG: 2 LOZENGE ORAL at 06:59

## 2024-01-02 RX ADMIN — NICOTINE POLACRILEX 2 MG: 2 LOZENGE ORAL at 09:00

## 2024-01-02 RX ADMIN — LEVOTHYROXINE SODIUM 25 MCG: 25 TABLET ORAL at 06:16

## 2024-01-02 RX ADMIN — NICOTINE POLACRILEX 2 MG: 2 LOZENGE ORAL at 15:23

## 2024-01-02 RX ADMIN — HYDROXYZINE HYDROCHLORIDE 25 MG: 25 TABLET, FILM COATED ORAL at 20:53

## 2024-01-02 RX ADMIN — LITHIUM CARBONATE 300 MG: 300 CAPSULE, GELATIN COATED ORAL at 16:19

## 2024-01-02 RX ADMIN — LITHIUM CARBONATE 300 MG: 300 CAPSULE, GELATIN COATED ORAL at 11:53

## 2024-01-02 RX ADMIN — LURASIDONE HYDROCHLORIDE 20 MG: 20 TABLET, FILM COATED ORAL at 16:20

## 2024-01-02 RX ADMIN — Medication 5 MG: at 20:52

## 2024-01-02 ASSESSMENT — PAIN SCALES - WONG BAKER: WONGBAKER_NUMERICALRESPONSE: 0

## 2024-01-02 ASSESSMENT — PAIN SCALES - GENERAL
PAINLEVEL_OUTOF10: 4
PAINLEVEL_OUTOF10: 0

## 2024-01-02 ASSESSMENT — PAIN DESCRIPTION - DESCRIPTORS: DESCRIPTORS: ACHING

## 2024-01-02 ASSESSMENT — PAIN - FUNCTIONAL ASSESSMENT: PAIN_FUNCTIONAL_ASSESSMENT: ACTIVITIES ARE NOT PREVENTED

## 2024-01-02 ASSESSMENT — PAIN DESCRIPTION - LOCATION: LOCATION: HEAD

## 2024-01-02 NOTE — PLAN OF CARE
Problem: Depression  Goal: Will be euthymic at discharge  Description: INTERVENTIONS:  1. Administer medication as ordered  2. Provide emotional support via 1:1 interaction with staff  3. Encourage involvement in milieu/groups/activities  4. Monitor for social isolation  Outcome: Progressing     Problem: Anxiety  Goal: Will report anxiety at manageable levels  Description: INTERVENTIONS:  1. Administer medication as ordered  2. Teach and rehearse alternative coping skills  3. Provide emotional support with 1:1 interaction with staff  1/2/2024 1418 by Lance Hong RN  Outcome: Progressing  1/2/2024 1137 by Krystal Ridley  Outcome: Progressing  1/2/2024 1117 by Kaylene Munroe, MSW  Outcome: Progressing     Problem: Psychosis  Goal: Will report no hallucinations or delusions  Description: INTERVENTIONS:  1. Administer medication as  ordered  2. Assist with reality testing to support increasing orientation  3. Assess if patient's hallucinations or delusions are encouraging self harm or harm to others and intervene as appropriate  Outcome: Progressing

## 2024-01-02 NOTE — H&P
Department of Psychiatry  Attending History and Physical - Adult         CHIEF COMPLAINT: Auditory hallucinations, suicidal ideations    History obtained from:  patient    HISTORY OF PRESENT ILLNESS:          The patient is a 33 y.o. female with previous psychiatric history of depression, anxiety, schizoaffective disorder versus drug-induced psychosis, opioid misuse, stimulants abuse, who has been admitted to our psychiatric unit secondary to auditory hallucinations and suicidal ideations.    Patient is well-known to psychiatry due to previous admissions to our psychiatric unit with same clinical presentation, as at present time.  Last time patient was admitted to our psychiatric unit 2 weeks ago.    Patient has been seen in treatment team room with presence of the patient's nurse.  She reported that after last discharge from the hospital she supposed to follow in the [facility], however, stated that she did not want to go there and rescheduled her appointments in [facility] with her first appointment in 2 days.  Patient reported that she was taking only medications, which were provided to her in the hospital at time of discharge and she did not refill the rest of her medications, so she was noncompliant with majority of prescribed medications for last 2 weeks.  She reported that she has been experiencing auditory hallucinations and described her voices as multiple unfamiliar voices, which tell her to harm herself.  She denies auditory and visual hallucinations during the interview, stated that last time when she experienced hallucinations is 2 days ago, and it did not seem that patient was responding to internal stimuli.    Today during the interview, patient endorses feeling of depression, decreased motivation, decreased concentration, decreased energy level.  She endorses fair appetite and fair quality of sleep at home.  Patient denies feeling of hopelessness, helplessness and

## 2024-01-02 NOTE — PLAN OF CARE
Problem: Anxiety  Goal: Will report anxiety at manageable levels  1/2/2024 1137 by Krystal Ridley  Outcome: Progressing      Group Therapy Note     Date: 1/2/2024  Start Time: 1100  End Time:  1130  Number of Participants: 9     Type of Group: Psychotherapy     Wellness Binder Information  Module Name:  emotional wellness  Session Number:  5     Patient's Goal:  obstacles to emotional wellness     Notes:  pt acknowledged negative thinking as an obstacle to emotional wellness.     Status After Intervention:  Improved     Participation Level: Interactive     Participation Quality: Appropriate, Attentive, and Sharing        Speech:  normal        Thought Process/Content: Logical        Affective Functioning: Congruent        Mood: congruent        Level of consciousness:  Alert, Oriented x4, and Attentive        Response to Learning: Able to verbalize current knowledge/experience        Endings: None Reported     Modes of Intervention: Education        Discipline Responsible: Psychoeducational Specialist        Signature:  Krystal Ridley

## 2024-01-02 NOTE — PLAN OF CARE
Problem: Anxiety  Goal: Will report anxiety at manageable levels  Description: INTERVENTIONS:  1. Administer medication as ordered  2. Teach and rehearse alternative coping skills  3. Provide emotional support with 1:1 interaction with staff  1/2/2024 1117 by Kaylene Munroe MSW  Outcome: Progressing                                                                           Group Note    Date: 01/02/24  Start Time: 10:00 AM   End Time:10:30 AM     Number of Participants: 11    Type of Group: Activity     Patient's Goal: Motivating patient to interact and express different emotions through writing in group.     Notes: Encouraged patient to participate and discuss with group members     Participation Level: Active Listener    Participation Quality: Appropriate    Speech:  normal    Thought Process/Content: Logical    Mood:  calm    Level of consciousness:  Alert    Response to Learning: Able to retain information    Modes of Intervention: Education and Support    Discipline Responsible: /Counselor     Signature:  DOUGIE Miranda

## 2024-01-02 NOTE — RESEARCH
Collateral obtained from: [place] (Father) [phone]     Immediate Stressors & Time Episode Began: Father reports \"hearing her voices again. She was arguing with them. The voices were bothering her so much. If she gets a pain pill that will calm them down. She will listen to music to calm them down but she will sit and talk to them in her bedroom. She has had voices for years but sometimes it is worse than others. I give her medication to her. I control the medication. She will get a hold of a pain pill every once in a while and that helps shut them up. She used to take a lot of pills but has not in a long time. The addiction part has not been dealt with.\"  Father states this is an on going problem and she has not dealt with her addiction. He reports that she was taking methadone in the past and feels that it helped her with the voices. Reports that he does not believe that the voices will be solved until she has had help with her addiction problem. Father is wanting us to discuss rehab. Father reports that if she is wanting to go to rehab he is feeling to help provide transportation. He reports that they are down to one vehicle but will make it work. Father reports that he wants her to go to [email or web address]. Reports that [facility] would work with them on the out of pocket cost due to patient being unemployed as well as living at home.  Patient did not attend follow-up appointments after discharge per father. Patient currently has an appointment for telehealth at [email or web address]. Father states that they had technologically issues with attending her telehealth appointment due to being in a different state.     Diagnosis/Hx of compliance with meds: Father reports that she is medication compliant due to father handling the medication.     Tx Hx/Past hospitalizations:  caller reports previous inpatient treatment history --- history includes [facility] and [facility]. Father is currently

## 2024-01-02 NOTE — PLAN OF CARE
Problem: Anxiety  Goal: Will report anxiety at manageable levels  Description: INTERVENTIONS:  1. Administer medication as ordered  2. Teach and rehearse alternative coping skills  3. Provide emotional support with 1:1 interaction with staff  1/2/2024 1723 by Kaylene Munroe MSW  Outcome: Progressing                                                                Group Note     Date: 01/02/24  Start Time: 2:00 PM   End Time: 3:30 PM      Number of Participants: 7     Type of Group: Activity      Patient's Goal: Motivating patient to interact and express different emotions in group.      Notes: Encouraged patient to participate and discuss with group members     Participation Level: Active Listener     Participation Quality: Appropriate     Speech:  normal     Thought Process/Content: Logical     Mood:  calm     Level of consciousness:  Alert     Response to Learning: Able to retain information     Modes of Intervention: Education and Support     Discipline Responsible: /Counselor      Signature:  DOUGIE Miranda

## 2024-01-03 VITALS
HEIGHT: 67 IN | WEIGHT: 225.2 LBS | SYSTOLIC BLOOD PRESSURE: 127 MMHG | DIASTOLIC BLOOD PRESSURE: 79 MMHG | TEMPERATURE: 97.5 F | HEART RATE: 105 BPM | OXYGEN SATURATION: 98 % | BODY MASS INDEX: 35.35 KG/M2 | RESPIRATION RATE: 16 BRPM

## 2024-01-03 PROCEDURE — 6370000000 HC RX 637 (ALT 250 FOR IP): Performed by: PSYCHIATRY & NEUROLOGY

## 2024-01-03 PROCEDURE — 99239 HOSP IP/OBS DSCHRG MGMT >30: CPT | Performed by: PSYCHIATRY & NEUROLOGY

## 2024-01-03 PROCEDURE — 5130000000 HC BRIDGE APPOINTMENT

## 2024-01-03 PROCEDURE — 6370000000 HC RX 637 (ALT 250 FOR IP): Performed by: FAMILY MEDICINE

## 2024-01-03 RX ORDER — HYDROXYZINE HYDROCHLORIDE 25 MG/1
25 TABLET, FILM COATED ORAL 3 TIMES DAILY PRN
Qty: 42 TABLET | Refills: 0 | Status: SHIPPED | OUTPATIENT
Start: 2024-01-03

## 2024-01-03 RX ORDER — LURASIDONE HYDROCHLORIDE 20 MG/1
20 TABLET, FILM COATED ORAL
Qty: 14 TABLET | Refills: 0 | Status: SHIPPED | OUTPATIENT
Start: 2024-01-03

## 2024-01-03 RX ORDER — MECOBALAMIN 5000 MCG
5 TABLET,DISINTEGRATING ORAL NIGHTLY
Qty: 14 TABLET | Refills: 0 | Status: SHIPPED | OUTPATIENT
Start: 2024-01-03

## 2024-01-03 RX ORDER — LITHIUM CARBONATE 300 MG/1
300 TABLET, FILM COATED, EXTENDED RELEASE ORAL 3 TIMES DAILY
Qty: 42 TABLET | Refills: 0 | Status: SHIPPED | OUTPATIENT
Start: 2024-01-03

## 2024-01-03 RX ORDER — LEVOTHYROXINE SODIUM 0.03 MG/1
25 TABLET ORAL DAILY
Qty: 14 TABLET | Refills: 0 | Status: SHIPPED | OUTPATIENT
Start: 2024-01-03

## 2024-01-03 RX ORDER — TRAZODONE HYDROCHLORIDE 50 MG/1
50 TABLET ORAL NIGHTLY
Qty: 14 TABLET | Refills: 0 | Status: SHIPPED | OUTPATIENT
Start: 2024-01-03

## 2024-01-03 RX ORDER — ERGOCALCIFEROL 1.25 MG/1
50000 CAPSULE ORAL WEEKLY
Qty: 2 CAPSULE | Refills: 0 | Status: SHIPPED | OUTPATIENT
Start: 2024-01-03

## 2024-01-03 RX ORDER — FAMOTIDINE 20 MG/1
20 TABLET, FILM COATED ORAL 2 TIMES DAILY
Qty: 14 TABLET | Refills: 0 | Status: SHIPPED | OUTPATIENT
Start: 2024-01-03

## 2024-01-03 RX ADMIN — FAMOTIDINE 20 MG: 20 TABLET ORAL at 08:28

## 2024-01-03 RX ADMIN — LEVOTHYROXINE SODIUM 25 MCG: 25 TABLET ORAL at 06:11

## 2024-01-03 RX ADMIN — HYDROXYZINE HYDROCHLORIDE 25 MG: 25 TABLET, FILM COATED ORAL at 04:42

## 2024-01-03 RX ADMIN — LITHIUM CARBONATE 300 MG: 300 CAPSULE, GELATIN COATED ORAL at 08:28

## 2024-01-03 NOTE — DISCHARGE INSTRUCTIONS
Depression and Chronic Disease: Care Instructions  Overview     A chronic disease is one that you have for a long time. Some chronic diseases can be managed, but they usually cannot be cured. Depression is common in people with chronic diseases.  If you have depression, it's not your fault. Depression is a common mental health condition. And it may get better with treatment. Medicines, counseling, and self-care can all help.  Follow-up care is a key part of your treatment and safety. Be sure to make and go to all appointments, and call your doctor if you are having problems. It's also a good idea to know your test results and keep a list of the medicines you take.  How can you care for yourself at home?  Watch for symptoms of depression  The symptoms of depression are often subtle at first. You may think they are caused by your disease rather than depression. Or you may think it is normal to be depressed when you have a chronic disease.  If you are depressed you may:  Feel sad or hopeless.  Feel guilty or worthless.  Not enjoy the things you used to enjoy.  Feel hopeless, as though life is not worth living.  Have trouble thinking or remembering.  Have low energy, and you may not eat or sleep well.  Pull away from others.  Think often about death or killing yourself.  Get treatment  By treating your depression, you can feel more hopeful and have more energy. If you feel better, you may take better care of yourself, so your health may improve.  Talk to your doctor if you have any changes in mood during treatment for your disease.  Ask your doctor for help. Counseling, antidepressant medicine, or a combination of the two can help most people with depression. Often a combination works best. Counseling can also help you cope with having a chronic disease.  Where to get help 24 hours a day, 7 days a week   If you or someone you know talks about suicide, self-harm, a mental health crisis, a substance use crisis, or

## 2024-01-03 NOTE — PSYCHOTHERAPY
Group Therapy Note    Date: 1/3/2024  Start Time: 1330  End Time:  1400  Number of Participants: 5    Type of Group: SPIRITUALITY     Wellness Binder Information  Module Name:  Mindfulness  Session Number:      Patient's Goal: To rest the mind      Notes:      Status After Intervention:  Improved    Participation Level: Active Listener and Interactive    Participation Quality: Appropriate and Attentive      Speech:  normal      Thought Process/Content:       Affective Functioning: Congruent      Mood:  calm      Level of consciousness:  Oriented x4      Response to Learning: Able to verbalize current knowledge/experience, Able to verbalize/acknowledge new learning, and Capable of insight      Endings:     Modes of Intervention: Education and Support      Discipline Responsible:       Signature:  Eulalio Mcdonald MA [facility]

## 2024-01-03 NOTE — DISCHARGE INSTR - DIET

## 2024-01-03 NOTE — PLAN OF CARE
Problem: Anxiety  Goal: Will report anxiety at manageable levels  Outcome: Progressing      Group Therapy Note     Date: 1/3/2024  Start Time: 1100  End Time:  1130  Number of Participants: 6     Type of Group: Psychoeducation     Wellness Binder Information  Module Name:  staying well  Session Number:  1     Patient's Goal:  daily maintenance and coping skills     Notes:  pt acknowledged use of positive coping skills daily to help stay well     Status After Intervention:  Improved     Participation Level: Interactive     Participation Quality: Appropriate, Attentive, and Sharing        Speech:  normal        Thought Process/Content: Logical        Affective Functioning: Congruent        Mood: congruent        Level of consciousness:  Alert, Oriented x4, and Attentive        Response to Learning: Able to verbalize current knowledge/experience        Endings: None Reported     Modes of Intervention: Education        Discipline Responsible: Psychoeducational Specialist        Signature:  Krystal Ridley

## 2024-01-03 NOTE — DISCHARGE SUMMARY
Discharge Summary     Patient ID:  Deanna Millan  [id number]  33 y.o.  1990    Admit date: 1/1/2024  Discharge date: 1/3/2024    Admitting Physician: Violet Hamilton MD   Attending Physician: Violet Hamilton MD  Discharge Provider: VIOLET HAMILTON MD     Admission Diagnoses: Depression with suicidal ideation [F32.A, R45.851]  Psychosis, unspecified psychosis type (HCC) [F29]    Discharge Diagnoses: Psychosis, unspecified, resolved  Bipolar depression  Tobacco use disorder  History of opioid abuse  History of stimulants abuse  Treatment noncompliance     Medical conditions pertinent to the patient's mental health  Hypothyroidism  Vitamin D deficiency    Admission Condition: poor    Discharged Condition: stable    Indication for Admission: Auditory hallucinations, suicidal ideations     HPI:   The patient is a 33 y.o. female with previous psychiatric history of depression, anxiety, schizoaffective disorder versus drug-induced psychosis, opioid misuse, stimulants abuse, who has been admitted to our psychiatric unit secondary to auditory hallucinations and suicidal ideations.     Patient is well-known to psychiatry due to previous admissions to our psychiatric unit with same clinical presentation, as at present time.  Last time patient was admitted to our psychiatric unit 2 weeks ago.     Patient has been seen in treatment team room with presence of the patient's nurse.  She reported that after last discharge from the hospital she supposed to follow in the [facility], however, stated that she did not want to go there and rescheduled her appointments in [facility] with her first appointment in 2 days.  Patient reported that she was taking only medications, which were provided to her in the hospital at time of discharge and she did not refill the rest of her medications, so she was noncompliant with majority of prescribed medications for last 2 weeks.  She reported that she has been experiencing

## 2024-01-04 NOTE — CARE COORDINATION
SW attempted to contact patient a second time on 01/04/2024. Patients voicemail box is not set up at this time

## 2024-01-04 NOTE — CARE COORDINATION
SW attempted to contact patient on 01/04/2024. Patients voicemail box is not set up at this time.

## 2024-01-04 NOTE — PLAN OF CARE
Problem: Self Harm/Suicidality  Goal: Will have no self-injury during hospital stay  Description: INTERVENTIONS:  1.  Ensure constant observer at bedside with Q15M safety checks  2.  Maintain a safe environment  3.  Secure patient belongings  4.  Ensure family/visitors adhere to safety recommendations  5.  Ensure safety tray has been added to patient's diet order  6.  Every shift and PRN: Re-assess suicidal risk via Frequent Screener    Outcome: Adequate for Discharge     Problem: Depression  Goal: Will be euthymic at discharge  Description: INTERVENTIONS:  1. Administer medication as ordered  2. Provide emotional support via 1:1 interaction with staff  3. Encourage involvement in milieu/groups/activities  4. Monitor for social isolation  Outcome: Adequate for Discharge     Problem: Psychosis  Goal: Will report no hallucinations or delusions  Description: INTERVENTIONS:  1. Administer medication as  ordered  2. Assist with reality testing to support increasing orientation  3. Assess if patient's hallucinations or delusions are encouraging self harm or harm to others and intervene as appropriate  Outcome: Adequate for Discharge     Problem: Anxiety  Goal: Will report anxiety at manageable levels  Description: INTERVENTIONS:  1. Administer medication as ordered  2. Teach and rehearse alternative coping skills  3. Provide emotional support with 1:1 interaction with staff  1/3/2024 1842 by Yimi Livingston RN  Outcome: Adequate for Discharge  1/3/2024 1153 by Krystal Ridley  Outcome: Progressing     Problem: Sleep Disturbance  Goal: Will exhibit normal sleeping pattern  Description: INTERVENTIONS:  1. Administer medication as ordered  2. Decrease environmental stimuli, including noise, as appropriate  3. Discourage social isolation and naps during the day  Outcome: Adequate for Discharge     Problem: Spiritual Care  Goal: Pt/Family able to move forward in process of forgiving self, others, and/or higher

## 2024-01-04 NOTE — PROGRESS NOTES
Group Note    Date: 01/01/24  Start Time: 8:00 PM   End Time:8:30 PM     Number of Participants: 8    Type of Group: Community/Goal     Patient's Goal:  Continue with medication adjustments.    Notes:    Reports energy level low, and still having hallucinations on/off today.    Status After Intervention:  Unchanged    Participation Level: Minimal    Participation Quality: Appropriate    Speech:  normal    Thought Process/Content: Logical    Mood: depressed    Level of consciousness:  Alert    Response to Learning: Able to verbalize current knowledge/experience, Able to verbalize/acknowledge new learning, Able to retain information, Capable of insight, and Able to change behavior    Modes of Intervention: Education and Support    Discipline Responsible: Registered Nurse     Signature:  Natalee Graham RN  
                                                                Group Note    Date: 01/02/24  Start Time: 7:30 PM   End Time:8:00 PM     Number of Participants: 5    Type of Group: Wrap-Up     Status After Intervention:  Unchanged    Participation Level: Interactive    Participation Quality: Appropriate    Speech:  normal    Thought Process/Content: Logical    Mood:  calm    Level of consciousness:  Alert and Oriented x4    Response to Learning: Able to verbalize current knowledge/experience    Modes of Intervention: Education and Support    Discipline Responsible: Licensed Practical Nurse     Signature:  Renee Estrada LPN   
                                                                Group Note    Date: 01/02/24  Start Time: 8:00 AM   End Time:8:30 AM     Number of Participants: 10    Type of Group: Community/Goal     Patient's Goal:  \"Probably trying to go home, I guess I'm feeling a lot better, just needed adjustment in environment\"    Notes:      Status After Intervention:      Participation Level: Active Listener    Participation Quality: Appropriate    Speech:  normal    Thought Process/Content: Logical    Mood:  Calm    Level of consciousness:  Alert    Response to Learning: Able to verbalize current knowledge/experience    Modes of Intervention: Education and Support    Discipline Responsible: Behavioral Health Technician     Signature:  Alberto Nelson  
                                                                Group Note    Date: 01/03/24  Start Time: 8:00 AM   End Time:8:30 AM     Number of Participants: 10    Type of Group: Community/Goal     Patient's Goal:  \"Going home hopefully\"    Notes:      Status After Intervention:      Participation Level: Active Listener    Participation Quality: Appropriate    Speech:  normal    Thought Process/Content: Logical    Mood:  Calm    Level of consciousness:  Alert    Response to Learning: Able to verbalize current knowledge/experience    Modes of Intervention: Education and Support    Discipline Responsible: Behavioral Health Technician     Signature:  Alberto Nelson  
                                                  Admission Note      Reason for admission/Target Symptom: Per nursing admission assessment - Reason for Admission: 33 year old female that presents to the emergency department with suicidal ideations, thoughts to overdose, intent to overdose, but wants help before reaching that point. Tells that she is heraing multiple voices, inability to sleep, complete hygeine, and isolating. Patient with several admission to [facility]. Past history of substance abuse. Patient reports going to [facility] for outpatient services.    Diagnoses: Depression NOS   UDS: Opiate and Oxycodone   BAL:  <10    SW will meet with treatment team to discuss patient's treatment including care planning, discharge planning, and follow-up needs. Patient has been admitted to [facility].     Treatment team will identify the patient's discharge needs. Appointments will be made for medication management and outpatient therapy/counseling. Pt confirmed the need for ongoing treatment post inpatient stay. Pt was also provided a handout of contact information for drug and alcohol treatment centers and other community support service such as [facility]EL, AA, and [facility].  
                                                 Treatment Team Note:    Target Symptoms/Reason for admission: Per nursing admission assessment - Reason for Admission: 33 year old female that presents to the emergency department with suicidal ideations, thoughts to overdose, intent to overdose, but wants help before reaching that point. Tells that she is heraing multiple voices, inability to sleep, complete hygeine, and isolating. Patient with several admission to [facility]. Past history of substance abuse. Patient reports going to [facility] for outpatient services.    Diagnoses per psych provider: Depression with suicidal ideation [F32.A, R45.851]  Psychosis, unspecified psychosis type (HCC) [F29]    Therapist met with treatment team to discuss patients treatment and discharge plans.    Patient's aftercare plan is: SW will meet with patient to gather information    Aftercare appointments made: No - SW will make discharge appointments    Pt lives with: SW will meet with patient to gather information    Collateral obtained from: SW will meet with patient to gather information  Collateral obtained on:new admissions    Attending groups: New admission - SW will monitor group attendance    Behavior: calm    Has patient been completing ADL's:  New admission - unknown at this time - SW will monitor    SI:  patient denies SI  Plan: no   If yes describe: N/A - patient denies plan  HI:  patient denies HI  If present describe: N/A  Delusions: patient denies delusions  If present describe: N/A  Hallucinations: patient denies hallucinations  If present describe: N/A    Patient rates their -- Depression: 1-10:  0  Anxiety:1-10:  0    Sleeping:New admission - unknown at this time.    Taking medication: New admission - unknown at this time.    Misc:                                                    
      Behavioral Services  Medicare Certification Upon Admission    I certify that this patient's inpatient psychiatric hospital admission is medically necessary for:    [x] (1) Treatment which could reasonably be expected to improve this patient's condition,       [x] (2) Or for diagnostic study;     AND     [x](2) The inpatient psychiatric services are provided while the individual is under the care of a physician and are included in the individualized plan of care.    Estimated length of stay/service 5-7 days    Plan for post-hospital care TBD    Electronically signed by VIOLET JACQUES MD on 1/1/2024 at 11:58 AM      
      [facility] ADULT INITIAL INTAKE ASSESSMENT     1/1/24    Deanna Millan ,a 33 y.o. female, presents to the ED for a psychiatric assessment.     ED Arrival time: 0708  ED physician: Brian MCCABE Notification time: 0008  EL[facility] Assessment start time: 0819  Psychiatrist call time: 0915  Spoke with Dr. Hamilton    Patient is referred by: Father    Reason for visit to ED - Presenting problem:     PT states reason for ED visit, \" Voices will not stop, and I want to kill myself.\"  Several voices telling her to hurt self,pointing things out around her, and others she cannot understand,but enough to drive her crazy,per, patient. History of suicide attempt two years ago by overdose.Previous diagnosis of Schizoaffective Disorder Bipolar Type, per patient. Reports past history of methamphetamine abuse, family history of substance use, and mental illness. Denies any homicidal ideations, paranoia, visual hallucinations, or self- harming thoughts. Does have history of self- mutilating behaviors. Patient with several admission to [facility], and has up-coming appointment at [facility]. Reports that she thought about overdoing, with intent, but came here to seek help before she got to that,per patient. Also tells that she is not sleeping well, and not completing her hygiene.Patient feels like her medications are not working for her. Patient with flat effect, poor eye-contact, and reports being depressed.Urine drug screen positive  for opiates and oxycodone.COVID negative.       Ed. Provider Notes:     Deanna Millan is a 33 y.o. female who presents to the emergency department for mental health evaluation.  Patient was recently admitted in December and states that admission did help her but over the past several days has had return of auditory hallucinations with voices telling her to harm herself.  Admits to suicidal thoughts denies any specific plan.  No visual hallucinations.  History of schizoaffective disorder.  Denies any 
Behavioral Health   Discharge Note  Bridge Appointment completed: Reviewed Discharge Instructions with patient.    Patient verbalizes understanding and agreement with the discharge plan using the teachback method.     Referral for Outpatient Tobacco Cessation Counseling, upon discharge (clifton X if applicable and completed):    ( )  Hospital staff assisted patient to call Quit Line or faxed referral                                   during hospitalization                  ( )  Recognizing danger situations (included triggers and roadblocks), if not completed on admission                    ( )  Coping skills (new ways to manage stress, exercise, relaxation techniques, changing routine, distraction), if not completed on admission                                                           ( )  Basic information about quitting (benefits of quitting, techniques in how to quit, available resources, if not completed on admission  ( ) [facility]   ( ) Patient refused referral  ( ) Patient refused counseling  (x) Patient refused smoking cessation medication upon discharge    Vaccinations (clifton X if applicable and completed):  ( ) Patient states already received influenza vaccine elsewhere  ( ) Patient received influenza vaccine during this hospitalization  x) Patient refused influenza vaccine at this time      Pt discharged with followings belongings:   Dental Appliances: None  Vision - Corrective Lenses: None  Hearing Aid: None  Jewelry: None  Body Piercings Removed: No  Clothing: Footwear, Pants, Socks, Undergarments, At bedside  Other Valuables: At home   Patient has no valuables this visit.   Patient left department with Departure Mode: In cab via [employer] at Departure: Ambulatory, discharged to Discharged to: Assisted living. Patient education on aftercare instructions: yes  Patient verbalize understanding of AVS:  yes.  Suicidal Ideations? No Risk of Suicide: No Risk AVH? 
CLINICAL PHARMACY NOTE: MEDS TO BEDS    Total # of Prescriptions Filled: 6   The following medications were delivered to the patient:  Discharge Medication List as of 1/3/2024  9:35 AM        START taking these medications    Details   hydrOXYzine HCl (ATARAX) 25 MG tablet Take 1 tablet by mouth 3 times daily as needed for Anxiety, Disp-42 tablet, R-0Normal         Trazodone  Vitamin D  Melatonin  Latuda   Lithium     Additional Documentation:     Delivered Rx's to 6th floor RN station.   
[facility] Adult Unit Daily Assessment  Nursing Progress Note    Room: [facility]/606-01   Name: Deanna Millan   Age: 33 y.o.   Gender: female   Dx: Depression with suicidal ideation  Precautions: suicide risk and fall risk  Inpatient Status: voluntary     SLEEP:  Sleep Quality Fair  Sleep Medications: Yes   PRN Sleep Meds: No     MEDICAL:  Other PRN Meds: Yes   Med Compliant: Yes  Accu-Chek: No  Oxygen/CPAP/BiPAP: No  CIWA/CINA: No   PAIN Assessment: none  Side Effects from medication: No    Metabolic Screening:  Lab Results   Component Value Date    LABA1C 5.0 01/02/2024     Lab Results   Component Value Date    CHOL 145 (L) 04/24/2023    CHOL 177 11/10/2020     Lab Results   Component Value Date    TRIG 102 04/24/2023    TRIG 196 (H) 11/10/2020     Lab Results   Component Value Date    HDL 32 (L) 04/24/2023    HDL 26 (L) 11/10/2020     No components found for: \"LDLCAL\"  No results found for: \"LABVLDL\"  Body mass index is 35.27 kg/m².  BP Readings from Last 2 Encounters:   01/02/24 127/85   12/18/23 117/78       Medical Bed:   Is patient in a medical bed? no   If medical bed is in use, has nursing secured room while patient is awake and out of the room? NA  Has safety checks by nursing been completed on the bed/room this shift? yes    Protective Factors:  Patient identifies protective factors with nursing staff as follows:   Identifies reasons for living: Yes   Supportive Social Network or family: Yes    Belief that suicide is immoral/high spirituality: Yes   Responsibility to family or others/living with family: Yes   Fear of death or dying due to pain and suffering: Yes   Engaged in work or school: No  If Patient is unable to identify, reason why?     Depression: 1   Anxiety: 1   SI denies suicidal ideation   Risk of Suicide: No Risk  HI Negative for homicidal ideation        AVH:no If Hallucinations are present, describe?     Appetite: good   Percent Meals:    Social: Yes   Speech: normal   Appearance: appropriately 
Discharge Note     Patient is discharging on this date. Patient denies SI, HI, and AVH at this time. Patient reports improvement in behavior and is leaving unit in overall good condition. SW and patient discussed patient's follow up appointments and importance of attending appointments as scheduled, patient voiced understanding and agreement. Patient and SW also discussed patient's safety plan and patient was able to verbally identify: warning signs, coping strategies, places and people that help make the patient feel better/distract negative thoughts, friends/family/agencies/professionals the patient can reach out to in a crisis, and something that is important to the patient/worth living for. Patient was provided the national suicide prevention hotline number (1-552.129.6306) as well as local community behavioral health ([facility]) crisis number for emergencies (1-257.811.2468).     Discharge Disposition: home -lives with family      Pt to follow up with:  [facility] on January 4 , 2024 at 1:00 PM for the intake appointment. Patient will follow up with [facility] WALLY Maldonado and SHERWIN Swanson  On January 5 , 2024   at 10:00 AM for the medication management appointment.     Referral to outpatient tobacco cessation counseling treatment:  Patient refused referral to outpatient tobacco cessation counseling    SW offered to assist patient with transportation, patient accepted transportation assistance - [employer] transportation was provided by cab  
[facility] Adult Unit Daily Assessment  Nursing Progress Note    Room: [facility]/606-01   Name: Deanna Millan   Age: 33 y.o.   Gender: female   Dx: Depression with suicidal ideation  Precautions: close watch and suicide risk  Inpatient Status: voluntary     SLEEP:  Sleep Quality Good  Sleep Medications: Yes   PRN Sleep Meds: Yes     MEDICAL:  Other PRN Meds: Yes   Med Compliant: Yes  Accu-Chek: No  Oxygen/CPAP/BiPAP: No  CIWA/CINA: No   PAIN Assessment: none  Side Effects from medication: No    Metabolic Screening:  Lab Results   Component Value Date    LABA1C 5.0 01/02/2024     Lab Results   Component Value Date    CHOL 145 (L) 04/24/2023    CHOL 177 11/10/2020     Lab Results   Component Value Date    TRIG 102 04/24/2023    TRIG 196 (H) 11/10/2020     Lab Results   Component Value Date    HDL 32 (L) 04/24/2023    HDL 26 (L) 11/10/2020     No components found for: \"LDLCAL\"  No results found for: \"LABVLDL\"  Body mass index is 35.27 kg/m².  BP Readings from Last 2 Encounters:   01/02/24 129/85   12/18/23 117/78       Medical Bed:   Is patient in a medical bed? NA   If medical bed is in use, has nursing secured room while patient is awake and out of the room? NA  Has safety checks by nursing been completed on the bed/room this shift? NA    Protective Factors:  Patient identifies protective factors with nursing staff as follows:   Identifies reasons for living: Yes   Supportive Social Network or family: Yes    Belief that suicide is immoral/high spirituality: Yes   Responsibility to family or others/living with family: Yes   Fear of death or dying due to pain and suffering: Yes   Engaged in work or school: No  If Patient is unable to identify, reason why?     Depression: 3   Anxiety: 3   SI denies suicidal ideation   Risk of Suicide: No Risk  HI Negative for homicidal ideation        AVH:no If Hallucinations are present, describe?     Appetite: no change from normal   Percent Meals: 75%   Social: Yes   Speech: normal 
OQ-30 Questionnaire Imported     
Patient approached the nurse desk complaining of knee pain. Patient rates the pain a 6 on a scale of 1-10. Administered Ibuprofen 400mg per order. Patient to alert staff if symptoms persist or worsen. Will continue to monitor.  
Patient arrived to the Behavioral Health Unit from ER via a wheelchair. Security present. Patient placed on 15 minute watch. Patient has no signs or symptoms of distress at this time.  
Patient was given OQ Analyst sheet before discharge. Patient did not fill out form.  
Progress Note  Deanna Millan  1/2/2024 8:11 PM  Subjective:   Admit Date:   1/1/2024      CC/ADMIT DX:       Interval History:   Reviewed overnight events and nursing notes.  She hs had no new medical issues.     I have reviewed all labs/diagnostics from the last 24hrs.       ROS:   I have done a 10 point ROS and all are negative, except what is mentioned in the HPI.    ADULT DIET; Regular; Safety Tray; Safety Tray (Disposables)    Medications:      nicotine  1 patch TransDERmal Daily    traZODone  50 mg Oral Nightly    melatonin  5 mg Oral Nightly    vitamin D  50,000 Units Oral Weekly    famotidine  20 mg Oral BID    levothyroxine  25 mcg Oral Daily    lurasidone  20 mg Oral Dinner    lithium  300 mg Oral TID WC             Objective:   Vitals: /85   Pulse (!) 121   Temp 97.5 °F (36.4 °C) (Temporal)   Resp 18   Ht 1.702 m (5' 7\")   Wt 102.2 kg (225 lb 3.2 oz)   LMP 12/05/2023   SpO2 97%   BMI 35.27 kg/m²  No intake or output data in the 24 hours ending 01/02/24 2011  General appearance: alert and cooperative with exam  Extremities: extremities normal, atraumatic, no cyanosis or edema  Neurologic:  No obvious focal neurologic deficits.  Skin: no rashes    Assessment and Plan:   Principal Problem:    Depression with suicidal ideation  Active Problems:    Psychosis, unspecified psychosis type (HCC)  Resolved Problems:    * No resolved hospital problems. *    Vit D Def    Plan:   Continue present medication(s)    Follow with Psych   Replace Vit D      Discharge planning:   her home     Electronically signed by Brook Mclaughlin MD on 1/2/2024 at 8:11 PM   
Progress Note  Deanna Millan  1/3/2024 10:27 PM  Subjective:   Admit Date:   1/1/2024      CC/ADMIT DX:       Interval History:   Reviewed overnight events and nursing notes.  She denies any new physical complaints.     I have reviewed all labs/diagnostics from the last 24hrs.       ROS:   I have done a 10 point ROS and all are negative, except what is mentioned in the HPI.    No diet orders on file    Medications:                 Objective:   Vitals: /79   Pulse (!) 105   Temp 97.5 °F (36.4 °C) (Temporal)   Resp 16   Ht 1.702 m (5' 7\")   Wt 102.2 kg (225 lb 3.2 oz)   LMP 12/05/2023   SpO2 98%   BMI 35.27 kg/m²  No intake or output data in the 24 hours ending 01/03/24 2227  General appearance: alert and cooperative with exam  Extremities: extremities normal, atraumatic, no cyanosis or edema  Neurologic:  No obvious focal neurologic deficits.  Skin: no rashes    Assessment and Plan:   Principal Problem:    Depression with suicidal ideation  Active Problems:    Psychosis, unspecified psychosis type (HCC)  Resolved Problems:    * No resolved hospital problems. *    Vit D Def    Plan:   Continue present medication(s)    Follow with Psych   She is medically stable. I will monitor for any changes or concerns.       Discharge planning:   her home     Electronically signed by Brook Mclaughlin MD on 1/3/2024 at 10:27 PM   
SW met with patient to complete psychosocial and lifetime CSSR-S on this date. Patients long and short-term goals discussed. Patient voiced understanding. Treatment plan sheet signed. Patient verbalized understanding of the treatment plan. Patient participated in goals and objectives of the treatment plan. Patient discussed safety plan with . SW explained treatment goals with pt. Decreasing depression and anxiety by improvement of positive coping patterns was discussed. Pt acknowledged understanding of treatment goals and signed treatment plan signature sheet.       In the last 6 months has the patient been a danger to self: Yes  In the last 6 months has the patient been a danger to others: No  Legal Guardian/POA: No    Activity Assessment:  Skills:  Talents:  Interests:    Provided patient with [facility] [employer] Online handout entitled \"Quitting Smoking.\"  Reviewed handout with patient: addressing dangers of smoking, developing coping skills, and providing basic information about quitting.       Patient received all components practical counseling of tobacco practical counseling during the hospital stay.         
SW placed call to get collateral from patients father and left a message at Bill at [phone]  
SW spoke with patient per patient request about transportation assistance. Patient is requesting a cab to [address] [place] [address] due to family not having a working car. Reports that her is Aunt is sick and able to provide her with safe transportation at this time.    
Treatment Team Note:     Target Symptoms/Reason for admission: Per nursing admission assessment - Reason for Admission: 33 year old female that presents to the emergency department with suicidal ideations, thoughts to overdose, intent to overdose, but wants help before reaching that point. Tells that she is heraing multiple voices, inability to sleep, complete hygeine, and isolating. Patient with several admission to [facility]. Past history of substance abuse. Patient reports going to [facility] for outpatient services.     Diagnoses per psych provider: Depression with suicidal ideation [F32.A, R45.851]  Psychosis, unspecified psychosis type (HCC) [F29]     Therapist met with treatment team to discuss patients treatment and discharge plans.     Patient's aftercare plan is: SW will meet with patient to gather information     Aftercare appointments made: yes     Pt lives with: SW will meet with patient to gather information     Collateral obtained from: father  Collateral obtained on:1/2/24     Attending groups: New admission - SW will monitor group attendance     Behavior: calm     Has patient been completing ADL's:  New admission - unknown at this time - SW will monitor     SI:  patient denies SI  Plan: no   If yes describe: N/A - patient denies plan  HI:  patient denies HI  If present describe: N/A  Delusions: patient denies delusions  If present describe: N/A  Hallucinations: patient denies hallucinations  If present describe: N/A     Patient rates their -- Depression: 1-10:  0  Anxiety:1-10:  0     Sleeping:New admission - unknown at this time.     Taking medication: New admission - unknown at this time.     Misc:                                                                      
appetite   Social: No   Speech: normal   Appearance: appropriately dressed and appropriately groomed    GROUP:  Group Participation: Yes  Participation Quality: Minimal    Notes: Pt alert and oriented x 4 with fair eye contact, adequate concentration and flat facial expression. Pt with depressed mood. Pt denies recent new stressors. Pt calm, cooperative and medication compliant. Pt appropriately groomed and dressed in casual clothing. Pt out of her room briefly this evening, in the day area, not social with peers. Pt denies SI/HI at this time. Pt admits to hearing 'annoying' voices. Denies being able to understand what the voices say. No s/s of acute distress noted. Will continue to monitor safety and behavior via 15 minute rounds.        Electronically signed by Saadia Linton RN on 1/1/24 at 10:46 PM CST

## 2024-04-12 ENCOUNTER — APPOINTMENT (OUTPATIENT)
Dept: GENERAL RADIOLOGY | Age: 34
End: 2024-04-12
Payer: MEDICAID

## 2024-04-12 ENCOUNTER — HOSPITAL ENCOUNTER (EMERGENCY)
Age: 34
Discharge: HOME | End: 2024-04-12
Payer: MEDICAID

## 2024-04-12 VITALS
BODY MASS INDEX: 33.67 KG/M2 | DIASTOLIC BLOOD PRESSURE: 95 MMHG | OXYGEN SATURATION: 97 % | RESPIRATION RATE: 18 BRPM | TEMPERATURE: 98.6 F | HEART RATE: 99 BPM | WEIGHT: 215 LBS | SYSTOLIC BLOOD PRESSURE: 146 MMHG

## 2024-04-12 DIAGNOSIS — S83.8X2A SPRAIN OF OTHER LIGAMENT OF LEFT KNEE, INITIAL ENCOUNTER: Primary | ICD-10-CM

## 2024-04-12 PROCEDURE — 73562 X-RAY EXAM OF KNEE 3: CPT

## 2024-04-12 PROCEDURE — 6360000002 HC RX W HCPCS: Performed by: PHYSICIAN ASSISTANT

## 2024-04-12 PROCEDURE — 99284 EMERGENCY DEPT VISIT MOD MDM: CPT

## 2024-04-12 PROCEDURE — 96372 THER/PROPH/DIAG INJ SC/IM: CPT

## 2024-04-12 RX ORDER — KETOROLAC TROMETHAMINE 30 MG/ML
30 INJECTION, SOLUTION INTRAMUSCULAR; INTRAVENOUS ONCE
Status: COMPLETED | OUTPATIENT
Start: 2024-04-12 | End: 2024-04-12

## 2024-04-12 RX ORDER — DEXAMETHASONE SODIUM PHOSPHATE 4 MG/ML
4 INJECTION, SOLUTION INTRA-ARTICULAR; INTRALESIONAL; INTRAMUSCULAR; INTRAVENOUS; SOFT TISSUE ONCE
Status: COMPLETED | OUTPATIENT
Start: 2024-04-12 | End: 2024-04-12

## 2024-04-12 RX ORDER — PREDNISONE 10 MG/1
10 TABLET ORAL DAILY
Qty: 10 TABLET | Refills: 0 | Status: SHIPPED | OUTPATIENT
Start: 2024-04-12 | End: 2024-04-22

## 2024-04-12 RX ADMIN — KETOROLAC TROMETHAMINE 30 MG: 30 INJECTION, SOLUTION INTRAMUSCULAR; INTRAVENOUS at 11:23

## 2024-04-12 RX ADMIN — DEXAMETHASONE SODIUM PHOSPHATE 4 MG: 4 INJECTION INTRA-ARTICULAR; INTRALESIONAL; INTRAMUSCULAR; INTRAVENOUS; SOFT TISSUE at 11:22

## 2024-04-12 ASSESSMENT — ENCOUNTER SYMPTOMS
EYE PAIN: 0
SHORTNESS OF BREATH: 0
NAUSEA: 0
SORE THROAT: 0
COUGH: 0
BACK PAIN: 0
APNEA: 0
EYE DISCHARGE: 0
ABDOMINAL DISTENTION: 0
ABDOMINAL PAIN: 0
PHOTOPHOBIA: 0
RHINORRHEA: 0
COLOR CHANGE: 0

## 2024-04-12 NOTE — PROGRESS NOTES
CLINICAL PHARMACY NOTE: MEDS TO BEDS    Total # of Prescriptions Filled: 1   The following medications were delivered to the patient:  Discharge Medication List as of 4/12/2024 11:51 AM        START taking these medications    Details   predniSONE (DELTASONE) 10 MG tablet Take 1 tablet by mouth daily for 10 days, Disp-10 tablet, R-0Normal               Additional Documentation:    Handed script to patients family at pharmacy counter. Zero copay

## 2024-04-12 NOTE — ED PROVIDER NOTES
program.  Efforts were made to edit the dictations but occasionallywords are mis-transcribed.)    SIXTO Monet Derek, PA  04/12/24 [id number]

## 2025-01-22 NOTE — TELEPHONE ENCOUNTER
Pt said yes it is covered on her ins md2   no arthritis/no muscle cramps/no muscle weakness/no neck pain/no back pain

## (undated) DEVICE — SUT VIC 0 CTX 27IN J364H BX/36

## (undated) DEVICE — ADHS LIQ MASTISOL 2/3ML

## (undated) DEVICE — APPL CHLORAPREP W/TINT 26ML ORNG

## (undated) DEVICE — 3M™ STERI-STRIP™ REINFORCED ADHESIVE SKIN CLOSURES, R1547, 1/2 IN X 4 IN (12 MM X 100 MM), 6 STRIPS/ENVELOPE: Brand: 3M™ STERI-STRIP™

## (undated) DEVICE — GOWN,PREVENTION PLUS,XLARGE,STERILE: Brand: MEDLINE

## (undated) DEVICE — SPNG GZ WOVN 4X4IN 12PLY 10/BX STRL

## (undated) DEVICE — 3M™ MEDIPORE™ H SOFT CLOTH SURGICAL TAPE 2868, 8 INCH X 10 YARD (20,3CM X 9,1M), 6 ROLLS/CASE: Brand: 3M™ MEDIPORE™

## (undated) DEVICE — GLV SURG SENSICARE SLT PF LF 8 STRL

## (undated) DEVICE — SUT GUT PLAIN TPR PT 2/0 27IN 53T

## (undated) DEVICE — Device

## (undated) DEVICE — SUT MNCRYL 0 CT1 36IN Y946H

## (undated) DEVICE — SUT VIC RAPD SZ 2/0 36IN CT1 VR945 BX/12